# Patient Record
Sex: FEMALE | Race: WHITE | NOT HISPANIC OR LATINO | URBAN - METROPOLITAN AREA
[De-identification: names, ages, dates, MRNs, and addresses within clinical notes are randomized per-mention and may not be internally consistent; named-entity substitution may affect disease eponyms.]

---

## 2019-04-07 ENCOUNTER — INPATIENT (INPATIENT)
Facility: HOSPITAL | Age: 74
LOS: 2 days | Discharge: HOME | DRG: 149 | End: 2019-04-10
Attending: PSYCHIATRY & NEUROLOGY | Admitting: PSYCHIATRY & NEUROLOGY
Payer: MEDICARE

## 2019-04-07 VITALS
HEIGHT: 63 IN | RESPIRATION RATE: 18 BRPM | HEART RATE: 83 BPM | WEIGHT: 149.91 LBS | OXYGEN SATURATION: 96 % | DIASTOLIC BLOOD PRESSURE: 84 MMHG | SYSTOLIC BLOOD PRESSURE: 151 MMHG | TEMPERATURE: 98 F

## 2019-04-07 DIAGNOSIS — R42 DIZZINESS AND GIDDINESS: ICD-10-CM

## 2019-04-07 LAB
ALBUMIN SERPL ELPH-MCNC: 4.1 G/DL — SIGNIFICANT CHANGE UP (ref 3.3–5)
ALP SERPL-CCNC: 77 U/L — SIGNIFICANT CHANGE UP (ref 40–120)
ALT FLD-CCNC: 8 U/L — LOW (ref 10–45)
ANION GAP SERPL CALC-SCNC: 11 MMOL/L — SIGNIFICANT CHANGE UP (ref 5–17)
AST SERPL-CCNC: 12 U/L — SIGNIFICANT CHANGE UP (ref 10–40)
BASOPHILS # BLD AUTO: 0 K/UL — SIGNIFICANT CHANGE UP (ref 0–0.2)
BASOPHILS NFR BLD AUTO: 0.6 % — SIGNIFICANT CHANGE UP (ref 0–2)
BILIRUB SERPL-MCNC: 0.3 MG/DL — SIGNIFICANT CHANGE UP (ref 0.2–1.2)
BUN SERPL-MCNC: 18 MG/DL — SIGNIFICANT CHANGE UP (ref 7–23)
CALCIUM SERPL-MCNC: 9.5 MG/DL — SIGNIFICANT CHANGE UP (ref 8.4–10.5)
CHLORIDE SERPL-SCNC: 101 MMOL/L — SIGNIFICANT CHANGE UP (ref 96–108)
CO2 SERPL-SCNC: 24 MMOL/L — SIGNIFICANT CHANGE UP (ref 22–31)
CREAT SERPL-MCNC: 0.69 MG/DL — SIGNIFICANT CHANGE UP (ref 0.5–1.3)
EOSINOPHIL # BLD AUTO: 0.1 K/UL — SIGNIFICANT CHANGE UP (ref 0–0.5)
EOSINOPHIL NFR BLD AUTO: 1.2 % — SIGNIFICANT CHANGE UP (ref 0–6)
GLUCOSE SERPL-MCNC: 129 MG/DL — HIGH (ref 70–99)
HCT VFR BLD CALC: 42.1 % — SIGNIFICANT CHANGE UP (ref 34.5–45)
HGB BLD-MCNC: 13.9 G/DL — SIGNIFICANT CHANGE UP (ref 11.5–15.5)
LYMPHOCYTES # BLD AUTO: 2.3 K/UL — SIGNIFICANT CHANGE UP (ref 1–3.3)
LYMPHOCYTES # BLD AUTO: 30.6 % — SIGNIFICANT CHANGE UP (ref 13–44)
MCHC RBC-ENTMCNC: 29.5 PG — SIGNIFICANT CHANGE UP (ref 27–34)
MCHC RBC-ENTMCNC: 33.1 GM/DL — SIGNIFICANT CHANGE UP (ref 32–36)
MCV RBC AUTO: 89.1 FL — SIGNIFICANT CHANGE UP (ref 80–100)
MONOCYTES # BLD AUTO: 0.4 K/UL — SIGNIFICANT CHANGE UP (ref 0–0.9)
MONOCYTES NFR BLD AUTO: 5.7 % — SIGNIFICANT CHANGE UP (ref 2–14)
NEUTROPHILS # BLD AUTO: 4.6 K/UL — SIGNIFICANT CHANGE UP (ref 1.8–7.4)
NEUTROPHILS NFR BLD AUTO: 62 % — SIGNIFICANT CHANGE UP (ref 43–77)
PLATELET # BLD AUTO: 259 K/UL — SIGNIFICANT CHANGE UP (ref 150–400)
POTASSIUM SERPL-MCNC: 4.3 MMOL/L — SIGNIFICANT CHANGE UP (ref 3.5–5.3)
POTASSIUM SERPL-SCNC: 4.3 MMOL/L — SIGNIFICANT CHANGE UP (ref 3.5–5.3)
PROT SERPL-MCNC: 6.9 G/DL — SIGNIFICANT CHANGE UP (ref 6–8.3)
RBC # BLD: 4.72 M/UL — SIGNIFICANT CHANGE UP (ref 3.8–5.2)
RBC # FLD: 12.8 % — SIGNIFICANT CHANGE UP (ref 10.3–14.5)
SODIUM SERPL-SCNC: 136 MMOL/L — SIGNIFICANT CHANGE UP (ref 135–145)
WBC # BLD: 7.4 K/UL — SIGNIFICANT CHANGE UP (ref 3.8–10.5)
WBC # FLD AUTO: 7.4 K/UL — SIGNIFICANT CHANGE UP (ref 3.8–10.5)

## 2019-04-07 PROCEDURE — 70450 CT HEAD/BRAIN W/O DYE: CPT | Mod: 26

## 2019-04-07 PROCEDURE — 99285 EMERGENCY DEPT VISIT HI MDM: CPT | Mod: 25

## 2019-04-07 PROCEDURE — 93010 ELECTROCARDIOGRAM REPORT: CPT | Mod: 59

## 2019-04-07 RX ORDER — DIAZEPAM 5 MG
5 TABLET ORAL ONCE
Qty: 0 | Refills: 0 | Status: DISCONTINUED | OUTPATIENT
Start: 2019-04-07 | End: 2019-04-07

## 2019-04-07 RX ORDER — MECLIZINE HCL 12.5 MG
25 TABLET ORAL ONCE
Qty: 0 | Refills: 0 | Status: COMPLETED | OUTPATIENT
Start: 2019-04-07 | End: 2019-04-07

## 2019-04-07 RX ORDER — ENOXAPARIN SODIUM 100 MG/ML
40 INJECTION SUBCUTANEOUS EVERY 24 HOURS
Qty: 0 | Refills: 0 | Status: DISCONTINUED | OUTPATIENT
Start: 2019-04-07 | End: 2019-04-10

## 2019-04-07 RX ORDER — SODIUM CHLORIDE 9 MG/ML
1000 INJECTION INTRAMUSCULAR; INTRAVENOUS; SUBCUTANEOUS ONCE
Qty: 0 | Refills: 0 | Status: COMPLETED | OUTPATIENT
Start: 2019-04-07 | End: 2019-04-07

## 2019-04-07 RX ORDER — METOCLOPRAMIDE HCL 10 MG
10 TABLET ORAL ONCE
Qty: 0 | Refills: 0 | Status: COMPLETED | OUTPATIENT
Start: 2019-04-07 | End: 2019-04-07

## 2019-04-07 RX ADMIN — Medication 5 MILLIGRAM(S): at 15:09

## 2019-04-07 RX ADMIN — Medication 25 MILLIGRAM(S): at 10:49

## 2019-04-07 RX ADMIN — Medication 10 MILLIGRAM(S): at 10:49

## 2019-04-07 RX ADMIN — SODIUM CHLORIDE 2000 MILLILITER(S): 9 INJECTION INTRAMUSCULAR; INTRAVENOUS; SUBCUTANEOUS at 10:49

## 2019-04-07 NOTE — ED ADULT NURSE REASSESSMENT NOTE - NS ED NURSE REASSESS COMMENT FT1
Assisted pt in ambulating to restroom. Reports improvement in dizziness. Waittng for bed upstairs. Neuro MD asked for pt's home medications - she reports taking OTC allegra daily.

## 2019-04-07 NOTE — ED PROVIDER NOTE - OBJECTIVE STATEMENT
74 yoF, otherwise healthy, presents to ED with sudden onset of vertigo while standing this am around 04:00. Woke up to use bathroom. Felt room spinning sensation and nausea, worse with head movement. Vomited x2 en route with EMS, NBNB. No fall or head trauma. Felt well last night. No abdominal pain. Has had vertigo but years ago. No speech or memory changes. No focal motor weakness.

## 2019-04-07 NOTE — ED PROVIDER NOTE - PHYSICAL EXAMINATION
PHYSICAL EXAM:  GENERAL: mild distress, well-groomed, well-developed  HEAD:  Atraumatic, Normocephalic  EYES: EOMI, PERRLA, conjunctiva and sclera clear  ENMT: No tonsillar erythema, exudates, or enlargement; Moist mucous membranes  NECK: Supple, No JVD  HEART: Regular rate and rhythm; No murmurs, rubs, or gallops  RESPIRATORY: CTA B/L, No W/R/R  ABDOMEN: Soft, Nontender, Nondistended; Bowel sounds present  NEURO: A&Ox3, nonfocal, moving all extremities, 5/5 stregth, PERRL, cranial nerves 2-12 intact, mild R horizontal nystagmus with R gaze when supine, normal speech/memory, no facial droop  EXTREMITIES:  2+ Peripheral Pulses, No clubbing, cyanosis, or edema  SKIN: warm, dry, normal color

## 2019-04-07 NOTE — ED PROVIDER NOTE - PROGRESS NOTE DETAILS
Endorsed to Dr Kiran Whyte MD, Facep Haverty PGY1- sx better after vailum, even with movement of head, but unable to ambulate without feeling vertiginous, neuro paged, they will see, consider admission if unsafe to go home Ruddy Resident MD: Pt signed out to me. Neuro accepting patient for admission under Dr. Randle, MR head/neck ordered.

## 2019-04-07 NOTE — ED ADULT NURSE REASSESSMENT NOTE - NS ED NURSE REASSESS COMMENT FT1
Pt denies pain. Pt states she feels good just laying down. Pt given food tray per MD order. Will continue to monitor.

## 2019-04-07 NOTE — ED ADULT NURSE NOTE - NSIMPLEMENTINTERV_GEN_ALL_ED
Implemented All Universal Safety Interventions:  [place] to call system. Call bell, personal items and telephone within reach. Instruct patient to call for assistance. Room bathroom lighting operational. Non-slip footwear when patient is off stretcher. Physically safe environment: no spills, clutter or unnecessary equipment. Stretcher in lowest position, wheels locked, appropriate side rails in place.

## 2019-04-07 NOTE — ED ADULT NURSE NOTE - OBJECTIVE STATEMENT
74YOF denies pmh presents to ED via EMS c/o dizziness, nausea, vomiting. Pt states she went to the bathroom at 4am and started feeling dizzy "room spinning," nausea, and vomited x1. Denies blood in vomit. Pt states she feels like "room is spinning." Denies CP, SOB, fever, chills, HA, back pain, burning upon urination.  Safety and comfort measures provided. Will continue to monitor.

## 2019-04-07 NOTE — ED ADULT NURSE REASSESSMENT NOTE - NS ED NURSE REASSESS COMMENT FT1
Pt states she is only dizzy when moving around. Pt to be evaluated by neuro and to be admitted. Pt aware of the plan. Will continue to monitor.

## 2019-04-07 NOTE — ED PROVIDER NOTE - ATTENDING CONTRIBUTION TO CARE
Private Physician None  74y female pmh Tobacco 1ppd, No dm,htn.hld,cancer,cva,mi/cad.travel. Pt comes to ed complains of dizziness. Onset 4am while in bed. Symptoms feel like I'm falling into a hole. Worse with turning head. Associated with nausea and vomiting, Nbnb stomach contents. No ha,earache no recent viral infections. PE WDWN female awake alert normocephalic atraumatic neck supple chest clear anterior & posterior abd soft +bs no mass guarding cv no rubs, gallops or murmurs, neuro gcs15 speech fleunt power 5/5 all extr pain light touch intact, cn2-12 intact except slight rt horizontal nystagmus.   Jose Whyte MD, Facep

## 2019-04-07 NOTE — H&P ADULT - ASSESSMENT
73yo C woman with no significant PMHx presents to the ED with complaints of vertigo.    Impression:  Central vs. Peripheral Vertigo    Plan:  - MRI Brain  - MRA H w/o, N w/.  - Symptomatic relief w/ Meclizine, Reglan, and/or Diazepam.   - IVF

## 2019-04-07 NOTE — ED PROVIDER NOTE - CLINICAL SUMMARY MEDICAL DECISION MAKING FREE TEXT BOX
Eldelry female pw complains of vertigo check ct ro central cause. treat symptomatically and reassess. If not improved consult neuro  Jose Whyte MD, Facep

## 2019-04-07 NOTE — ED ADULT NURSE REASSESSMENT NOTE - NS ED NURSE REASSESS COMMENT FT1
Report received from Gracia EDGE . Pt AAOx4, NAD, resp nonlabored, skin warm/dry, resting comfortably in bed. Neuro resident at bedside evaluating patient. Pt c/o dizziness on standing . Pt denies headache, chest pain, palpitations, SOB, abd pain, n/v/d, urinary symptoms, fevers, chills, weakness at this time. Currently admitted awaiting inpatient bed placement. Patient aware. Safety and comfort maintained.

## 2019-04-07 NOTE — H&P ADULT - NSHPPHYSICALEXAM_GEN_ALL_CORE
PHYSICAL EXAMINATION:  General: Well-developed, well nourished, in no acute distress.  Eyes: Conjunctiva and sclera clear.  Neurologic:  - Mental Status:  Alert, awake, oriented to person, place, and time; Speech is fluent with intact naming, repetition, and comprehension; Good overall fund of knowledge  - Cranial Nerves II-XII:    II:  Visual fields are full to confrontation; Pupils are equal, round, and reactive to light;    + III, IV, VI: ?Mild Right MIGUELITO? Diplopia on extreme left lateral gaze     V:  Facial sensation is intact in the V1-V3 distribution bilaterally.  VII:  Face is symmetric with normal eye closure and smile  VIII:  Hearing is intact to finger rub.  IX, X:  Uvula is midline and soft palate rises symmetrically  XI:  Head turning and shoulder shrug are intact.  XII:  Tongue protudes in the midline.  - Motor:  Strength is 5/5 throughout.  There is no pronator drift.  Normal muscle bulk and tone throughout.  - Reflexes:  2+ and symmetric at the biceps, triceps, brachioradialis, knees, and ankles.  Plantar responses flexor.  - Sensory:  Intact throughout to vibration, and joint-position, light touch, pin prick.    + Coordination: Mild bilateral dysmetria on FNF, L>R.    - Gait:  deferred

## 2019-04-07 NOTE — H&P ADULT - HISTORY OF PRESENT ILLNESS
75yo C woman with no significant PMHx presents to the ED with complaints of vertigo. Patient reports that she woke at 4am this morning when she noticed acute room spinning sensations. She states that it became acutely worse when she stood to go to the bathroom. Patient was nauseous. Vertigo persisted through the day. EMS was activated. Patient vomited twice while enroute to ED. Patient currently denies headache, blurry vision, diplopia, cp, sob, abd pain, d/c, weakness, or changes to weight or senses.   In the ED, patient received Diazepam, Reglan, Meclizine, with mild improvement of symptoms. VSS, CTH negative, labs grossly WNL.

## 2019-04-07 NOTE — H&P ADULT - NSHPLABSRESULTS_GEN_ALL_CORE
Vitals:  T(C): 36.7 (04-07-19 @ 19:43), Max: 36.9 (04-07-19 @ 18:55)  HR: 79 (04-07-19 @ 19:43) (74 - 86)  BP: 98/55 (04-07-19 @ 19:43) (98/55 - 151/84)  RR: 17 (04-07-19 @ 19:43) (17 - 18)  SpO2: 96% (04-07-19 @ 19:43) (96% - 100%)    Labs:                        13.9   7.4   )-----------( 259      ( 07 Apr 2019 10:45 )             42.1     04-07    136  |  101  |  18  ----------------------------<  129<H>  4.3   |  24  |  0.69    Ca    9.5      07 Apr 2019 10:45    TPro  6.9  /  Alb  4.1  /  TBili  0.3  /  DBili  x   /  AST  12  /  ALT  8<L>  /  AlkPhos  77  04-07    CAPILLARY BLOOD GLUCOSE      LIVER FUNCTIONS - ( 07 Apr 2019 10:45 )  Alb: 4.1 g/dL / Pro: 6.9 g/dL / ALK PHOS: 77 U/L / ALT: 8 U/L / AST: 12 U/L / GGT: x            Radiology:  < from: CT Head No Cont (04.07.19 @ 12:15) >    IMPRESSION:     Mild volume loss with microvascular disease, there is no acute hemorrhage   or midline shift. Poorly pneumatized and opacified mastoid tips. If   symptoms persist consider follow-up up head CT or MR if no   contraindications.    NATHANIEL SAVAGE M.D., RADIOLOGY RESIDENT  This document has been electronically signed.  IQRA HALLMAN M.D., ATTENDING RADIOLOGIST  This document has been electronically signed. Apr 7 2019 12:50PM    < end of copied text >

## 2019-04-08 LAB
ALBUMIN SERPL ELPH-MCNC: 3.7 G/DL — SIGNIFICANT CHANGE UP (ref 3.3–5)
ALP SERPL-CCNC: 71 U/L — SIGNIFICANT CHANGE UP (ref 40–120)
ALT FLD-CCNC: 10 U/L — SIGNIFICANT CHANGE UP (ref 10–45)
ANION GAP SERPL CALC-SCNC: 13 MMOL/L — SIGNIFICANT CHANGE UP (ref 5–17)
APPEARANCE UR: CLEAR — SIGNIFICANT CHANGE UP
AST SERPL-CCNC: 11 U/L — SIGNIFICANT CHANGE UP (ref 10–40)
BASOPHILS # BLD AUTO: 0.02 K/UL — SIGNIFICANT CHANGE UP (ref 0–0.2)
BASOPHILS NFR BLD AUTO: 0.3 % — SIGNIFICANT CHANGE UP (ref 0–2)
BILIRUB SERPL-MCNC: 0.4 MG/DL — SIGNIFICANT CHANGE UP (ref 0.2–1.2)
BILIRUB UR-MCNC: NEGATIVE — SIGNIFICANT CHANGE UP
BUN SERPL-MCNC: 16 MG/DL — SIGNIFICANT CHANGE UP (ref 7–23)
CALCIUM SERPL-MCNC: 9.1 MG/DL — SIGNIFICANT CHANGE UP (ref 8.4–10.5)
CHLORIDE SERPL-SCNC: 103 MMOL/L — SIGNIFICANT CHANGE UP (ref 96–108)
CHOLEST SERPL-MCNC: 173 MG/DL — SIGNIFICANT CHANGE UP (ref 10–199)
CO2 SERPL-SCNC: 23 MMOL/L — SIGNIFICANT CHANGE UP (ref 22–31)
COLOR SPEC: SIGNIFICANT CHANGE UP
CREAT SERPL-MCNC: 0.66 MG/DL — SIGNIFICANT CHANGE UP (ref 0.5–1.3)
DIFF PNL FLD: NEGATIVE — SIGNIFICANT CHANGE UP
EOSINOPHIL # BLD AUTO: 0.06 K/UL — SIGNIFICANT CHANGE UP (ref 0–0.5)
EOSINOPHIL NFR BLD AUTO: 0.8 % — SIGNIFICANT CHANGE UP (ref 0–6)
ESTIMATED AVERAGE GLUCOSE: 126 MG/DL — HIGH (ref 68–114)
FOLATE SERPL-MCNC: 10.9 NG/ML — SIGNIFICANT CHANGE UP
GLUCOSE SERPL-MCNC: 94 MG/DL — SIGNIFICANT CHANGE UP (ref 70–99)
GLUCOSE UR QL: NEGATIVE — SIGNIFICANT CHANGE UP
HBA1C BLD-MCNC: 6 % — HIGH (ref 4–5.6)
HBA1C BLD-MCNC: 6 % — HIGH (ref 4–5.6)
HCT VFR BLD CALC: 39.9 % — SIGNIFICANT CHANGE UP (ref 34.5–45)
HCV AB S/CO SERPL IA: 0.17 S/CO — SIGNIFICANT CHANGE UP (ref 0–0.99)
HCV AB SERPL-IMP: SIGNIFICANT CHANGE UP
HDLC SERPL-MCNC: 41 MG/DL — LOW
HGB BLD-MCNC: 12.8 G/DL — SIGNIFICANT CHANGE UP (ref 11.5–15.5)
IMM GRANULOCYTES NFR BLD AUTO: 0.3 % — SIGNIFICANT CHANGE UP (ref 0–1.5)
KETONES UR-MCNC: NEGATIVE — SIGNIFICANT CHANGE UP
LEUKOCYTE ESTERASE UR-ACNC: NEGATIVE — SIGNIFICANT CHANGE UP
LIPID PNL WITH DIRECT LDL SERPL: 101 MG/DL — HIGH
LYMPHOCYTES # BLD AUTO: 2.52 K/UL — SIGNIFICANT CHANGE UP (ref 1–3.3)
LYMPHOCYTES # BLD AUTO: 32.6 % — SIGNIFICANT CHANGE UP (ref 13–44)
MCHC RBC-ENTMCNC: 28.4 PG — SIGNIFICANT CHANGE UP (ref 27–34)
MCHC RBC-ENTMCNC: 32.1 GM/DL — SIGNIFICANT CHANGE UP (ref 32–36)
MCV RBC AUTO: 88.5 FL — SIGNIFICANT CHANGE UP (ref 80–100)
MONOCYTES # BLD AUTO: 0.72 K/UL — SIGNIFICANT CHANGE UP (ref 0–0.9)
MONOCYTES NFR BLD AUTO: 9.3 % — SIGNIFICANT CHANGE UP (ref 2–14)
NEUTROPHILS # BLD AUTO: 4.39 K/UL — SIGNIFICANT CHANGE UP (ref 1.8–7.4)
NEUTROPHILS NFR BLD AUTO: 56.7 % — SIGNIFICANT CHANGE UP (ref 43–77)
NITRITE UR-MCNC: NEGATIVE — SIGNIFICANT CHANGE UP
PH UR: 6.5 — SIGNIFICANT CHANGE UP (ref 5–8)
PLATELET # BLD AUTO: 253 K/UL — SIGNIFICANT CHANGE UP (ref 150–400)
POTASSIUM SERPL-MCNC: 4.1 MMOL/L — SIGNIFICANT CHANGE UP (ref 3.5–5.3)
POTASSIUM SERPL-SCNC: 4.1 MMOL/L — SIGNIFICANT CHANGE UP (ref 3.5–5.3)
PROT SERPL-MCNC: 6.5 G/DL — SIGNIFICANT CHANGE UP (ref 6–8.3)
PROT UR-MCNC: NEGATIVE — SIGNIFICANT CHANGE UP
RBC # BLD: 4.51 M/UL — SIGNIFICANT CHANGE UP (ref 3.8–5.2)
RBC # FLD: 13.5 % — SIGNIFICANT CHANGE UP (ref 10.3–14.5)
SODIUM SERPL-SCNC: 139 MMOL/L — SIGNIFICANT CHANGE UP (ref 135–145)
SP GR SPEC: 1.02 — SIGNIFICANT CHANGE UP (ref 1.01–1.02)
T3 SERPL-MCNC: 93 NG/DL — SIGNIFICANT CHANGE UP (ref 80–200)
T4 AB SER-ACNC: 5.7 UG/DL — SIGNIFICANT CHANGE UP (ref 4.6–12)
TOTAL CHOLESTEROL/HDL RATIO MEASUREMENT: 4.2 RATIO — SIGNIFICANT CHANGE UP (ref 3.3–7.1)
TRIGL SERPL-MCNC: 156 MG/DL — HIGH (ref 10–149)
TSH SERPL-MCNC: 2.45 UIU/ML — SIGNIFICANT CHANGE UP (ref 0.27–4.2)
UROBILINOGEN FLD QL: SIGNIFICANT CHANGE UP
VIT B12 SERPL-MCNC: 372 PG/ML — SIGNIFICANT CHANGE UP (ref 232–1245)
WBC # BLD: 7.73 K/UL — SIGNIFICANT CHANGE UP (ref 3.8–10.5)
WBC # FLD AUTO: 7.73 K/UL — SIGNIFICANT CHANGE UP (ref 3.8–10.5)

## 2019-04-08 PROCEDURE — 70544 MR ANGIOGRAPHY HEAD W/O DYE: CPT | Mod: 26,59

## 2019-04-08 PROCEDURE — 70548 MR ANGIOGRAPHY NECK W/DYE: CPT | Mod: 26

## 2019-04-08 PROCEDURE — 70553 MRI BRAIN STEM W/O & W/DYE: CPT | Mod: 26

## 2019-04-08 RX ORDER — LORATADINE 10 MG/1
10 TABLET ORAL DAILY
Qty: 0 | Refills: 0 | Status: DISCONTINUED | OUTPATIENT
Start: 2019-04-08 | End: 2019-04-10

## 2019-04-08 NOTE — ED ADULT NURSE REASSESSMENT NOTE - NS ED NURSE REASSESS COMMENT FT1
pt resting comfortably in stretcher, no complaints of pain or discomfort upon assessing pt, , awaiting breakfast meal tray. upon assisting pt to the restroom pt still c/o feeling very dizzy and like the room is spinning upon standing and with ambulation, pts gait unsteady and requiring assistance with ambulation, wheelchair now being used for bringing pt to the bathroom and then assisting pt into the bathroom with one assist. call bell remains at the bedside and within reach and pt informed on call bell use for assistance when needing to use the bathroom, understanding verbalized, red socks on pt. VS stable. neuro intact with no deficits. safety and fall precautions maintained. pending medicine neuro bed assignment.

## 2019-04-09 RX ORDER — LORATADINE 10 MG/1
10 TABLET ORAL ONCE
Qty: 0 | Refills: 0 | Status: COMPLETED | OUTPATIENT
Start: 2019-04-09 | End: 2019-04-09

## 2019-04-09 RX ORDER — ATORVASTATIN CALCIUM 80 MG/1
10 TABLET, FILM COATED ORAL AT BEDTIME
Qty: 0 | Refills: 0 | Status: DISCONTINUED | OUTPATIENT
Start: 2019-04-09 | End: 2019-04-09

## 2019-04-09 RX ADMIN — LORATADINE 10 MILLIGRAM(S): 10 TABLET ORAL at 11:37

## 2019-04-09 RX ADMIN — LORATADINE 10 MILLIGRAM(S): 10 TABLET ORAL at 02:05

## 2019-04-09 NOTE — PROGRESS NOTE ADULT - SUBJECTIVE AND OBJECTIVE BOX
Neurology Follow up note    Patient is a 74y old  Female who presents with a chief complaint of Vertigo (07 Apr 2019 21:39)      Subjective: Interval History - feels much better. still with room spinning. walking with walker, reports at baseline she walks w/o walker.    Objective:   Vital Signs Last 24 Hrs  T(C): 36.8 (09 Apr 2019 08:44), Max: 36.9 (08 Apr 2019 21:16)  T(F): 98.2 (09 Apr 2019 08:44), Max: 98.4 (08 Apr 2019 21:16)  HR: 70 (09 Apr 2019 08:44) (69 - 79)  BP: 131/75 (09 Apr 2019 08:44) (108/67 - 132/75)  BP(mean): --  RR: 18 (09 Apr 2019 08:44) (16 - 18)  SpO2: 97% (09 Apr 2019 08:44) (95% - 99%)    Neurological Exam:  Mental Status: Attention intact.  No dysarthria, aphasia or neglect.       Cranial Nerves: EOMI, VFF, no nystagmus. reports dizziness is worst on right lateral gaze. CN V1-V3 intact to light touch.  No facial asymmetry. decreased hearing on the right. Tongue midline.      Strength: 5/5 biceps, triceps b/l. 5/5 KF, KE b/l.    Gait: walking with walker without any instability    Other:    04-08    139  |  103  |  16  ----------------------------<  94  4.1   |  23  |  0.66    Ca    9.1      08 Apr 2019 05:34    TPro  6.5  /  Alb  3.7  /  TBili  0.4  /  DBili  x   /  AST  11  /  ALT  10  /  AlkPhos  71  04-08 04-08    139  |  103  |  16  ----------------------------<  94  4.1   |  23  |  0.66    Ca    9.1      08 Apr 2019 05:34    TPro  6.5  /  Alb  3.7  /  TBili  0.4  /  DBili  x   /  AST  11  /  ALT  10  /  AlkPhos  71  04-08    LIVER FUNCTIONS - ( 08 Apr 2019 05:34 )  Alb: 3.7 g/dL / Pro: 6.5 g/dL / ALK PHOS: 71 U/L / ALT: 10 U/L / AST: 11 U/L / GGT: x                                 12.8   7.73  )-----------( 253      ( 08 Apr 2019 08:56 )             39.9     Radiology    EKG:  tele:  TTE:  EEG:      MEDICATIONS  (STANDING):  enoxaparin Injectable 40 milliGRAM(s) SubCutaneous every 24 hours  loratadine 10 milliGRAM(s) Oral daily    MEDICATIONS  (PRN):

## 2019-04-09 NOTE — PROGRESS NOTE ADULT - ASSESSMENT
73yo C woman with no significant PMHx presents to the ED with complaints of vertigo.    Impression:  Central vs. Peripheral Vertigo    Plan:  - MRI Brain  - MRA H w/o, N w/.  - Symptomatic relief w/ Meclizine, Reglan, and/or Diazepam.   - IVF 73yo C woman with no significant PMHx presents to the ED with complaints of vertigo.    Impression: MRI brain negative for acute ischemia. MRA had some multifocal stenoses, but no major large vessel atherosclerosis. Etiology of symptoms is likely peripheral.    Plan:  - Symptomatic relief w/ Meclizine, Reglan, and/or Diazepam.   - PT/OT eval

## 2019-04-10 ENCOUNTER — TRANSCRIPTION ENCOUNTER (OUTPATIENT)
Age: 74
End: 2019-04-10

## 2019-04-10 VITALS — SYSTOLIC BLOOD PRESSURE: 111 MMHG | DIASTOLIC BLOOD PRESSURE: 72 MMHG | OXYGEN SATURATION: 97 % | HEART RATE: 77 BPM

## 2019-04-10 PROCEDURE — 96374 THER/PROPH/DIAG INJ IV PUSH: CPT | Mod: XU

## 2019-04-10 PROCEDURE — 84436 ASSAY OF TOTAL THYROXINE: CPT

## 2019-04-10 PROCEDURE — 82746 ASSAY OF FOLIC ACID SERUM: CPT

## 2019-04-10 PROCEDURE — 70553 MRI BRAIN STEM W/O & W/DYE: CPT

## 2019-04-10 PROCEDURE — 84480 ASSAY TRIIODOTHYRONINE (T3): CPT

## 2019-04-10 PROCEDURE — 99285 EMERGENCY DEPT VISIT HI MDM: CPT | Mod: 25

## 2019-04-10 PROCEDURE — 80061 LIPID PANEL: CPT

## 2019-04-10 PROCEDURE — 70450 CT HEAD/BRAIN W/O DYE: CPT

## 2019-04-10 PROCEDURE — 84443 ASSAY THYROID STIM HORMONE: CPT

## 2019-04-10 PROCEDURE — 70548 MR ANGIOGRAPHY NECK W/DYE: CPT

## 2019-04-10 PROCEDURE — 82607 VITAMIN B-12: CPT

## 2019-04-10 PROCEDURE — 70544 MR ANGIOGRAPHY HEAD W/O DYE: CPT

## 2019-04-10 PROCEDURE — 86803 HEPATITIS C AB TEST: CPT

## 2019-04-10 PROCEDURE — 81003 URINALYSIS AUTO W/O SCOPE: CPT

## 2019-04-10 PROCEDURE — 97165 OT EVAL LOW COMPLEX 30 MIN: CPT

## 2019-04-10 PROCEDURE — A9585: CPT

## 2019-04-10 PROCEDURE — 83036 HEMOGLOBIN GLYCOSYLATED A1C: CPT

## 2019-04-10 PROCEDURE — 85027 COMPLETE CBC AUTOMATED: CPT

## 2019-04-10 PROCEDURE — 80053 COMPREHEN METABOLIC PANEL: CPT

## 2019-04-10 PROCEDURE — 93005 ELECTROCARDIOGRAM TRACING: CPT

## 2019-04-10 RX ORDER — LORATADINE 10 MG/1
1 TABLET ORAL
Qty: 0 | Refills: 0 | DISCHARGE
Start: 2019-04-10

## 2019-04-10 NOTE — DISCHARGE NOTE PROVIDER - NSFOLLOWUPCLINICS_GEN_ALL_ED_FT
[facility] - ENT  Otolaryngology (ENT)  [address] [place] [address]  Phone: [phone]  Fax:   Follow Up Time:

## 2019-04-10 NOTE — OCCUPATIONAL THERAPY INITIAL EVALUATION ADULT - PERTINENT HX OF CURRENT PROBLEM, REHAB EVAL
75yo F presents to the ED with complaints of vertigo. Patient reports that she woke at 4am this morning when she noticed acute room spinning sensations. She states that it became acutely worse when she stood to go to the bathroom. Patient was nauseous. Vertigo persisted through the day. EMS was activated. Patient vomited twice while enroute to ED. In the ED, patient received Diazepam, Reglan, Meclizine, with mild improvement of symptoms. VSS, CTH negative, labs grossly WNL.

## 2019-04-10 NOTE — PHYSICAL THERAPY INITIAL EVALUATION ADULT - PERTINENT HX OF CURRENT PROBLEM, REHAB EVAL
73yo F presents to the ED with c/o vertigo. Pt reports that she woke at 4am this morning when she noticed acute room spinning sensations. She states that it became acutely worse when she stood to go to the bathroom. Pt was nauseous. Vertigo persisted through the day. EMS was activated. Pt vomited twice while enroute to ED.

## 2019-04-10 NOTE — DISCHARGE NOTE PROVIDER - NSDCCPCAREPLAN_GEN_ALL_CORE_FT
PRINCIPAL DISCHARGE DIAGNOSIS  Diagnosis: Vertigo  Assessment and Plan of Treatment: Please attend outpatient physical therapy and vestibular rehab. You can follow up in the neurology or ENT office after discharge from the hospital.

## 2019-04-10 NOTE — DISCHARGE NOTE NURSING/CASE MANAGEMENT/SOCIAL WORK - NSDCPEEMAIL_GEN_ALL_CORE
[facility] for Tobacco Control email [email or web address]

## 2019-04-10 NOTE — PROGRESS NOTE ADULT - ASSESSMENT
73yo C woman with no significant PMHx presents to the ED with complaints of vertigo.    Impression: MRI brain negative for acute ischemia. MRA had some multifocal stenoses, but no major large vessel atherosclerosis. Etiology of symptoms is likely peripheral.    Plan:  - Symptomatic relief w/ Meclizine, Reglan, and/or Diazepam.   - PT/OT eval  -d/c planning

## 2019-04-10 NOTE — DISCHARGE NOTE NURSING/CASE MANAGEMENT/SOCIAL WORK - NSDCDPATPORTLINK_GEN_ALL_CORE
You can access the [facility] Portal, offered by [facility], by registering with the following website: [email or web address]

## 2019-04-10 NOTE — OCCUPATIONAL THERAPY INITIAL EVALUATION ADULT - ADDITIONAL COMMENTS
MR head 4/8- Redemonstration of mild to moderate volume loss with nonspecific white matter T2 hyperintensities likely microvascular disease without restricted diffusion, hemorrhage or midline shift. Atherosclerotic vascular calcification with multifocal stenosis and irregularity of the carotid siphons with patent proximal vessels, stenosis of the distal anterior middle and posterior cerebral arteries. Slightly tortuous extracranial vessels likely reflecting hypertension, there is no hemodynamically significant stenosis at the ICA origins by NASCET criteria.

## 2019-04-10 NOTE — PHYSICAL THERAPY INITIAL EVALUATION ADULT - ADDITIONAL COMMENTS
Prior to admission pt lived alone in apartment with elevator access and was independent with all functional mobility.

## 2019-04-10 NOTE — PROGRESS NOTE ADULT - SUBJECTIVE AND OBJECTIVE BOX
Neurology Follow up note    Patient is a 74y old  Female who presents with a chief complaint of Vertigo (09 Apr 2019 11:13)      Subjective: Interval History - No events overnight    Objective:   Vital Signs Last 24 Hrs  T(C): 37.1 (10 Apr 2019 08:20), Max: 37.2 (09 Apr 2019 15:20)  T(F): 98.8 (10 Apr 2019 08:20), Max: 98.9 (09 Apr 2019 15:20)  HR: 66 (10 Apr 2019 08:20) (66 - 93)  BP: 135/78 (10 Apr 2019 08:20) (106/73 - 135/78)  BP(mean): --  RR: 18 (10 Apr 2019 08:20) (18 - 18)  SpO2: 97% (10 Apr 2019 08:20) (95% - 98%)      Neurological Exam:  Mental Status: Attention intact.  No dysarthria, aphasia or neglect.       Cranial Nerves: EOMI, VFF, no nystagmus. reports dizziness is worst on right lateral gaze. CN V1-V3 intact to light touch.  No facial asymmetry. decreased hearing on the right. Tongue midline.      Strength: 5/5 biceps, triceps b/l. 5/5 KF, KE b/l.    Gait: walking with walker without any instability    Other:                    Radiology    EKG:  tele:  TTE:  EEG:      MEDICATIONS  (STANDING):  enoxaparin Injectable 40 milliGRAM(s) SubCutaneous every 24 hours  loratadine 10 milliGRAM(s) Oral daily    MEDICATIONS  (PRN): Neurology Follow up note    Patient is a 74y old  Female who presents with a chief complaint of Vertigo (09 Apr 2019 11:13)      Subjective: Interval History - No events overnight    Objective:   Vital Signs Last 24 Hrs  T(C): 37.1 (10 Apr 2019 08:20), Max: 37.2 (09 Apr 2019 15:20)  T(F): 98.8 (10 Apr 2019 08:20), Max: 98.9 (09 Apr 2019 15:20)  HR: 66 (10 Apr 2019 08:20) (66 - 93)  BP: 135/78 (10 Apr 2019 08:20) (106/73 - 135/78)  BP(mean): --  RR: 18 (10 Apr 2019 08:20) (18 - 18)  SpO2: 97% (10 Apr 2019 08:20) (95% - 98%)      Neurological Exam:  Mental Status: Attention intact.  No dysarthria, aphasia or neglect.       Cranial Nerves: EOMI, no nystagmus.  No facial asymmetry.  Tongue midline.      Strength: 5/5 biceps, triceps b/l. 5/5 KF, KE b/l.    Gait: walking without walker without any instability    Other:                    Radiology    EKG:  tele:  TTE:  EEG:      MEDICATIONS  (STANDING):  enoxaparin Injectable 40 milliGRAM(s) SubCutaneous every 24 hours  loratadine 10 milliGRAM(s) Oral daily    MEDICATIONS  (PRN):

## 2019-04-10 NOTE — DISCHARGE NOTE NURSING/CASE MANAGEMENT/SOCIAL WORK - NSDCPEWEB_GEN_ALL_CORE
[facility] for Tobacco Control website --- [email or web address] --- [email or web address]

## 2019-04-10 NOTE — PHYSICAL THERAPY INITIAL EVALUATION ADULT - PRECAUTIONS/LIMITATIONS, REHAB EVAL
CTHead: Mild volume loss with microvascular disease, there is no acute hemorrhage or midline shift. Poorly pneumatized and opacified mastoid tips. If symptoms persist consider follow-up up head CT or MR if no contraindications.MRIHead/Neck: Redemonstration of mild to moderate volume loss with nonspecific white matter T2 hyperintensities likely microvascular disease without restricted diffusion, hemorrhage or midline shift.Atherosclerotic vascular calcification with multifocal stenosis and irregularity of the carotid siphons with patent proximal vessels,stenosis of the distal anterior middle and posterior cerebral arteries.Slightly tortuous extracranial vessels likely reflecting hypertension, there is no hemodynamically significant stenosis at the ICA origins by NASCET criteria. CTHead: Mild volume loss with microvascular disease, there is no acute hemorrhage or midline shift. Poorly pneumatized and opacified mastoid tips. If symptoms persist consider follow-up up head CT or MR if no contraindications.MRIHead/Neck: Redemonstration of mild to moderate volume loss with nonspecific white matter T2 hyperintensities likely microvascular disease without restricted diffusion, hemorrhage or midline shift.Atherosclerotic vascular calcification with multifocal stenosis and irregularity of the carotid siphons with patent proximal vessels,stenosis of the distal anterior middle and posterior cerebral arteries.Slightly tortuous extracranial vessels likely reflecting hypertension, there is no hemodynamically significant stenosis at the ICA origins by NASCET criteria./no known precautions/limitations

## 2019-04-10 NOTE — DISCHARGE NOTE PROVIDER - CARE PROVIDER_API CALL
Jordon Paz)  Otolaryngology  [address] [place] [address]  Phone: [phone]  Fax: [phone]  Follow Up Time:

## 2019-04-10 NOTE — DISCHARGE NOTE PROVIDER - HOSPITAL COURSE
75yo C woman with no significant PMHx presents to the ED with complaints of vertigo. Patient vomited twice while en route to ED. In the hospital, she was using a walker to ambulate, typically ambulates without any device assistance. MRI head was done which was negative for acute infarct. MRA head and neck showed some atherosclerosis of carotid siphons and stenoses of distal ERINN and PCA. She was evaluted by PT and recommened outpatient PT and vestibular rehab. Patinet was stabilized and discharge home. 73yo  woman with no significant PMHx presents to the ED with complaints of vertigo. Patient vomited twice while en route to ED. In the hospital, she was using a walker to ambulate, typically ambulates without any device assistance. MRI head was done which was negative for acute infarct. MRA head and neck showed some atherosclerosis of carotid siphons and stenoses of distal ERINN and PCA. She was evaluted by PT and recommened outpatient PT and vestibular rehab. Patient was stabilized and discharged home with outpatient PT and vestibular rehab.

## 2019-05-07 ENCOUNTER — APPOINTMENT (OUTPATIENT)
Dept: OTOLARYNGOLOGY | Facility: CLINIC | Age: 74
End: 2019-05-07
Payer: COMMERCIAL

## 2019-05-07 VITALS
DIASTOLIC BLOOD PRESSURE: 73 MMHG | HEART RATE: 83 BPM | WEIGHT: 150 LBS | HEIGHT: 63 IN | BODY MASS INDEX: 26.58 KG/M2 | SYSTOLIC BLOOD PRESSURE: 118 MMHG

## 2019-05-07 DIAGNOSIS — R42 DIZZINESS AND GIDDINESS: ICD-10-CM

## 2019-05-07 PROCEDURE — 99204 OFFICE O/P NEW MOD 45 MIN: CPT | Mod: 25

## 2019-05-07 PROCEDURE — 92567 TYMPANOMETRY: CPT

## 2019-05-07 PROCEDURE — 92557 COMPREHENSIVE HEARING TEST: CPT

## 2019-05-09 PROBLEM — R42 VERTIGO: Status: ACTIVE | Noted: 2019-05-09

## 2019-05-09 NOTE — REVIEW OF SYSTEMS
[Hearing Loss] : hearing loss [Dizziness] : dizziness [Vertigo] : vertigo [Itching] : itching [Negative] : Heme/Lymph [de-identified] : hives, rash

## 2019-05-09 NOTE — HISTORY OF PRESENT ILLNESS
[de-identified] : 73yo woman with episodic vertigo\par vertigo in the middle of the night\par has had it before\par once in several months\par called ambulance, juwan to hospital\par everything was spinning for hours, throwing up\par gave meds in ER; couple hrs\par had an MRI and CT and echo and all was normal\par has hearing loss as baseline, both ears\par no h/o migraines\par can get some motion sickness\par this event was a couple weeks ago\par since then no other episodes\par some balance issues\par she is getting therapy\par \par

## 2019-06-24 ENCOUNTER — APPOINTMENT (OUTPATIENT)
Dept: PHARMACY | Facility: CLINIC | Age: 74
End: 2019-06-24
Payer: SELF-PAY

## 2019-06-24 PROCEDURE — V5010 ASSESSMENT FOR HEARING AID: CPT | Mod: NC

## 2019-09-11 ENCOUNTER — APPOINTMENT (OUTPATIENT)
Dept: PHARMACY | Facility: CLINIC | Age: 74
End: 2019-09-11

## 2019-10-03 ENCOUNTER — APPOINTMENT (OUTPATIENT)
Dept: PHARMACY | Facility: CLINIC | Age: 74
End: 2019-10-03

## 2019-10-22 ENCOUNTER — APPOINTMENT (OUTPATIENT)
Dept: PHARMACY | Facility: CLINIC | Age: 74
End: 2019-10-22
Payer: SELF-PAY

## 2019-10-22 PROCEDURE — V5299A: CUSTOM | Mod: NC

## 2019-11-13 ENCOUNTER — APPOINTMENT (OUTPATIENT)
Dept: PHARMACY | Facility: CLINIC | Age: 74
End: 2019-11-13

## 2019-11-20 ENCOUNTER — APPOINTMENT (OUTPATIENT)
Dept: PHARMACY | Facility: CLINIC | Age: 74
End: 2019-11-20

## 2019-11-21 ENCOUNTER — APPOINTMENT (OUTPATIENT)
Dept: PHARMACY | Facility: CLINIC | Age: 74
End: 2019-11-21
Payer: SELF-PAY

## 2019-11-21 PROCEDURE — V5299A: CUSTOM | Mod: NC

## 2020-02-13 NOTE — DISCHARGE NOTE NURSING/CASE MANAGEMENT/SOCIAL WORK - NSDCPEPTSTRK_GEN_ALL_CORE
Stroke support groups for patients, families, and friends/Stroke warning signs and symptoms/Signs and symptoms of stroke/Prescribed medications/Need for follow up after discharge/Stroke education booklet/Risk factors for stroke/Call 911 for stroke Anemia

## 2021-03-09 ENCOUNTER — EMERGENCY (EMERGENCY)
Facility: HOSPITAL | Age: 76
LOS: 1 days | Discharge: LEFT BEFORE DISCHARGE | End: 2021-03-09
Attending: EMERGENCY MEDICINE
Payer: MEDICARE

## 2021-03-09 VITALS
OXYGEN SATURATION: 100 % | HEART RATE: 74 BPM | RESPIRATION RATE: 15 BRPM | SYSTOLIC BLOOD PRESSURE: 123 MMHG | DIASTOLIC BLOOD PRESSURE: 72 MMHG

## 2021-03-09 VITALS
HEART RATE: 93 BPM | SYSTOLIC BLOOD PRESSURE: 110 MMHG | OXYGEN SATURATION: 98 % | RESPIRATION RATE: 18 BRPM | WEIGHT: 149.91 LBS | HEIGHT: 63 IN | DIASTOLIC BLOOD PRESSURE: 67 MMHG | TEMPERATURE: 99 F

## 2021-03-09 LAB
ALBUMIN SERPL ELPH-MCNC: 4.1 G/DL — SIGNIFICANT CHANGE UP (ref 3.3–5)
ALP SERPL-CCNC: 73 U/L — SIGNIFICANT CHANGE UP (ref 40–120)
ALT FLD-CCNC: 16 U/L — SIGNIFICANT CHANGE UP (ref 10–45)
ANION GAP SERPL CALC-SCNC: 13 MMOL/L — SIGNIFICANT CHANGE UP (ref 5–17)
APTT BLD: 28.8 SEC — SIGNIFICANT CHANGE UP (ref 27.5–35.5)
AST SERPL-CCNC: 21 U/L — SIGNIFICANT CHANGE UP (ref 10–40)
BASOPHILS # BLD AUTO: 0.01 K/UL — SIGNIFICANT CHANGE UP (ref 0–0.2)
BASOPHILS NFR BLD AUTO: 0.2 % — SIGNIFICANT CHANGE UP (ref 0–2)
BILIRUB SERPL-MCNC: 0.4 MG/DL — SIGNIFICANT CHANGE UP (ref 0.2–1.2)
BUN SERPL-MCNC: 14 MG/DL — SIGNIFICANT CHANGE UP (ref 7–23)
CALCIUM SERPL-MCNC: 9.6 MG/DL — SIGNIFICANT CHANGE UP (ref 8.4–10.5)
CHLORIDE SERPL-SCNC: 97 MMOL/L — SIGNIFICANT CHANGE UP (ref 96–108)
CO2 SERPL-SCNC: 24 MMOL/L — SIGNIFICANT CHANGE UP (ref 22–31)
CREAT SERPL-MCNC: 0.73 MG/DL — SIGNIFICANT CHANGE UP (ref 0.5–1.3)
EOSINOPHIL # BLD AUTO: 0.01 K/UL — SIGNIFICANT CHANGE UP (ref 0–0.5)
EOSINOPHIL NFR BLD AUTO: 0.2 % — SIGNIFICANT CHANGE UP (ref 0–6)
GLUCOSE SERPL-MCNC: 92 MG/DL — SIGNIFICANT CHANGE UP (ref 70–99)
HCT VFR BLD CALC: 39.2 % — SIGNIFICANT CHANGE UP (ref 34.5–45)
HGB BLD-MCNC: 13.2 G/DL — SIGNIFICANT CHANGE UP (ref 11.5–15.5)
IMM GRANULOCYTES NFR BLD AUTO: 0.4 % — SIGNIFICANT CHANGE UP (ref 0–1.5)
INR BLD: 1.05 RATIO — SIGNIFICANT CHANGE UP (ref 0.88–1.16)
LYMPHOCYTES # BLD AUTO: 1.23 K/UL — SIGNIFICANT CHANGE UP (ref 1–3.3)
LYMPHOCYTES # BLD AUTO: 25.9 % — SIGNIFICANT CHANGE UP (ref 13–44)
MCHC RBC-ENTMCNC: 29.4 PG — SIGNIFICANT CHANGE UP (ref 27–34)
MCHC RBC-ENTMCNC: 33.7 GM/DL — SIGNIFICANT CHANGE UP (ref 32–36)
MCV RBC AUTO: 87.3 FL — SIGNIFICANT CHANGE UP (ref 80–100)
MONOCYTES # BLD AUTO: 0.47 K/UL — SIGNIFICANT CHANGE UP (ref 0–0.9)
MONOCYTES NFR BLD AUTO: 9.9 % — SIGNIFICANT CHANGE UP (ref 2–14)
NEUTROPHILS # BLD AUTO: 3.01 K/UL — SIGNIFICANT CHANGE UP (ref 1.8–7.4)
NEUTROPHILS NFR BLD AUTO: 63.4 % — SIGNIFICANT CHANGE UP (ref 43–77)
NRBC # BLD: 0 /100 WBCS — SIGNIFICANT CHANGE UP (ref 0–0)
PLATELET # BLD AUTO: 172 K/UL — SIGNIFICANT CHANGE UP (ref 150–400)
POTASSIUM SERPL-MCNC: 4.1 MMOL/L — SIGNIFICANT CHANGE UP (ref 3.5–5.3)
POTASSIUM SERPL-SCNC: 4.1 MMOL/L — SIGNIFICANT CHANGE UP (ref 3.5–5.3)
PROT SERPL-MCNC: 7 G/DL — SIGNIFICANT CHANGE UP (ref 6–8.3)
PROTHROM AB SERPL-ACNC: 12.6 SEC — SIGNIFICANT CHANGE UP (ref 10.6–13.6)
RBC # BLD: 4.49 M/UL — SIGNIFICANT CHANGE UP (ref 3.8–5.2)
RBC # FLD: 14 % — SIGNIFICANT CHANGE UP (ref 10.3–14.5)
SARS-COV-2 RNA SPEC QL NAA+PROBE: DETECTED
SODIUM SERPL-SCNC: 134 MMOL/L — LOW (ref 135–145)
WBC # BLD: 4.75 K/UL — SIGNIFICANT CHANGE UP (ref 3.8–10.5)
WBC # FLD AUTO: 4.75 K/UL — SIGNIFICANT CHANGE UP (ref 3.8–10.5)

## 2021-03-09 PROCEDURE — 93005 ELECTROCARDIOGRAM TRACING: CPT

## 2021-03-09 PROCEDURE — U0005: CPT

## 2021-03-09 PROCEDURE — U0003: CPT

## 2021-03-09 PROCEDURE — G1004: CPT

## 2021-03-09 PROCEDURE — 85730 THROMBOPLASTIN TIME PARTIAL: CPT

## 2021-03-09 PROCEDURE — 71250 CT THORAX DX C-: CPT

## 2021-03-09 PROCEDURE — 99285 EMERGENCY DEPT VISIT HI MDM: CPT | Mod: 25

## 2021-03-09 PROCEDURE — 85025 COMPLETE CBC W/AUTO DIFF WBC: CPT

## 2021-03-09 PROCEDURE — 99285 EMERGENCY DEPT VISIT HI MDM: CPT | Mod: CS

## 2021-03-09 PROCEDURE — 71250 CT THORAX DX C-: CPT | Mod: 26,MF

## 2021-03-09 PROCEDURE — 99283 EMERGENCY DEPT VISIT LOW MDM: CPT | Mod: CS

## 2021-03-09 PROCEDURE — 36415 COLL VENOUS BLD VENIPUNCTURE: CPT

## 2021-03-09 PROCEDURE — 80053 COMPREHEN METABOLIC PANEL: CPT

## 2021-03-09 PROCEDURE — 85610 PROTHROMBIN TIME: CPT

## 2021-03-09 RX ORDER — ACETAMINOPHEN 500 MG
975 TABLET ORAL ONCE
Refills: 0 | Status: COMPLETED | OUTPATIENT
Start: 2021-03-09 | End: 2021-03-09

## 2021-03-09 RX ADMIN — Medication 975 MILLIGRAM(S): at 13:43

## 2021-03-09 NOTE — ED PROVIDER NOTE - NSFOLLOWUPINSTRUCTIONS_ED_ALL_ED_FT
Rib Fracture    A rib fracture is a break or crack in one of the bones of the ribs. The ribs are a group of long, curved bones that wrap around your chest and attach to your spine. They protect your lungs and other organs in the chest cavity. A broken or cracked rib is often painful, but most do not cause other problems. Most rib fractures heal on their own over time. However, rib fractures can be more serious if multiple ribs are broken or if broken ribs move out of place and push against other structures.     CAUSES  A direct blow to the chest. For example, this could happen during contact sports, a car accident, or a fall against a hard object.   Repetitive movements with high force, such as pitching a baseball or having severe coughing spells.     SYMPTOMS  Pain when you breathe in or cough.   Pain when someone presses on the injured area.     DIAGNOSIS  Your caregiver will perform a physical exam. Various imaging tests may be ordered to confirm the diagnosis and to look for related injuries. These tests may include a chest X-ray, computed tomography (CT), magnetic resonance imaging (MRI), or a bone scan.    TREATMENT  Rib fractures usually heal on their own in 1–3 months. The longer healing period is often associated with a continued cough or other aggravating activities. During the healing period, pain control is very important. Medication is usually given to control pain. Hospitalization or surgery may be needed for more severe injuries, such as those in which multiple ribs are broken or the ribs have moved out of place.     HOME CARE INSTRUCTIONS  Avoid strenuous activity and any activities or movements that cause pain. Be careful during activities and avoid bumping the injured rib.   Gradually increase activity as directed by your caregiver.  Only take over-the-counter or prescription medications as directed by your caregiver. Do not take other medications without asking your caregiver first.   Apply ice to the injured area for the first 1–2 days after you have been treated or as directed by your caregiver. Applying ice helps to reduce inflammation and pain.   Put ice in a plastic bag.  Place a towel between your skin and the bag.    Leave the ice on for 15–20 minutes at a time, every 2 hours while you are awake.  Perform deep breathing as directed by your caregiver. This will help prevent pneumonia, which is a common complication of a broken rib. Your caregiver may instruct you to:  Take deep breaths several times a day.   Try to cough several times a day, holding a pillow against the injured area.   Use a device called an incentive spirometer to practice deep breathing several times a day.  Drink enough fluids to keep your urine clear or pale yellow. This will help you avoid constipation.    Do not wear a rib belt or binder. These restrict breathing, which can lead to pneumonia.      SEEK IMMEDIATE MEDICAL CARE IF:  You have a fever.    You have difficulty breathing or shortness of breath.    You develop a continual cough, or you cough up thick or bloody sputum.  You feel sick to your stomach (nausea), throw up (vomit), or have abdominal pain.    You have worsening pain not controlled with medications.      MAKE SURE YOU:  Understand these instructions.   Will watch your condition.   Will get help right away if you are not doing well or get worse.     ADDITIONAL NOTES AND INSTRUCTIONS    Please follow up with your Primary MD in 24-48 hr.  Seek immediate medical care for any new/worsening signs or symptoms. You were seen today for a rib fracture of the right 5th and 6th ribs- you were advised for admission as you have some mucous in some of the airway and this may be due to covid19 or aspiration. You should follow up closely with your primary care doctor in 1-3 days. Use the incentive spirometer as we discussed. For pain control, take tylenol/motrin according to  label instructions. You can use over the counter 4% lidoderm patchs at the area of rib pain for supportive care. We offered to prescribe you stronger medications such as tramadol but you declined. Please speak to your doctor or call back if you find you are at increased pain.     RETURN FOR ANY SHORTNESS OF BREATH, FEVERS, OR CHEST PAIN.     Rib Fracture    A rib fracture is a break or crack in one of the bones of the ribs. The ribs are a group of long, curved bones that wrap around your chest and attach to your spine. They protect your lungs and other organs in the chest cavity. A broken or cracked rib is often painful, but most do not cause other problems. Most rib fractures heal on their own over time. However, rib fractures can be more serious if multiple ribs are broken or if broken ribs move out of place and push against other structures.     CAUSES  A direct blow to the chest. For example, this could happen during contact sports, a car accident, or a fall against a hard object.   Repetitive movements with high force, such as pitching a baseball or having severe coughing spells.     SYMPTOMS  Pain when you breathe in or cough.   Pain when someone presses on the injured area.     DIAGNOSIS  Your caregiver will perform a physical exam. Various imaging tests may be ordered to confirm the diagnosis and to look for related injuries. These tests may include a chest X-ray, computed tomography (CT), magnetic resonance imaging (MRI), or a bone scan.    TREATMENT  Rib fractures usually heal on their own in 1–3 months. The longer healing period is often associated with a continued cough or other aggravating activities. During the healing period, pain control is very important. Medication is usually given to control pain. Hospitalization or surgery may be needed for more severe injuries, such as those in which multiple ribs are broken or the ribs have moved out of place.     HOME CARE INSTRUCTIONS  Avoid strenuous activity and any activities or movements that cause pain. Be careful during activities and avoid bumping the injured rib.   Gradually increase activity as directed by your caregiver.  Only take over-the-counter or prescription medications as directed by your caregiver. Do not take other medications without asking your caregiver first.   Apply ice to the injured area for the first 1–2 days after you have been treated or as directed by your caregiver. Applying ice helps to reduce inflammation and pain.   Put ice in a plastic bag.  Place a towel between your skin and the bag.    Leave the ice on for 15–20 minutes at a time, every 2 hours while you are awake.  Perform deep breathing as directed by your caregiver. This will help prevent pneumonia, which is a common complication of a broken rib. Your caregiver may instruct you to:  Take deep breaths several times a day.   Try to cough several times a day, holding a pillow against the injured area.   Use a device called an incentive spirometer to practice deep breathing several times a day.  Drink enough fluids to keep your urine clear or pale yellow. This will help you avoid constipation.    Do not wear a rib belt or binder. These restrict breathing, which can lead to pneumonia.      SEEK IMMEDIATE MEDICAL CARE IF:  You have a fever.    You have difficulty breathing or shortness of breath.    You develop a continual cough, or you cough up thick or bloody sputum.  You feel sick to your stomach (nausea), throw up (vomit), or have abdominal pain.    You have worsening pain not controlled with medications.      MAKE SURE YOU:  Understand these instructions.   Will watch your condition.   Will get help right away if you are not doing well or get worse.     ADDITIONAL NOTES AND INSTRUCTIONS    Please follow up with your Primary MD in 24-48 hr.  Seek immediate medical care for any new/worsening signs or symptoms.

## 2021-03-09 NOTE — ED ADULT NURSE REASSESSMENT NOTE - NS ED NURSE REASSESS COMMENT FT1
Pt given incentive spiromoter and educated on proper use. Pt demonstrated proper use and verbalized understanding.

## 2021-03-09 NOTE — ED ADULT NURSE REASSESSMENT NOTE - NS ED NURSE REASSESS COMMENT FT1
pt is awake and alert, resting comfortably in stretcher, speaking in full coherent sentences. Pt endorses no pain or discomfort at this time. Pt ambulated independently with steady gait. Will continue to monitor.

## 2021-03-09 NOTE — ED ADULT NURSE REASSESSMENT NOTE - NS ED NURSE REASSESS COMMENT FT1
pt is awake and alert, resting comfortably in stretcher, speaking in full coherent sentences. Pt endorses no pain or discomfort at this time. Pt awaiting Trauma consult. Will continue to monitor.

## 2021-03-09 NOTE — ED PROVIDER NOTE - PROGRESS NOTE DETAILS
Burke, PGY1: Nondisplaced right lateral fifth and sixth rib fracture; surgery consulted given new hospital protocol. Patient able to blow 1000+ on IS Busch DO: patient evaluated by trauma team- recommends admission given increased risk of deterioration in the setting of COVID19. Discussed w/ pt, pt adamant about going home- does not wish to stay for medicine consult. ISS is > 1000. Pt understands she is increased risk of pneumonia, deterioration of respiratory status without admission, and death if untreated and deterioration more rapid that anticipated. The patient has decided to leave against medical advice.  The patient is AAOx3, not intoxicated, and displays normal decision making ability. We discussed all risks, benefits, and alternatives to the progression of treatment and the potential dangers of leaving including but not limited to permanent disability, injury, and death.  The patient was instructed that they are welcome to change their decision to leave against medical advice and return to the emergency department at any time and for any reason in order to allow us to render care. Pt will be referred for [facility] follow up - also offered pt pain control as recommended by trauma- pt adamant about not taking more meds than tylenol- did not want to provide pharmacy for medication rx- pt amenable to call back and follow up with pmd if she needs medications. Pt stable for dc - on room air. Busch DO: patient evaluated by trauma team- recommends admission given increased risk of deterioration in the setting of COVID19. Discussed w/ pt, pt adamant about going home- does not wish to stay for medicine consult. ISS is > 1000. Pt understands she is increased risk of pneumonia, deterioration of respiratory status without admission, and death if untreated and deterioration more rapid than anticipated. The patient has decided to leave against medical advice.  The patient is AAOx3, not intoxicated, and displays normal decision making ability. We discussed all risks, benefits, and alternatives to the progression of treatment and the potential dangers of leaving including but not limited to permanent disability, injury, and death.  The patient was instructed that they are welcome to change their decision to leave against medical advice and return to the emergency department at any time and for any reason in order to allow us to render care. Pt will be referred for [facility] follow up - also offered pt pain control as recommended by trauma- pt adamant about not taking more meds than tylenol- did not want to provide pharmacy for medication rx- pt amenable to call back and follow up with pmd if she needs medications. Pt stable for dc - on room air.

## 2021-03-09 NOTE — ED PROVIDER NOTE - CLINICAL SUMMARY MEDICAL DECISION MAKING FREE TEXT BOX
75yo F w/ no significant pmh coming in w/ generalized body aches since Saturday. Will evaluate for rib fractures  Plan: labs + CT hest + pain control and reassess

## 2021-03-09 NOTE — ED PROVIDER NOTE - ATTENDING CONTRIBUTION TO CARE
Patient presenting complaining of L sided chest wall pains after fall out of bed, also found to be incidentally COVID positive at urgent care.  Denying shortness of breath.  On exam patient with point tenderness of L chest wall in midaxillary line in mid/lower ribs, no bruising/crepitus.  Plan for labs, CT chest to evaluate for rib fractures, pain control, re-evaluate.

## 2021-03-09 NOTE — ED PROVIDER NOTE - OBJECTIVE STATEMENT
77yo F w/ no significant pmh coming in w/ generalized body aches since Saturday. Patient initially had a negative rapid PCR on Friday and re-tested positive on Tuesday. Patient also notes falling out of bed while sleeping and hurt her left side. Patient has otherwise been in her normal state of health; denies any fever/chills, SOB, abdominal pain or diarrhea.

## 2021-03-09 NOTE — ED ADULT NURSE NOTE - OBJECTIVE STATEMENT
pt 75 yo female allie Noel MD pt hx covid positive today after feeling body aches x few days and had negative test 4 dys ago pt comfortable is a smoker 1ppd pt states fall oob yesterday and has left side chato pain which increases with inspiration skin warm dry placed on isolation precautions 77 y/o F A&Ox3 denies PMH/ PSH presents to the ED from urgent care c/o rib pain. Pt reports falling out of bed last night and landing on her left side. Pt denies hitting her head of LOC. Pt endorses generalized weakness and aches since Saturday. Pt reports going to  this morning for L rib pain. Pt tested positive for Covid @ ,  advised pt to be seen in the ED. Upon arrival to ED pt is well appearing. Breathing is even and unlabored, satting 100% RA. Skin is warm, dry & in tact. Pt denies CP, SOB, fevers, chills, HA, N/V/D. IV access obtained. Call bell wthin reach, comfort & safety provided.

## 2021-03-09 NOTE — CONSULT NOTE ADULT - ATTENDING COMMENTS
Pt seen and examined.  Chart reviewed.  Resident note confirmed.  Pt is a 76 year old female with no sigificant medical history who presents to [facility] s/p fall from bed. Of note, she tested Covid-19 positive earlier that day. Work up revealed two right rib fx. Pt is able to pull 1L on ISP. Her pain is well controlled.    PMH/PSH/MEDS/ALL/SH/FH/ROS: Unchanged from H&P  Vitals/PE/Labs/Radiographs: Reviewed    A/p  Fall with rib fx  Covid-19 positive  Start multimodal pain control  ISP to prevent pneumonia  Medicine service consultation  Pt refusing admission  Risks/benefits d/w patient  Will follow with you.

## 2021-03-09 NOTE — ED PROVIDER NOTE - NSPTACCESSSVCSAPPTDETAILS_ED_ALL_ED_FT
URGENT- please refer to [facility] program for covid19 patients- pt has 2 rib fractures and reported covdi19

## 2021-03-09 NOTE — CONSULT NOTE ADULT - ASSESSMENT
ASSESSMENT: Patient is a 76F found to have rib fx x 2 after falling out of bed    PLAN:    - Had extensive discussion w/ the pt stating that we would like to admit her for further pain control and Medicine evaluation in setting of active COVID infection and rib fracture  - Despite explanation of risks of leaving the hospital against medical advice, pt states she needs to leave the hospital and cannot stay overnight. Trauma surgery team recommended pt be seen by Medicine team for COVID management, and pt was amenable to it.   - If pt is to leave the hospital, recommend discharging the pt w/ prescriptions for Tramadol and lidocaine patch, and encourage incentive spirometer use 10 times/hr at home  - Examined and discussed w/ Trauma surgery attending on call, Dr. Angy Pelayo PGY-3  ACS team

## 2021-03-09 NOTE — ED PROVIDER NOTE - NS ED ROS FT
General: denies fever, chills  HENT: denies nasal congestion, rhinorrhea  Eyes: denies visual changes, blurred vision  CV: denies chest pain, palpitations  Resp: denies difficulty breathing, cough  Abdominal: denies nausea, vomiting, diarrhea, abdominal pain  : denies urinary pain or discharge  MSK: left sided rib pain  Neuro: denies headaches, numbness, tingling  Skin: denies rashes, bruises

## 2021-03-09 NOTE — ED PROVIDER NOTE - PATIENT PORTAL LINK FT
You can access the FollowMyHealth Patient Portal offered by [facility] by registering at the following website: [email or web address]. By joining [facility]’s FollowMyHealth portal, you will also be able to view your health information using other applications (apps) compatible with our system.

## 2021-03-09 NOTE — CONSULT NOTE ADULT - SUBJECTIVE AND OBJECTIVE BOX
TRAUMA SERVICE (Acute Care Surgery / ACS - #[id number]) - CONSULT NOTE  --------------------------------------------------------------------------------------------    TRAUMA ACTIVATION LEVEL: NONE    MECHANISM OF INJURY:      [] Blunt  	[] MVC	[x] Fall	[] Pedestrian Struck	[] Motorcycle accident      [] Penetrating  	[] Gun Shot Wound 		[] Stab Wound    GCS: 15 	E: 4	V: 5	M: 6    Patient is a 76y old  Female who presents with a chief complaint of     HPI: 76F w/ no significant PMH/PSH other than COVID+ test result from today from urgent care, presents after falling out of bed while asleep dreaming. Pt denies LOC, trauma to the head, or any other symptoms other than pain on the chest where she fell on. Pt went to urgent care due to pain, tested + for COVID, and came to ED.     In ED, vitals stable wnl, exam w/ minimal tenderness to R chest, labs unremarkable, and CT chest showing rib fx on 5th and 6th ribs on the Right. COVID PCR positive. Pt was able to pull 1000cc on incentive spirometer, and states pain is very well controlled without any pain meds. In addition, pt had chills for two days over the weekend, possibly associated w/ her COVID infection.     Primary Survey:    A - airway intact  B - bilateral breath sounds and good chest rise  C - initial BP  BP: 123/72 (03-09-21 @ 17:20) *** , HR HR: 74 (03-09-21 @ 17:20) *** , palpable pulses in all extremities  D - GCS 15 on arrival  Exposure obtained      Secondary Survey:   General: NAD  HEENT: Normocephalic, atraumatic, EOMI, PEERLA.  Neck: Soft, midline trachea.  Chest: No chest wall tenderness.   Cardiac: S1, S2, RRR  Respiratory: Bilateral breath sounds, clear and equal bilaterally  Abdomen: Soft, non-distended, non-tender, no rebound, no guarding, no masses palpated  Groin: Normal appearing  Back: no TTP, no palpable runoff/stepoff/deformity  Rectal: No amandeep blood, JESSICA with good tone    Patient denies fevers/chills, denies lightheadedness/dizziness, denies SOB/chest pain, denies nausea/vomiting, denies constipation/diarrhea.      ROS: 10-system review is otherwise negative except HPI above.      PAST MEDICAL & SURGICAL HISTORY:  No pertinent past medical history    No significant past surgical history      FAMILY HISTORY:    [] Family history not pertinent as reviewed with the patient and family    SOCIAL HISTORY:      ALLERGIES: No Known Allergies      HOME MEDICATIONS:     CURRENT MEDICATIONS  MEDICATIONS (STANDING):   MEDICATIONS (PRN):  --------------------------------------------------------------------------------------------    Vitals:   T(C): 37.2 (03-09-21 @ 13:06), Max: 37.3 (03-09-21 @ 12:19)  HR: 74 (03-09-21 @ 17:20) (74 - 93)  BP: 123/72 (03-09-21 @ 17:20) (110/67 - 123/72)  RR: 15 (03-09-21 @ 17:20) (15 - 18)  SpO2: 100% (03-09-21 @ 17:20) (98% - 100%)  CAPILLARY BLOOD GLUCOSE        CAPILLARY BLOOD GLUCOSE          Height (cm): 160 (03-09 @ 12:19)  Weight (kg): 68 (03-09 @ 12:19)  BMI (kg/m2): 26.6 (03-09 @ 12:19)  BSA (m2): 1.71 (03-09 @ 12:19)    --------------------------------------------------------------------------------------------    LABS  CBC (03-09 @ 14:20)                              13.2                           4.75    )----------------(  172        63.4  % Neutrophils, 25.9  % Lymphocytes, ANC: 3.01                                39.2      BMP (03-09 @ 14:20)             134<L>  |  97      |  14    		Ca++ --      Ca 9.6                ---------------------------------( 92    		Mg --                 4.1     |  24      |  0.73  			Ph --        LFTs (03-09 @ 14:20)      TPro 7.0 / Alb 4.1 / TBili 0.4 / DBili -- / AST 21 / ALT 16 / AlkPhos 73    Coags (03-09 @ 14:20)  aPTT 28.8 / INR 1.05 / PT 12.6          --------------------------------------------------------------------------------------------    MICROBIOLOGY      --------------------------------------------------------------------------------------------    IMAGING      -------------------------------------------------------------------------------------------  < from: CT Chest No Cont (03.09.21 @ 16:51) >    EXAM:  CT CHEST                            PROCEDURE DATE:  03/09/2021            INTERPRETATION:  CLINICAL INFORMATION: Rib fracture suspected, trauma, Covid positive.    COMPARISON: None.    CONTRAST/COMPLICATIONS:  IV Contrast: None  Oral Contrast: None  Complications: None reported at time of study completion    PROCEDURE:  CT of the Chest was performed.  Sagittal and coronal reformats were performed.    FINDINGS:    LUNGS AND AIRWAYS: Mucus/secretions are noted within several of the bronchi in the right lower lobe.  5 mm right lower lobe nodule (2:71), 3 mm left lower lobe nodule (2:85). Small cystic focus in the right lower lobe (2:61).  PLEURA: No pleural effusion. No pneumothorax is noted.  MEDIASTINUM AND VISHAL: No lymphadenopathy.  VESSELS: Aortic and coronary calcifications.  HEART: Heart size is normal. No pericardial effusion.  CHEST WALL AND LOWER NECK: Circumferential thickening of the esophagus is noted.  VISUALIZED UPPER ABDOMEN: Within normal limits.  BONES: Nondisplaced right lateral fifth and sixth rib fractures. Generative changes of the spine.    IMPRESSION:  Nondisplaced right lateral fifth and sixth rib fractures. No pneumothorax is noted.                HALI COOMBS MD; Resident Radiology  This document hasbeen electronically signed.  SARA COSTA MD; Attending Radiologist  This document has been electronically signed. Mar  9 2021  5:08PM    < end of copied text >

## 2021-03-09 NOTE — ED PROVIDER NOTE - PHYSICAL EXAMINATION
GENERAL: well appearing in no acute distress, non-toxic appearing  HEAD: normocephalic, atraumatic  HENT: airway intact, neck supple  EYES: normal conjunctiva  CARDIAC: regular rate and rhythm, normal S1S2, no appreciable murmurs, 2+ pulses in UE/LE b/l  PULM: normal breath sounds, clear to ascultation bilaterally, no rales, rhonchi, wheezing  GI: abdomen nondistended, soft, nontender, no guarding, rebound tenderness  : no CVA tenderness b/l, no suprapubic tenderness  NEURO: no focal motor or sensory deficits,  MSK: point tenderness of left chest wall in midaxillary line  SKIN: well-perfused, extremities warm, no visible rashes  PSYCH: appropriate mood and affect GENERAL: well appearing in no acute distress, non-toxic appearing  HEAD: normocephalic, atraumatic  HENT: airway intact, neck supple  EYES: normal conjunctiva  CARDIAC: regular rate and rhythm, normal S1S2, no appreciable murmurs, 2+ pulses in UE/LE b/l  PULM: normal breath sounds, clear to ascultation bilaterally, no rales, rhonchi, wheezing  GI: abdomen nondistended, soft, nontender, no guarding, rebound tenderness  NEURO: no focal motor or sensory deficits,  MSK: point tenderness of left chest wall in midaxillary line  SKIN: well-perfused, extremities warm, no visible rashes  PSYCH: appropriate mood and affect

## 2021-04-08 ENCOUNTER — APPOINTMENT (OUTPATIENT)
Dept: PHARMACY | Facility: CLINIC | Age: 76
End: 2021-04-08
Payer: SELF-PAY

## 2021-04-08 PROCEDURE — V5299A: CUSTOM | Mod: NC

## 2021-04-22 ENCOUNTER — APPOINTMENT (OUTPATIENT)
Dept: SPEECH THERAPY | Facility: CLINIC | Age: 76
End: 2021-04-22

## 2021-04-29 ENCOUNTER — APPOINTMENT (OUTPATIENT)
Dept: PHARMACY | Facility: CLINIC | Age: 76
End: 2021-04-29
Payer: SELF-PAY

## 2021-04-29 PROCEDURE — V5299A: CUSTOM | Mod: NC,RT

## 2021-05-13 ENCOUNTER — OUTPATIENT (OUTPATIENT)
Dept: OUTPATIENT SERVICES | Facility: HOSPITAL | Age: 76
LOS: 1 days | Discharge: HOME | End: 2021-05-13

## 2021-05-13 ENCOUNTER — APPOINTMENT (OUTPATIENT)
Dept: SPEECH THERAPY | Facility: CLINIC | Age: 76
End: 2021-05-13

## 2021-05-13 ENCOUNTER — APPOINTMENT (OUTPATIENT)
Dept: PHARMACY | Facility: CLINIC | Age: 76
End: 2021-05-13
Payer: SELF-PAY

## 2021-05-13 PROCEDURE — V5299A: CUSTOM | Mod: NC

## 2021-05-17 DIAGNOSIS — H90.A32 MIXED CONDUCTIVE AND SENSORINEURAL HEARING LOSS, UNILATERAL, LEFT EAR WITH RESTRICTED HEARING ON THE CONTRALATERAL SIDE: ICD-10-CM

## 2021-05-17 DIAGNOSIS — H90.A21 SENSORINEURAL HEARING LOSS, UNILATERAL, RIGHT EAR, WITH RESTRICTED HEARING ON THE CONTRALATERAL SIDE: ICD-10-CM

## 2022-07-05 ENCOUNTER — APPOINTMENT (OUTPATIENT)
Dept: OTOLARYNGOLOGY | Facility: CLINIC | Age: 77
End: 2022-07-05

## 2022-07-05 ENCOUNTER — NON-APPOINTMENT (OUTPATIENT)
Age: 77
End: 2022-07-05

## 2022-07-05 ENCOUNTER — APPOINTMENT (OUTPATIENT)
Dept: PHARMACY | Facility: CLINIC | Age: 77
End: 2022-07-05

## 2022-07-05 VITALS
WEIGHT: 143 LBS | BODY MASS INDEX: 25.34 KG/M2 | DIASTOLIC BLOOD PRESSURE: 84 MMHG | HEIGHT: 63 IN | HEART RATE: 68 BPM | SYSTOLIC BLOOD PRESSURE: 155 MMHG

## 2022-07-05 PROCEDURE — 99213 OFFICE O/P EST LOW 20 MIN: CPT

## 2022-07-05 PROCEDURE — V5266A: CUSTOM

## 2022-07-05 PROCEDURE — V5299A: CUSTOM

## 2022-07-05 PROCEDURE — 92557 COMPREHENSIVE HEARING TEST: CPT

## 2022-07-05 PROCEDURE — 92504 EAR MICROSCOPY EXAMINATION: CPT

## 2022-07-05 PROCEDURE — 92567 TYMPANOMETRY: CPT

## 2022-07-05 RX ORDER — FEXOFENADINE HYDROCHLORIDE 60 MG/1
TABLET, FILM COATED ORAL
Refills: 0 | Status: ACTIVE | COMMUNITY

## 2022-07-05 RX ORDER — AMOXICILLIN 500 MG/1
500 CAPSULE ORAL
Qty: 21 | Refills: 0 | Status: COMPLETED | COMMUNITY
Start: 2022-01-12

## 2022-07-05 RX ORDER — CHLORHEXIDINE GLUCONATE, 0.12% ORAL RINSE 1.2 MG/ML
0.12 SOLUTION DENTAL
Qty: 473 | Refills: 0 | Status: COMPLETED | COMMUNITY
Start: 2022-01-12

## 2022-07-05 RX ORDER — IBUPROFEN 600 MG/1
600 TABLET, FILM COATED ORAL
Qty: 12 | Refills: 0 | Status: COMPLETED | COMMUNITY
Start: 2022-01-12

## 2022-07-05 NOTE — HISTORY OF PRESENT ILLNESS
[de-identified] : 77 year old female here for hearing loss and tinnitus \par History of moderate to moderately severe sensorineural hearing loss bilaterally. (left worse than right)\par Reports hearing has gotten worse-Currently using bilateral hearing aid with improvement \par Reports intermittent left ear tinnitus starting about 3 months ago \par Describes tinnitus as a "train running"\par Reports intermittent dizziness-occurs with changes in position.\par Reports daily episodes of vertigo but states the episodes are brief. \par Reports recent vertigo episodes about 2 week ago- lasting about 20-30 seconds-resolved on its own\par Patient denies otalgia, otorrhea, ear infections, headaches related to hearing.

## 2022-07-05 NOTE — PHYSICAL EXAM
[Hearing Loss Right Only] : diminished [Hearing Loss Left Only] : diminished [Rinne Test Air Conduction Persists > Bone Conduction Right] : air conduction greater than bone conduction on the right [Rinne Test Air Conduction Persists > Bone Conduction Left] : air conduction greater than bone conduction on the left [Hearing Frazier Test (Tuning Fork On Forehead)] : no lateralization of tone [Midline] : trachea located in midline position [Binocular Microscopic Exam] : Binocular microscopic exam was performed [Normal] : the left mastoid was normal [Nystagmus] : ~T no ~M nystagmus was seen [Fukuda Step Test] : Fukuda Step Test was Negative [Romberg's Sign] : Romberg's sign was absent [Fistula Sign] : Fistula Sign: Negative [Past-Pointing] : Past-Pointing: Negative [Eduard-Hallmanuelke] : Drummond-Hallpike: Negative [de-identified] : retracted [de-identified] : retracted

## 2022-07-05 NOTE — DATA REVIEWED
[de-identified] : An audiogram was ordered and performed including tympanometry, pure tones and speech, for patient's complaint of hearing loss\par I have independently reviewed the patient's audiogram from today and my findings include laurie SNHL with B and C tymps

## 2022-07-28 ENCOUNTER — APPOINTMENT (OUTPATIENT)
Dept: PHARMACY | Facility: CLINIC | Age: 77
End: 2022-07-28

## 2022-07-28 PROCEDURE — V5299A: CUSTOM | Mod: NC

## 2022-08-04 ENCOUNTER — APPOINTMENT (OUTPATIENT)
Dept: PHARMACY | Facility: CLINIC | Age: 77
End: 2022-08-04

## 2022-08-04 PROCEDURE — V5299A: CUSTOM | Mod: NC

## 2022-08-30 ENCOUNTER — NON-APPOINTMENT (OUTPATIENT)
Age: 77
End: 2022-08-30

## 2022-08-30 ENCOUNTER — APPOINTMENT (OUTPATIENT)
Dept: PHARMACY | Facility: CLINIC | Age: 77
End: 2022-08-30

## 2022-08-30 PROCEDURE — V5014D: CUSTOM | Mod: RT

## 2022-08-30 PROCEDURE — V5299I: CUSTOM | Mod: RT

## 2022-09-15 ENCOUNTER — APPOINTMENT (OUTPATIENT)
Dept: PHARMACY | Facility: CLINIC | Age: 77
End: 2022-09-15

## 2022-09-15 PROCEDURE — V5299A: CUSTOM | Mod: NC

## 2022-09-29 ENCOUNTER — APPOINTMENT (OUTPATIENT)
Dept: PHARMACY | Facility: CLINIC | Age: 77
End: 2022-09-29

## 2022-09-29 PROCEDURE — V5299A: CUSTOM | Mod: NC

## 2023-01-28 ENCOUNTER — INPATIENT (INPATIENT)
Facility: HOSPITAL | Age: 78
LOS: 1 days | Discharge: HOME | DRG: 156 | End: 2023-01-30
Attending: STUDENT IN AN ORGANIZED HEALTH CARE EDUCATION/TRAINING PROGRAM | Admitting: HOSPITALIST
Payer: MEDICARE

## 2023-01-28 VITALS
SYSTOLIC BLOOD PRESSURE: 135 MMHG | HEART RATE: 96 BPM | WEIGHT: 149.91 LBS | HEIGHT: 64 IN | OXYGEN SATURATION: 98 % | RESPIRATION RATE: 20 BRPM | TEMPERATURE: 98 F | DIASTOLIC BLOOD PRESSURE: 78 MMHG

## 2023-01-28 PROCEDURE — 99053 MED SERV 10PM-8AM 24 HR FAC: CPT

## 2023-01-28 PROCEDURE — 99285 EMERGENCY DEPT VISIT HI MDM: CPT

## 2023-01-29 DIAGNOSIS — J04.30 SUPRAGLOTTITIS, UNSPECIFIED, WITHOUT OBSTRUCTION: ICD-10-CM

## 2023-01-29 DIAGNOSIS — Z71.6 TOBACCO ABUSE COUNSELING: ICD-10-CM

## 2023-01-29 DIAGNOSIS — J38.4 EDEMA OF LARYNX: ICD-10-CM

## 2023-01-29 LAB
ALBUMIN SERPL ELPH-MCNC: 4.5 G/DL — SIGNIFICANT CHANGE UP (ref 3.3–5)
ALP SERPL-CCNC: 66 U/L — SIGNIFICANT CHANGE UP (ref 40–120)
ALT FLD-CCNC: 8 U/L — LOW (ref 10–45)
ANION GAP SERPL CALC-SCNC: 12 MMOL/L — SIGNIFICANT CHANGE UP (ref 5–17)
APTT BLD: 28.7 SEC — SIGNIFICANT CHANGE UP (ref 27.5–35.5)
AST SERPL-CCNC: 13 U/L — SIGNIFICANT CHANGE UP (ref 10–40)
BASOPHILS # BLD AUTO: 0.04 K/UL — SIGNIFICANT CHANGE UP (ref 0–0.2)
BASOPHILS NFR BLD AUTO: 0.3 % — SIGNIFICANT CHANGE UP (ref 0–2)
BILIRUB SERPL-MCNC: 0.4 MG/DL — SIGNIFICANT CHANGE UP (ref 0.2–1.2)
BLD GP AB SCN SERPL QL: NEGATIVE — SIGNIFICANT CHANGE UP
BUN SERPL-MCNC: 26 MG/DL — HIGH (ref 7–23)
CALCIUM SERPL-MCNC: 10 MG/DL — SIGNIFICANT CHANGE UP (ref 8.4–10.5)
CHLORIDE SERPL-SCNC: 100 MMOL/L — SIGNIFICANT CHANGE UP (ref 96–108)
CO2 SERPL-SCNC: 24 MMOL/L — SIGNIFICANT CHANGE UP (ref 22–31)
CREAT SERPL-MCNC: 0.85 MG/DL — SIGNIFICANT CHANGE UP (ref 0.5–1.3)
EGFR: 71 ML/MIN/1.73M2 — SIGNIFICANT CHANGE UP
EOSINOPHIL # BLD AUTO: 0.09 K/UL — SIGNIFICANT CHANGE UP (ref 0–0.5)
EOSINOPHIL NFR BLD AUTO: 0.8 % — SIGNIFICANT CHANGE UP (ref 0–6)
GLUCOSE SERPL-MCNC: 109 MG/DL — HIGH (ref 70–99)
HCT VFR BLD CALC: 42.1 % — SIGNIFICANT CHANGE UP (ref 34.5–45)
HGB BLD-MCNC: 13.7 G/DL — SIGNIFICANT CHANGE UP (ref 11.5–15.5)
IMM GRANULOCYTES NFR BLD AUTO: 0.4 % — SIGNIFICANT CHANGE UP (ref 0–0.9)
INR BLD: 1 RATIO — SIGNIFICANT CHANGE UP (ref 0.88–1.16)
LYMPHOCYTES # BLD AUTO: 18.3 % — SIGNIFICANT CHANGE UP (ref 13–44)
LYMPHOCYTES # BLD AUTO: 2.18 K/UL — SIGNIFICANT CHANGE UP (ref 1–3.3)
MCHC RBC-ENTMCNC: 29.1 PG — SIGNIFICANT CHANGE UP (ref 27–34)
MCHC RBC-ENTMCNC: 32.5 GM/DL — SIGNIFICANT CHANGE UP (ref 32–36)
MCV RBC AUTO: 89.4 FL — SIGNIFICANT CHANGE UP (ref 80–100)
MONOCYTES # BLD AUTO: 1.1 K/UL — HIGH (ref 0–0.9)
MONOCYTES NFR BLD AUTO: 9.2 % — SIGNIFICANT CHANGE UP (ref 2–14)
NEUTROPHILS # BLD AUTO: 8.47 K/UL — HIGH (ref 1.8–7.4)
NEUTROPHILS NFR BLD AUTO: 71 % — SIGNIFICANT CHANGE UP (ref 43–77)
NRBC # BLD: 0 /100 WBCS — SIGNIFICANT CHANGE UP (ref 0–0)
PLATELET # BLD AUTO: 321 K/UL — SIGNIFICANT CHANGE UP (ref 150–400)
POTASSIUM SERPL-MCNC: 4.2 MMOL/L — SIGNIFICANT CHANGE UP (ref 3.5–5.3)
POTASSIUM SERPL-SCNC: 4.2 MMOL/L — SIGNIFICANT CHANGE UP (ref 3.5–5.3)
PROT SERPL-MCNC: 7.6 G/DL — SIGNIFICANT CHANGE UP (ref 6–8.3)
PROTHROM AB SERPL-ACNC: 11.5 SEC — SIGNIFICANT CHANGE UP (ref 10.5–13.4)
RAPID RVP RESULT: SIGNIFICANT CHANGE UP
RBC # BLD: 4.71 M/UL — SIGNIFICANT CHANGE UP (ref 3.8–5.2)
RBC # FLD: 13.9 % — SIGNIFICANT CHANGE UP (ref 10.3–14.5)
RH IG SCN BLD-IMP: POSITIVE — SIGNIFICANT CHANGE UP
RH IG SCN BLD-IMP: POSITIVE — SIGNIFICANT CHANGE UP
SARS-COV-2 RNA SPEC QL NAA+PROBE: SIGNIFICANT CHANGE UP
SODIUM SERPL-SCNC: 136 MMOL/L — SIGNIFICANT CHANGE UP (ref 135–145)
WBC # BLD: 11.93 K/UL — HIGH (ref 3.8–10.5)
WBC # FLD AUTO: 11.93 K/UL — HIGH (ref 3.8–10.5)

## 2023-01-29 PROCEDURE — 71045 X-RAY EXAM CHEST 1 VIEW: CPT | Mod: 26

## 2023-01-29 PROCEDURE — 70491 CT SOFT TISSUE NECK W/DYE: CPT | Mod: 26,MA

## 2023-01-29 PROCEDURE — 99223 1ST HOSP IP/OBS HIGH 75: CPT

## 2023-01-29 RX ORDER — ONDANSETRON 8 MG/1
4 TABLET, FILM COATED ORAL EVERY 8 HOURS
Refills: 0 | Status: DISCONTINUED | OUTPATIENT
Start: 2023-01-29 | End: 2023-01-30

## 2023-01-29 RX ORDER — CEFTRIAXONE 500 MG/1
1 INJECTION, POWDER, FOR SOLUTION INTRAMUSCULAR; INTRAVENOUS EVERY 24 HOURS
Refills: 0 | Status: DISCONTINUED | OUTPATIENT
Start: 2023-01-30 | End: 2023-01-30

## 2023-01-29 RX ORDER — FAMOTIDINE 10 MG/ML
20 INJECTION INTRAVENOUS ONCE
Refills: 0 | Status: COMPLETED | OUTPATIENT
Start: 2023-01-29 | End: 2023-01-29

## 2023-01-29 RX ORDER — DEXAMETHASONE 0.5 MG/5ML
6 ELIXIR ORAL ONCE
Refills: 0 | Status: COMPLETED | OUTPATIENT
Start: 2023-01-29 | End: 2023-01-29

## 2023-01-29 RX ORDER — CEFTRIAXONE 500 MG/1
1000 INJECTION, POWDER, FOR SOLUTION INTRAMUSCULAR; INTRAVENOUS ONCE
Refills: 0 | Status: COMPLETED | OUTPATIENT
Start: 2023-01-29 | End: 2023-01-29

## 2023-01-29 RX ORDER — LANOLIN ALCOHOL/MO/W.PET/CERES
3 CREAM (GRAM) TOPICAL AT BEDTIME
Refills: 0 | Status: DISCONTINUED | OUTPATIENT
Start: 2023-01-29 | End: 2023-01-30

## 2023-01-29 RX ORDER — DEXAMETHASONE 0.5 MG/5ML
8 ELIXIR ORAL EVERY 8 HOURS
Refills: 0 | Status: DISCONTINUED | OUTPATIENT
Start: 2023-01-29 | End: 2023-01-29

## 2023-01-29 RX ORDER — ACETAMINOPHEN 500 MG
650 TABLET ORAL EVERY 6 HOURS
Refills: 0 | Status: DISCONTINUED | OUTPATIENT
Start: 2023-01-29 | End: 2023-01-30

## 2023-01-29 RX ORDER — DEXAMETHASONE 0.5 MG/5ML
8 ELIXIR ORAL EVERY 8 HOURS
Refills: 0 | Status: DISCONTINUED | OUTPATIENT
Start: 2023-01-29 | End: 2023-01-30

## 2023-01-29 RX ORDER — DEXAMETHASONE 0.5 MG/5ML
2 ELIXIR ORAL ONCE
Refills: 0 | Status: COMPLETED | OUTPATIENT
Start: 2023-01-29 | End: 2023-01-29

## 2023-01-29 RX ORDER — NICOTINE POLACRILEX 2 MG
1 GUM BUCCAL DAILY
Refills: 0 | Status: DISCONTINUED | OUTPATIENT
Start: 2023-01-29 | End: 2023-01-30

## 2023-01-29 RX ORDER — VANCOMYCIN HCL 1 G
1000 VIAL (EA) INTRAVENOUS ONCE
Refills: 0 | Status: COMPLETED | OUTPATIENT
Start: 2023-01-29 | End: 2023-01-29

## 2023-01-29 RX ORDER — DIPHENHYDRAMINE HCL 50 MG
25 CAPSULE ORAL ONCE
Refills: 0 | Status: COMPLETED | OUTPATIENT
Start: 2023-01-29 | End: 2023-01-29

## 2023-01-29 RX ADMIN — Medication 2 MILLIGRAM(S): at 04:09

## 2023-01-29 RX ADMIN — Medication 25 MILLIGRAM(S): at 00:51

## 2023-01-29 RX ADMIN — Medication 6 MILLIGRAM(S): at 00:49

## 2023-01-29 RX ADMIN — Medication 101.6 MILLIGRAM(S): at 23:34

## 2023-01-29 RX ADMIN — CEFTRIAXONE 100 MILLIGRAM(S): 500 INJECTION, POWDER, FOR SOLUTION INTRAMUSCULAR; INTRAVENOUS at 06:46

## 2023-01-29 RX ADMIN — Medication 101.6 MILLIGRAM(S): at 13:28

## 2023-01-29 RX ADMIN — Medication 250 MILLIGRAM(S): at 07:31

## 2023-01-29 RX ADMIN — Medication 1 PATCH: at 13:28

## 2023-01-29 RX ADMIN — FAMOTIDINE 20 MILLIGRAM(S): 10 INJECTION INTRAVENOUS at 00:46

## 2023-01-29 RX ADMIN — Medication 1 PATCH: at 19:00

## 2023-01-29 NOTE — H&P ADULT - ASSESSMENT
77F with no PMHx presents to the ED admitted for supraglottitis, with no airway compromise. Reports voice change.

## 2023-01-29 NOTE — PATIENT PROFILE ADULT - FALL HARM RISK - HARM RISK INTERVENTIONS

## 2023-01-29 NOTE — ED PROVIDER NOTE - ATTENDING CONTRIBUTION TO CARE
Attending Statement (DAJA Harmon MD):    HPI: 78y/o F with no reported medical comorbidities c/o sore throat, pain with swallowing and stating she needs to spit up phlegm frequently over past 1 day. No fever. +cough. No SOB. No chest pain. Denies trauma to face/neck. Patient also reports voice feels different. +smoker (1PPD). denies h/o throat surgery or cancer.    Review of Systems:  -General: no fever   -ENT: see hpi  -Pulmonary: +cough, no shortness of breath  -Cardiac: no chest pain  -Gastrointestinal: no abdominal pain, no nausea, no vomiting, and no diarrhea.  -Skin: no rashes  -Endocrine: No h/o diabetes    On Physical Exam:  General: awake/alert, speaking clearly in full sentences but with slightly muffled voice; cooperative with exam  HEENT: PERRL, MMM; posterior pharynx clear, uvula midline; tonsils midline w/o deviation; tongue not elevated, no swelling/edema to floor of mouth  Neck: no neck tenderness, no nuchal rigidity  Cardiac: normal s1, s2; RRR; no MGR  Lungs: CTABL  Abdomen: soft nontender/nondistended  : no bladder tenderness or distension  Skin: intact, no rash  Extremities: no peripheral edema, no gross deformities  Neuro: no gross neurologic deficits    MDM: 78y/o F with no reported medical comorbidities c/o sore throat, pain with swallowing and stating she needs to spit up phlegm frequently over past 1 day. Slightly muffled voice but no gross stridor and no drooling; seems to be protecting airway, but concern for possible RPA, epiglottitis or mass; will obtain screening labs: cbc (to evaluate for leukocytosis or anemia), CMP (to evaluate for electrolyte abnormalities or renal/liver dysfunction) and then CT soft tissue neck w/ iv contrast to further evaluate. Disposition pending review of labs/imaging. trial decadron 6mg ivp.    ED Course: Patient had some improvement after decadron; patient resting comfortably, speaking clearly, no drooling, still slightly muffled voice but NAD; ct results discussed with patient, ENT consulted and to see patient shortly.

## 2023-01-29 NOTE — H&P ADULT - NSHPLABSRESULTS_GEN_ALL_CORE
I have personally reviewed the imaging: CXR with no consolidation or pleural effusion   I have personally reviewed the ECG: NSR with no acute ST-T changes, Qtc 445

## 2023-01-29 NOTE — ED PROVIDER NOTE - OBJECTIVE STATEMENT
77-year-old female no significant past medical history presenting to the emergency department with sore throat sensation with difficulty swallowing secretions and voice change that began today around 2 PM and has been worsening.  Patient has no known allergies to medications or food.  Patient was not eating any food when symptoms began.  No new medications.  Has not had similar symptoms in the past.  Patient is a daily smoker.  Also endorsing cough x1 week.  Denies fever chest pain, shortness of breath, abdominal pain, nausea, vomiting, dysuria. Sx worse when lying flat.

## 2023-01-29 NOTE — ED ADULT NURSE NOTE - OBJECTIVE STATEMENT
78yo F denies pmh presents to ED ambulatory from home with neighbor present at bedside with c/o "scratchy throat" and cough x1 day. pt reports symptoms began with slight scratchy throat at approx 2pm yesterday then continued to worsen and began having associated prod. cough around 5pm, now experiencing some SOB as well. pt also endorses difficulty swallowing and states she is "choking on saliva". pt denies any known allergies to food or medications, reports she has a history of seeing an allergist d/t occasional breakouts of itchiness and hives but was never found to have allergies. pt also reports she is a daily cigarette smoker, 1 ppd. pt denies any associated chest pain, abd pain, fevers, n/v/d. on exam in ED pt is a&ox4, awake and alert, breathing spontaneous and even with some inspiratory stridor, airway patent with mild throat swelling, speech is clear and coherent but muffled, abd soft nt nd, ambulatory independently with no difficulty. Vital signs stable, sating 98% on RA, 17 breaths per minute. pt seen and eval by ED MD. pt sat upright in bed, placed on CCM and . IV placed to LAC 20g, labs drawn and sent. pending CT scan.

## 2023-01-29 NOTE — H&P ADULT - PROBLEM SELECTOR PLAN 1
Elderly female in NAD, non-septic appearing. Afebrile. Satting in RA, able to speak in full sentences with voice changes. Mild leukocytosis. CT neck soft tissue: supraglottitis w/o airway obstruction, no discrete drainable area.     -S/p Decadron 8mg in the ED   -s/p Ceftriaxone and Vancomycin in ED   -Will continue Decadron 8mg Q8 as per ENT, can send patient on oral steroids if clinically improving   -C/w Ceftriaxone 1g QD, can de-escalate to Augmentin on discharge if BClx are negative   -Will hold off on Vancomycin, f/u with MRSA swab. Patient non-septic appearing with no airway compromise.   -Monitor for respiratory distress, drooling, tongue/face swelling   -F/u with BClx, fever curve  -Clear liquid diet   -F/u with S/S to determine if patient can tolerate PO food, however patient was seen eating a panini in the ED without supervision   -ENT following, f/u outpatient in 1-2 weeks with Dr. Sigala

## 2023-01-29 NOTE — ED PROVIDER NOTE - NS ED ROS FT
Gen: Denies fevers  CV: Denies chest pain  HEENT: +difficulty swallowing  Skin: denies color changes  Resp: +cough  Endo: Denies increased urination  GI: Denies nausea, vomiting  Msk: Denies extremity pain  : Denies dysuria  Neuro: Denies LOC  Psych: Denies mood changes  all other ROS negative unless indicated in HPI

## 2023-01-29 NOTE — H&P ADULT - HISTORY OF PRESENT ILLNESS
77F with no PMHx presents to the ED with sore throat sensation with difficulty swallowing secretions and voice change that began yesterday around 2 PM and has been worsening. Pt also admits to cough attacks and inability to tolerate po.  Patient has no known allergies to medications or food.  Patient was not eating any food when symptoms began.  No new medications.  Has not had similar symptoms in the past. Pt denies dyspnea, chest pain fevers, chills, drooling, swelling of tongue/face, HA, dizziness, syncope, chest palpitations, abd pain, n/v/d, urinary or bowel changes, active sites of bleeding or any other symptoms at this time. States she currently smokes 3 cigarettes/day and has been smoking for 50+ years. 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 Lives alone, can carry out ADLS independently. No recent hospitalization.

## 2023-01-29 NOTE — ED PROVIDER NOTE - PROGRESS NOTE DETAILS
supraglotitis seen on CT scan. ENT notified, they will see patient. Requesting an additional 2mg of decadron. Attending note (Faustino): patient reassessed, some improvement after decadron; patient resting comfortably, speaking clearly, no drooling, still slightly muffled voice but NAD; ct results discussed with patient, ENT consulted and to see patient shortly. Farhan Zepeda MD, PGY1  ENT scoped patient. Recommending CDU v admission for continued steroids and observation of airway. No obvious source of supraglottitis. Will give IV ceftriaxone and vancomycin. Discussed with hospitalist who accepted patient. Discussed with patient who is in agreement with plan. Answered all questions.

## 2023-01-29 NOTE — CONSULT NOTE ADULT - SUBJECTIVE AND OBJECTIVE BOX
CC: noisy breathy, dysphagia     HPI:   77-year-old female no significant past medical history presenting to the emergency department with sore throat sensation with difficulty swallowing secretions and voice change that began today around 2 PM and has been worsening. Pt also admits to cough attacks and inability to tolerate po.  Patient has no known allergies to medications or food.  Patient was not eating any food when symptoms began.  No new medications.  Has not had similar symptoms in the past.  Patient is a daily smoker.  Also endorsing cough x1 week.  Denies fever chest pain, shortness of breath, abdominal pain, nausea, vomiting, dysuria. Sx worse when lying flat.    PAST MEDICAL & SURGICAL HISTORY:  No pertinent past medical history      No significant past surgical history        Allergies    No Known Allergies    Intolerances      MEDICATIONS  (STANDING):    MEDICATIONS  (PRN):      Social History: +tobacco use     Family history: Pt denies any sign FHx    ROS:   ENT: all negative except as noted in HPI   CV: denies palpitations  Pulm: denies SOB, cough, hemoptysis  GI: denies change in apetite, indigestion, n/v  : denies pertinent urinary symptoms, urgency  Neuro: denies numbness/tingling, loss of sensation  Psych: denies anxiety  MS: denies muscle weakness, instability  Heme: denies easy bruising or bleeding  Endo: denies heat/cold intolerance, excessive sweating  Vascular: denies LE edema    Vital Signs Last 24 Hrs  T(C): 36.9 (29 Jan 2023 04:12), Max: 36.9 (29 Jan 2023 04:12)  T(F): 98.4 (29 Jan 2023 04:12), Max: 98.4 (29 Jan 2023 04:12)  HR: 87 (29 Jan 2023 04:12) (87 - 96)  BP: 138/93 (29 Jan 2023 04:12) (135/78 - 143/82)  BP(mean): 105 (29 Jan 2023 04:12) (105 - 105)  RR: 18 (29 Jan 2023 04:12) (17 - 20)  SpO2: 100% (29 Jan 2023 04:12) (98% - 100%)    Parameters below as of 29 Jan 2023 04:12  Patient On (Oxygen Delivery Method): room air                              13.7   11.93 )-----------( 321      ( 29 Jan 2023 01:00 )             42.1    01-29    136  |  100  |  26<H>  ----------------------------<  109<H>  4.2   |  24  |  0.85    Ca    10.0      29 Jan 2023 01:00    TPro  7.6  /  Alb  4.5  /  TBili  0.4  /  DBili  x   /  AST  13  /  ALT  8<L>  /  AlkPhos  66  01-29   PT/INR - ( 29 Jan 2023 01:00 )   PT: 11.5 sec;   INR: 1.00 ratio         PTT - ( 29 Jan 2023 01:00 )  PTT:28.7 sec    PHYSICAL EXAM:  Gen: NAD  Skin: No rashes, bruises, or lesions  Head: Normocephalic, Atraumatic  Face: no edema, erythema, or fluctuance. Parotid glands soft without mass  Eyes: no scleral injection  Nose: Nares bilaterally patent, no discharge  Mouth: No Stridor / Drooling / Trismus.  Mucosa moist, tongue/uvula midline, oropharynx clear  Neck: Flat, supple, no lymphadenopathy, trachea midline, no masses  Lymphatic: No lymphadenopathy  Resp: breathing easily, no stridor  CV: no peripheral edema/cyanosis  GI: nondistended   Peripheral vascular: no JVD or edema  Neuro: facial nerve intact, no facial droop        Fiberoptic Indirect laryngoscopy:  (Scope #2 used)Reason for Laryngoscopy:    Patient was unable to cooperate with mirror.  Nasopharynx, oropharynx, and hypopharynx clear, no bleeding. Tongue base, posterior pharyngeal wall, vallecula, epiglottis, and subglottis appear normal. No erythema, masses or lesions. Airway patent, no foreign body visualized. No glottic edema, however minimal posterior arytenoid and AE fold edema noted with minimal pooling of secretions. True vocal cords, vestibular folds, ventricles, pyriform sinuses appear normal bilaterally. Vocal cords mobile with good contact b/l, however minimal abduction noted.               IMAGING/ADDITIONAL STUDIES: < from: CT Neck Soft Tissue w/ IV Cont (01.29.23 @ 01:51) >    IMPRESSION:    Supraglottitis. No upper airway obstruction. No discrete focal drainable   collection.  Prominent lingual tonsils.  Nonspecific thickening of the proximal esophagus is partially visualized.    Findings and recommendations were discussed with Dr. Harmon of the ED at   2:35 AM on 1/29/2023    --- End of Report ---    < end of copied text >

## 2023-01-29 NOTE — PATIENT PROFILE ADULT - SAFE PLACE TO LIVE
[facility] ambulatory encounter  ORTHOPEDIC OFFICE VISIT    CHIEF COMPLAINT:  Shoulder (R shoulder injury)      SUBJECTIVE:  Calvin Kim is a 46 year old male who presented requesting evaluation for a right shoulder injury.  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  He was diagnosed with AC separation. Pain over collarbone. Denies pain elsewhere. He is in a shoulder immobilizer. Pain with activity relived with rest.     PROBLEM LIST:  Patient Active Problem List   Diagnosis   • Lateral epicondylitis of right elbow   • Carpal tunnel syndrome of right wrist       HISTORIES:  I have reviewed the past medical history, family history, social history, medications and allergies listed in the medical record as obtained by my nursing staff and support staff and agree with their documentation.    Review of systems:  12 point obtained    OBJECTIVE:  Physical ExamINATION:  Vitals:   Visit Vitals  Pulse 68   Ht 6' 3\" (1.905 m)   Wt 90.6 kg   BMI 24.97 kg/m²     Constitutional:  Well-developed, well-nourished male in no acute distress.  Skin: Warm, dry, intact without rash or lesion.  Neurologic:  Alert and oriented x3.  Musculoskeletal:  Deformity to R AC joint. Exam deferred due to pain. R hand is NVI.    LAB RESULTS:  No laboratory results for this encounter were reviewed.    IMAGING STUDIES:  Images taken in the clinic today of the right shoulder were reviewed. These demonstrate the right collarbone is elevated.  This is a type 5 AC separation.    ASSESSMENT:  1. Injury of right shoulder, initial encounter        PLAN:  Surgical options discussed with the patient.    Return in 2 weeks for a follow up visit to see if he is feeling better. If not, will consider surgery.   Physical Therapy ordered.      Orders Placed This Encounter   • XR Shoulder 2 View Bilateral   • XR Shoulder 2 View Right       No follow-ups on file.    Instructions provided as documented in the after visit  summary.    The patient indicated understanding of the diagnosis and agreed with the plan of care.    IJoellen, documented this patient chart on 08/28/19  as the medical scribe on behalf of Dr. Neri.    Agree with above     no

## 2023-01-29 NOTE — ED PROVIDER NOTE - CLINICAL SUMMARY MEDICAL DECISION MAKING FREE TEXT BOX
77-year-old female no significant past medical history presenting to the emergency department with sore throat sensation with difficulty swallowing secretions and voice change that began today around 2 PM and has been worsening.   No known allergies.  vital signs stable, saturating 100%. Has audible inspiratory stridor with muffled voice. Lungs clear. oropharynx patent. No uvular/tongue swelling. No rash. No GI pain. Not concerning for anaphylaxis at this time.   Concern for foreign body v mass given chronic daily smoking history v pharyngitis v PTA/epiglotitis.   Plan: labs, EKG, CT neck, xray chest, will give dexamethasone, benadryl, famotidine.  Will reassess.

## 2023-01-29 NOTE — CONSULT NOTE ADULT - ASSESSMENT
77y w inspiratory stridor found to have supraglottic edema on CT. indirect laryngoscopy reveals minimal posterior arytenoid and AE fold edema as well as minimal VC abduction. Pt stable breathing and satting well. Pt states she is able to tolerate small amounts of po liquids

## 2023-01-29 NOTE — ED PROVIDER NOTE - PHYSICAL EXAMINATION
Gen: Muffled voice, sitting in upright position, audible inspiratory stridor.   HEENT: EOMI, no nasal discharge, mucous membranes moist. oropharynx patent. No tongue or uvular swelling. Mild pharyngeal erythema.   CV: RRR, +S1/S2, no M/R/G, 2+ radial pulses b/l  Resp: CTAB, no W/R/R  GI: Abdomen soft non-distended, NTTP  MSK: No open wounds, no bruising, no LE edema  Skin: no rash  Neuro: A&Ox4, following commands, moving all four extremities spontaneously  Psych: appropriate mood

## 2023-01-29 NOTE — H&P ADULT - NSHPPHYSICALEXAM_GEN_ALL_CORE
Vital Signs Last 24 Hrs  T(C): 36.8 (29 Jan 2023 10:00), Max: 36.9 (29 Jan 2023 04:12)  T(F): 98.2 (29 Jan 2023 10:00), Max: 98.5 (29 Jan 2023 07:34)  HR: 105 (29 Jan 2023 10:00) (87 - 105)  BP: 114/73 (29 Jan 2023 10:00) (114/73 - 143/82)  BP(mean): 105 (29 Jan 2023 04:12) (105 - 105)  RR: 20 (29 Jan 2023 10:00) (17 - 20)  SpO2: 97% (29 Jan 2023 10:00) (97% - 100%)    Parameters below as of 29 Jan 2023 07:34  Patient On (Oxygen Delivery Method): room air        CONSTITUTIONAL: Well-groomed, in no apparent distress  EYES: No conjunctival or scleral injection, non-icteric; PERRLA and symmetric  ENMT: oral mucosa with moist membranes  NECK: Trachea midline without palpable neck mass  RESPIRATORY: Breathing comfortably; no dullness to percussion; lungs CTA without wheeze/rhonchi/rales  CARDIOVASCULAR: +S1S2, RRR, no M/G/R; no carotid bruits; pedal pulses full and symmetric; no lower extremity edema  GASTROINTESTINAL: No palpable masses or tenderness, +BS throughout, no rebound/guarding; no hepatosplenomegaly; no hernia palpated  MUSCULOSKELETAL: Normal gait and station;  normal strength and tone of extremities  SKIN: No rashes or ulcers noted; no subcutaneous nodules or induration palpable  NEUROLOGIC: CN II-XII intact; normal reflexes in upper and lower extremities; sensation intact in LEs b/l to light touch  PSYCHIATRIC: A+O x 3; mood and affect appropriate; appropriate insight and judgment

## 2023-01-29 NOTE — CONSULT NOTE ADULT - PROBLEM SELECTOR RECOMMENDATION 9
Pt discussed in detail with Dr. Paul, plan to   - continue decadron 8mg q8hrs   - continue to monitor airway   - lynette CDU for obs   - pt should be d/c home on medrol dose pack   Pt is to follow up at [facility] ENT clinic in 2 weeks. Call [phone] to make appointment. Pt discussed in detail with Dr. Paul, plan to   - continue decadron 8mg q8hrs   - continue to monitor airway   - lynette CDU for obs   Pt is to follow up at [facility] ENT clinic in 2 weeks. Call [phone] to make appointment.

## 2023-01-29 NOTE — ED ADULT NURSE NOTE - HAVE YOU RECEIVED AT LEAST TWO PFIZER AND/OR MODERNA VACCINATIONS (IN ANY COMBINATION) AND/OR ONE JOHNSON & JOHNSON VACCINATION?
Continued Stay Note  [facility]     Patient Name: Mary Chavez  MRN: [id number]  Today's Date: 4/13/2021    Admit Date: 4/11/2021    Discharge Plan     Row Name 04/13/21 1453       Plan    Plan  [facility] Skilled rehab    Plan Comments  See Ch with APS here to do assessment/investigation. Informed her of DC plan to go to [facility] for rehab. Recommendation is for spouse not to transport patient at time of DC. Stated she will continue to follow. Requested to be notified at time of DC. Her work # is [phone].    Row Name 04/13/21 1247       Plan    Plan  [facility] Skilled rehab    Plan Comments  Per Kathy with [facility], [facility] has accepted. Spoke with patient who is in agreement to DC plan. Stated spouse will provide transportation at [place]. Requested MD to speak with spouse, Marc Chavez at [phone]). Informed RN who stated she was aware.        Discharge Codes    No documentation.             Areli Montes De Oca, RN     Yes

## 2023-01-29 NOTE — H&P ADULT - NSHPREVIEWOFSYSTEMS_GEN_ALL_CORE
REVIEW OF SYSTEMS:  CONSTITUTIONAL: No weakness, fevers or chills  EYES/ENT:+ throat pain +voice change, no drooling, no tongue swelling, no visual changes   NECK: No pain or stiffness  RESPIRATORY: +cough, no wheezing, no hemoptysis; No shortness of breath  CARDIOVASCULAR: No chest pain or palpitations  GASTROINTESTINAL: No abdominal or epigastric pain. No nausea, vomiting, or hematemesis; No diarrhea or constipation.   GENITOURINARY: No dysuria, frequency or hematuria  NEUROLOGICAL: No numbness or weakness  SKIN: No itching, rashes

## 2023-01-30 ENCOUNTER — TRANSCRIPTION ENCOUNTER (OUTPATIENT)
Age: 78
End: 2023-01-30

## 2023-01-30 VITALS
DIASTOLIC BLOOD PRESSURE: 71 MMHG | OXYGEN SATURATION: 97 % | RESPIRATION RATE: 18 BRPM | HEART RATE: 79 BPM | SYSTOLIC BLOOD PRESSURE: 120 MMHG | TEMPERATURE: 98 F

## 2023-01-30 LAB
A1C WITH ESTIMATED AVERAGE GLUCOSE RESULT: 5.8 % — HIGH (ref 4–5.6)
ANION GAP SERPL CALC-SCNC: 11 MMOL/L — SIGNIFICANT CHANGE UP (ref 5–17)
BUN SERPL-MCNC: 20 MG/DL — SIGNIFICANT CHANGE UP (ref 7–23)
CALCIUM SERPL-MCNC: 9.8 MG/DL — SIGNIFICANT CHANGE UP (ref 8.4–10.5)
CHLORIDE SERPL-SCNC: 101 MMOL/L — SIGNIFICANT CHANGE UP (ref 96–108)
CHOLEST SERPL-MCNC: 202 MG/DL — HIGH
CO2 SERPL-SCNC: 24 MMOL/L — SIGNIFICANT CHANGE UP (ref 22–31)
CREAT SERPL-MCNC: 0.69 MG/DL — SIGNIFICANT CHANGE UP (ref 0.5–1.3)
EGFR: 90 ML/MIN/1.73M2 — SIGNIFICANT CHANGE UP
ESTIMATED AVERAGE GLUCOSE: 120 MG/DL — HIGH (ref 68–114)
GLUCOSE SERPL-MCNC: 124 MG/DL — HIGH (ref 70–99)
HCT VFR BLD CALC: 36.7 % — SIGNIFICANT CHANGE UP (ref 34.5–45)
HDLC SERPL-MCNC: 54 MG/DL — SIGNIFICANT CHANGE UP
HGB BLD-MCNC: 12.1 G/DL — SIGNIFICANT CHANGE UP (ref 11.5–15.5)
LIPID PNL WITH DIRECT LDL SERPL: 129 MG/DL — HIGH
MCHC RBC-ENTMCNC: 29.2 PG — SIGNIFICANT CHANGE UP (ref 27–34)
MCHC RBC-ENTMCNC: 33 GM/DL — SIGNIFICANT CHANGE UP (ref 32–36)
MCV RBC AUTO: 88.6 FL — SIGNIFICANT CHANGE UP (ref 80–100)
MRSA PCR RESULT.: SIGNIFICANT CHANGE UP
MRSA PCR RESULT.: SIGNIFICANT CHANGE UP
NON HDL CHOLESTEROL: 148 MG/DL — HIGH
NRBC # BLD: 0 /100 WBCS — SIGNIFICANT CHANGE UP (ref 0–0)
PLATELET # BLD AUTO: 279 K/UL — SIGNIFICANT CHANGE UP (ref 150–400)
POTASSIUM SERPL-MCNC: 4.2 MMOL/L — SIGNIFICANT CHANGE UP (ref 3.5–5.3)
POTASSIUM SERPL-SCNC: 4.2 MMOL/L — SIGNIFICANT CHANGE UP (ref 3.5–5.3)
RBC # BLD: 4.14 M/UL — SIGNIFICANT CHANGE UP (ref 3.8–5.2)
RBC # FLD: 13.9 % — SIGNIFICANT CHANGE UP (ref 10.3–14.5)
S AUREUS DNA NOSE QL NAA+PROBE: SIGNIFICANT CHANGE UP
S AUREUS DNA NOSE QL NAA+PROBE: SIGNIFICANT CHANGE UP
SODIUM SERPL-SCNC: 136 MMOL/L — SIGNIFICANT CHANGE UP (ref 135–145)
TRIGL SERPL-MCNC: 93 MG/DL — SIGNIFICANT CHANGE UP
WBC # BLD: 5.71 K/UL — SIGNIFICANT CHANGE UP (ref 3.8–10.5)
WBC # FLD AUTO: 5.71 K/UL — SIGNIFICANT CHANGE UP (ref 3.8–10.5)

## 2023-01-30 PROCEDURE — 85027 COMPLETE CBC AUTOMATED: CPT

## 2023-01-30 PROCEDURE — 70491 CT SOFT TISSUE NECK W/DYE: CPT | Mod: MA

## 2023-01-30 PROCEDURE — 96374 THER/PROPH/DIAG INJ IV PUSH: CPT

## 2023-01-30 PROCEDURE — 92612 ENDOSCOPY SWALLOW (FEES) VID: CPT

## 2023-01-30 PROCEDURE — 86901 BLOOD TYPING SEROLOGIC RH(D): CPT

## 2023-01-30 PROCEDURE — 87640 STAPH A DNA AMP PROBE: CPT

## 2023-01-30 PROCEDURE — 71045 X-RAY EXAM CHEST 1 VIEW: CPT

## 2023-01-30 PROCEDURE — 80048 BASIC METABOLIC PNL TOTAL CA: CPT

## 2023-01-30 PROCEDURE — 85730 THROMBOPLASTIN TIME PARTIAL: CPT

## 2023-01-30 PROCEDURE — 0225U NFCT DS DNA&RNA 21 SARSCOV2: CPT

## 2023-01-30 PROCEDURE — 99239 HOSP IP/OBS DSCHRG MGMT >30: CPT

## 2023-01-30 PROCEDURE — 80053 COMPREHEN METABOLIC PANEL: CPT

## 2023-01-30 PROCEDURE — 85025 COMPLETE CBC W/AUTO DIFF WBC: CPT

## 2023-01-30 PROCEDURE — 83036 HEMOGLOBIN GLYCOSYLATED A1C: CPT

## 2023-01-30 PROCEDURE — 87040 BLOOD CULTURE FOR BACTERIA: CPT

## 2023-01-30 PROCEDURE — 36415 COLL VENOUS BLD VENIPUNCTURE: CPT

## 2023-01-30 PROCEDURE — 85610 PROTHROMBIN TIME: CPT

## 2023-01-30 PROCEDURE — 99285 EMERGENCY DEPT VISIT HI MDM: CPT | Mod: 25

## 2023-01-30 PROCEDURE — 86900 BLOOD TYPING SEROLOGIC ABO: CPT

## 2023-01-30 PROCEDURE — 96376 TX/PRO/DX INJ SAME DRUG ADON: CPT

## 2023-01-30 PROCEDURE — 80061 LIPID PANEL: CPT

## 2023-01-30 PROCEDURE — 86850 RBC ANTIBODY SCREEN: CPT

## 2023-01-30 PROCEDURE — 92610 EVALUATE SWALLOWING FUNCTION: CPT

## 2023-01-30 PROCEDURE — 87641 MR-STAPH DNA AMP PROBE: CPT

## 2023-01-30 PROCEDURE — 96375 TX/PRO/DX INJ NEW DRUG ADDON: CPT

## 2023-01-30 RX ORDER — CEFTRIAXONE 500 MG/1
1000 INJECTION, POWDER, FOR SOLUTION INTRAMUSCULAR; INTRAVENOUS EVERY 24 HOURS
Refills: 0 | Status: DISCONTINUED | OUTPATIENT
Start: 2023-01-30 | End: 2023-01-30

## 2023-01-30 RX ORDER — CHLORHEXIDINE GLUCONATE 213 G/1000ML
1 SOLUTION TOPICAL
Refills: 0 | Status: DISCONTINUED | OUTPATIENT
Start: 2023-01-30 | End: 2023-01-30

## 2023-01-30 RX ORDER — NICOTINE POLACRILEX 2 MG
1 GUM BUCCAL
Qty: 1 | Refills: 0
Start: 2023-01-30

## 2023-01-30 RX ADMIN — Medication 1 PATCH: at 07:09

## 2023-01-30 RX ADMIN — CEFTRIAXONE 100 MILLIGRAM(S): 500 INJECTION, POWDER, FOR SOLUTION INTRAMUSCULAR; INTRAVENOUS at 09:12

## 2023-01-30 RX ADMIN — Medication 1 PATCH: at 11:40

## 2023-01-30 RX ADMIN — Medication 101.6 MILLIGRAM(S): at 05:40

## 2023-01-30 RX ADMIN — Medication 101.6 MILLIGRAM(S): at 13:43

## 2023-01-30 RX ADMIN — Medication 1 PATCH: at 11:48

## 2023-01-30 NOTE — SWALLOW BEDSIDE ASSESSMENT ADULT - ADDITIONAL RECOMMENDATIONS
Maintain good oral hygiene    LTG: Pt will tolerate the least restrictive diet without overt signs or symptoms of penetration or aspiration

## 2023-01-30 NOTE — DISCHARGE NOTE PROVIDER - NSDCCPCAREPLAN_GEN_ALL_CORE_FT
PRINCIPAL DISCHARGE DIAGNOSIS  Diagnosis: Supraglottitis  Assessment and Plan of Treatment: You presented with sore throat, difficulty swallowing secretions, voice change, and cough.   Found to have supraglottic edema on CT.   ENT consulted - Indirect laryngoscopy reveals minimal posterior arytenoid and AE fold edema as well as minimal VC abduction.   You were stable breathing and oxygen level normal.   Placed on decadron - steroids with improvement.  Seen by Speech and Swallow, You had FEEST study - rec Soft and bite sized solids with Thin liquids. Tolerated diet  ENT cleared patient to d/c home with medrol dose pack   You were given IV antibiotics Ceftriaxone in the hospital - transitioned to PO Augementin for 5 more days  Follow up at [facility] ENT clinic in 2 weeks. Call [phone] to make appointment.  Return to ER or call [phone] for any worseing of symptoms, difficulty breathing/choking feelin       PRINCIPAL DISCHARGE DIAGNOSIS  Diagnosis: Supraglottitis  Assessment and Plan of Treatment: You presented with sore throat, difficulty swallowing secretions, voice change, and cough.   Found to have supraglottic edema on CT.   ENT consulted - Indirect laryngoscopy reveals minimal posterior arytenoid and AE fold edema as well as minimal VC abduction.   You were stable breathing and oxygen level normal.   Placed on decadron - steroids with improvement.  Seen by Speech and Swallow, You had FEEST study - rec Soft and bite sized solids with Thin liquids. Tolerated diet  ENT cleared patient to d/c home with medrol dose pack   You were given IV antibiotics Ceftriaxone in the hospital - transitioned to PO Augementin for 5 more days  Follow up at [facility] ENT clinic in 2 weeks. Call [phone] to make appointment.  Return to ER or call [phone] for any worseing of symptoms, difficulty breathing/choking feelin      SECONDARY DISCHARGE DIAGNOSES  Diagnosis: Current every day smoker  Assessment and Plan of Treatment: Quit Smoking  Take Nicotine patch as directed  - 21mg/24hrs  patch x 6 weeks THEN 14mg/24Hrs for 2 weeks then 7mg/24hrs x 2 weeks  STOP CIGARETTE USE at treatment onset

## 2023-01-30 NOTE — SWALLOW FEES ASSESSMENT ADULT - COMMENTS
1/29: CXR - clear lungs  1/29: CT Neck - The mucosal surfaces of the upper aerodigestive tract demonstrate modest circumferential thickening throughout the upper esophagus of unclear etiology.  The larynx is intact. The epiglottis is not thickened. There is thickening of the bilateral aryepiglottic fold compatible with supraglottitis. Laryngeal cartilages remain intact. The nasopharynx is symmetric.  No lateral retropharyngeal mass is found.    The underlying central skull base is intact.  The petrous temporal bones and mastoids remain clear.  The visualized base of brain appears   unremarkable. The parotid and submandibular glands are intact.  The thyroid gland is intact. The lingular tonsils are prominent.    1/30: ENT - 77y w inspiratory stridor found to have supraglottic edema on CT improving with steroids.   Problem: Supraglottic edema.  Plan: Pt discussed in detail with Dr. Paul, plan to trial regular diet with pt d/c home with medrol dose pack   Pt is to follow up at [facility] ENT clinic in 2 weeks.   Per H&P: F/u with S/S to determine if patient can tolerate PO food, however patient was seen eating a panini in the ED without supervision

## 2023-01-30 NOTE — DISCHARGE NOTE PROVIDER - PROVIDER TOKENS
FREE:[LAST:[ENT Clinic],PHONE:[(   )    -],FAX:[(   )    -],ADDRESS:[Follow up at [facility] ENT clinic in 2 weeks.   Call [phone] to make appointment.],FOLLOWUP:[2 weeks]]

## 2023-01-30 NOTE — DISCHARGE NOTE PROVIDER - NSDCFUADDAPPT_GEN_ALL_CORE_FT
APPTS ARE READY TO BE MADE: [ x] YES    Best Family or Patient Contact (if needed):    Additional Information about above appointments (if needed):    1: Follow up at [facility] ENT clinic in 2 weeks. Call [phone] to make appointment.  2:   3:     Other comments or requests:    APPTS ARE READY TO BE MADE: [ x] YES    Best Family or Patient Contact (if needed):    Additional Information about above appointments (if needed):    1: Follow up at [facility] ENT clinic in 2 weeks. Call [phone] to make appointment.  2:   3:     Other comments or requests:   Patient was provided with follow up request details for the [facility] ENT clinic and was advised to call to schedule follow up within specified time frame.

## 2023-01-30 NOTE — DISCHARGE NOTE PROVIDER - NSDCADMDATE_GEN_ALL_CORE_FT
Chief Concern/Diagnosis: Follow up 17 L carpal tunnel release    Prior Surgeries:    1. 17 L carpal tunnel release  2. 17 R carpal tunnel release    Subjective:  Doing very well at this point. Minimal pain. Sensation to his fingers has improved since surgery. Very happy with his outcome. No concerns at present. Reports full wrist range of motion. Not using any splinting.    Physical Examination:  QUICKDASH: -2.27    Gen:  Alert, no acute distress, well nourished, appears stated age    Psych:  Normal affect, nonpressured speech    Musculoskeletal:    LUE       Incision well healed, c/d/i      SILT R/U/M       Motor:  Fires EPL, FPL, abductors       Perfusion:  Warm and well perfused in distal digits, palpable radial pulse    Wrist range of motion: Flexion 90, extension 80, full pronosupination    Assessment:   60 year old male with the followin. 6 weeks status post left carpal tunnel release. Doing very well at this point. Recovering as expected.    Recommendations:  1. Follow-up p.r.n.     Seen with Dr. Lowe, documentation by:    Rambo Live MD  Orthopaedic Surgery PGY-4   I have independently seen and evaluated the patient and agree with the findings and plan of care as documented by the resident and edited by me.     Answers for HPI/ROS submitted by the patient on 2018   General Symptoms: No  Skin Symptoms: No  HENT Symptoms: No  EYE SYMPTOMS: No  HEART SYMPTOMS: No  LUNG SYMPTOMS: No  INTESTINAL SYMPTOMS: No  URINARY SYMPTOMS: Yes  REPRODUCTIVE SYMPTOMS: No  SKELETAL SYMPTOMS: No  BLOOD SYMPTOMS: No  NERVOUS SYSTEM SYMPTOMS: No  MENTAL HEALTH SYMPTOMS: No  Trouble holding urine or incontinence: No  Pain or burning: No  Trouble starting or stopping: No  Increased frequency of urination: No  Blood in urine: No  Decreased frequency of urination: No  Frequent nighttime urination: No  Flank pain: No  Difficulty emptying bladder: Yes     29-Jan-2023 06:44

## 2023-01-30 NOTE — SWALLOW BEDSIDE ASSESSMENT ADULT - SWALLOW EVAL: DIAGNOSIS
77-year-old female no significant past medical history found to have supraglottic edema. Patient has no known allergies to medications or food.  Patient was not eating any food when symptoms began.  No new medications.  Has not had similar symptoms in the past.  Patient is a daily smoker.  Also endorsing cough x1 week.  Denies fever chest pain, shortness of breath, abdominal pain, nausea, vomiting, dysuria. PT states feeling much improved. wants to trial po regular diet 77-year-old female no significant past medical history found to have supraglottic edema. Patient has no known allergies to medications or food.  Patient was not eating any food when symptoms began.  No new medications.  Has not had similar symptoms in the past.  Patient is a daily smoker.  Also endorsing cough x1 week.  Denies fever chest pain, shortness of breath, abdominal pain, nausea, vomiting, dysuria. PT states feeling much improved. wants to trial po regular diet. Pt presents with a concern for a pharyngeal phase dysphagia. Swallow sequence is marked by multiple swallows (2-3) with purees, followed by a delayed throat clear which may be suggestive of delayed pharyngeal clearance. No overt signs of symptoms of penetration or aspiration on cups sips of thin liquids.

## 2023-01-30 NOTE — SWALLOW FEES ASSESSMENT ADULT - PHARYNGEAL PHASE COMMENTS
+trace-mild residue upon rim of epiglottis +mild residue upon arytenoids and interarytenoid space mild residue in the lateral channels

## 2023-01-30 NOTE — SWALLOW BEDSIDE ASSESSMENT ADULT - COMMENTS
1/29: CXR - clear lungs  1/29: CT Neck - The mucosal surfaces of the upper aerodigestive tract demonstrate modest circumferential thickening throughout the upper esophagus of unclear etiology.  The larynx is intact. The epiglottis is not thickened. There is thickening of the bilateral aryepiglottic fold compatible with supraglottitis. Laryngeal cartilages remain intact. The nasopharynx is symmetric.  No lateral retropharyngeal mass is found.    The underlying central skull base is intact.  The petrous temporal bones and mastoids remain clear.  The visualized base of brain appears   unremarkable. The parotid and submandibular glands are intact.  The thyroid gland is intact. The lingular tonsils are prominent.    1/30: ENT - 77y w inspiratory stridor found to have supraglottic edema on CT improving with steroids.   ·  Problem: Supraglottic edema.  Plan: Pt discussed in detail with Dr. Paul, plan to trial regular diet with pt d/c home with medrol dose pack   Pt is to follow up at [facility] ENT clinic in 2 weeks. Call [phone] to make appointment. 1/29: CXR - clear lungs  1/29: CT Neck - The mucosal surfaces of the upper aerodigestive tract demonstrate modest circumferential thickening throughout the upper esophagus of unclear etiology.  The larynx is intact. The epiglottis is not thickened. There is thickening of the bilateral aryepiglottic fold compatible with supraglottitis. Laryngeal cartilages remain intact. The nasopharynx is symmetric.  No lateral retropharyngeal mass is found.    The underlying central skull base is intact.  The petrous temporal bones and mastoids remain clear.  The visualized base of brain appears   unremarkable. The parotid and submandibular glands are intact.  The thyroid gland is intact. The lingular tonsils are prominent.    1/30: ENT - 77y w inspiratory stridor found to have supraglottic edema on CT improving with steroids.   ·  Problem: Supraglottic edema.  Plan: Pt discussed in detail with Dr. Paul, plan to trial regular diet with pt d/c home with medrol dose pack   Pt is to follow up at [facility] ENT clinic in 2 weeks.   Per H&P: F/u with S/S to determine if patient can tolerate PO food, however patient was seen eating a panini in the ED without supervision

## 2023-01-30 NOTE — DISCHARGE NOTE NURSING/CASE MANAGEMENT/SOCIAL WORK - PATIENT PORTAL LINK FT
You can access the FollowMyHealth Patient Portal offered by [facility] by registering at the following website: [email or web address]. By joining [facility] [employer]’[facility] FollowMyHealth portal, you will also be able to view your health information using other applications (apps) compatible with our system.

## 2023-01-30 NOTE — SWALLOW BEDSIDE ASSESSMENT ADULT - SLP GENERAL OBSERVATIONS
Pt encountered bedside, AA&Ox4, pleasant and cooperative. +Mildly hoarse vocal quality, pt reports this is improving from admission. Pt follow simple directives and answers yes/no questions. Pt states that a few days ago she was having trouble swallowing her own saliva which prompted this hospitalization. She is unclear if this is an allergic reaction to a stimulant (i.e. food). She reports that she is now able to swallow without difficulty and denies odynophagia or globus sensation.

## 2023-01-30 NOTE — PROGRESS NOTE ADULT - ASSESSMENT
77y w inspiratory stridor found to have supraglottic edema on CT improving with steroids    
77F with no PMHx presents to the ED admitted for supraglottitis, with no airway compromise. Reports voice change.

## 2023-01-30 NOTE — DISCHARGE NOTE PROVIDER - HOSPITAL COURSE
77F current daily smoker with no significant PMHx present to ED with inspiratory stridor c/o sore throat, difficulty swallowing secretions, voice change, and cough.   Found to have supraglottic edema on CT.   Patient has no known allergies to medications or food. No new medications. Patient is a daily smoker.  Also endorsing cough x1 week.   ENT consulted - Indirect laryngoscopy reveals minimal posterior arytenoid and AE fold edema as well as minimal VC abduction. Pt stable breathing and satting well. Pt states she is able to tolerate small amounts of po liquids  indirect laryngoscopy reveals minimal posterior arytenoid and AE fold edema as well as minimal VC abduction. Pt stable breathing and satting well. Pt states she is able to tolerate small amounts of po liquids  Placed on decadron 8mg q8hrs with improvement.  Seen by Speech and Swallow, s/p FEEST study - rec Soft and bite sized solids with Thin liquids. Tolerated diet  ENT cleared patient to d/c home with medrol dose pack   Pt is to follow up at [facility] ENT clinic in 2 weeks. Call [phone] to make appointment.    Medically cleared by Dr. Vela to discharge pt home today on medrol dose pack and Augementin to complete 7 day course

## 2023-01-30 NOTE — DISCHARGE NOTE PROVIDER - CARE PROVIDER_API CALL
ENT Clinic,   Follow up at [facility] ENT clinic in 2 weeks.   Call [phone] to make appointment.  Phone: (   )    -  Fax: (   )    -  Follow Up Time: 2 weeks

## 2023-01-30 NOTE — DISCHARGE NOTE NURSING/CASE MANAGEMENT/SOCIAL WORK - NSDCPEFALRISK_GEN_ALL_CORE
For information on Fall & Injury Prevention, visit: [email or web address] OR  [email or web address] OR  https://www.cdc.gov/steadi/patient.html

## 2023-01-30 NOTE — PROGRESS NOTE ADULT - SUBJECTIVE AND OBJECTIVE BOX
CC: noisy breathy, dysphagia     HPI:   77-year-old female no significant past medical history found to have supraglottic edema. Patient has no known allergies to medications or food.  Patient was not eating any food when symptoms began.  No new medications.  Has not had similar symptoms in the past.  Patient is a daily smoker.  Also endorsing cough x1 week.  Denies fever chest pain, shortness of breath, abdominal pain, nausea, vomiting, dysuria. PT states feeling much improved. wants to trial po regular diet     PAST MEDICAL & SURGICAL HISTORY:  No pertinent past medical history      No significant past surgical history        Allergies    No Known Allergies    Intolerances      MEDICATIONS  (STANDING):  cefTRIAXone   IVPB 1 milliGRAM(s) IV Intermittent every 24 hours  dexAMETHasone  IVPB 8 milliGRAM(s) IV Intermittent every 8 hours  nicotine -   7 mG/24Hr(s) Patch 1 Patch Transdermal daily    MEDICATIONS  (PRN):  acetaminophen     Tablet .. 650 milliGRAM(s) Oral every 6 hours PRN Temp greater or equal to 38C (100.4F), Mild Pain (1 - 3)  aluminum hydroxide/magnesium hydroxide/simethicone Suspension 30 milliLiter(s) Oral every 4 hours PRN Dyspepsia  melatonin 3 milliGRAM(s) Oral at bedtime PRN Insomnia  ondansetron Injectable 4 milliGRAM(s) IV Push every 8 hours PRN Nausea and/or Vomiting      social history: see consult     family history: see consult     ROS:   ENT: all negative except as noted in HPI   Pulm: denies SOB, cough, hemoptysis  Neuro: denies numbness/tingling, loss of sensation  Endo: denies heat/cold intolerance, excessive sweating      Vital Signs Last 24 Hrs  T(C): 36.8 (30 Jan 2023 04:20), Max: 36.8 (29 Jan 2023 10:00)  T(F): 98.3 (30 Jan 2023 04:20), Max: 98.3 (30 Jan 2023 04:20)  HR: 75 (30 Jan 2023 04:20) (74 - 105)  BP: 129/71 (30 Jan 2023 04:20) (111/68 - 129/71)  BP(mean): --  RR: 18 (30 Jan 2023 04:20) (16 - 20)  SpO2: 96% (30 Jan 2023 04:20) (96% - 98%)    Parameters below as of 30 Jan 2023 04:20  Patient On (Oxygen Delivery Method): room air                              12.1   5.71  )-----------( 279      ( 30 Jan 2023 06:33 )             36.7    01-30    136  |  101  |  20  ----------------------------<  124<H>  4.2   |  24  |  0.69    Ca    9.8      30 Jan 2023 06:33    TPro  7.6  /  Alb  4.5  /  TBili  0.4  /  DBili  x   /  AST  13  /  ALT  8<L>  /  AlkPhos  66  01-29   PT/INR - ( 29 Jan 2023 01:00 )   PT: 11.5 sec;   INR: 1.00 ratio         PTT - ( 29 Jan 2023 01:00 )  PTT:28.7 sec    PHYSICAL EXAM:  Gen: NAD  Skin: No rashes, bruises, or lesions  Head: Normocephalic, Atraumatic  Face: no edema, erythema, or fluctuance. Parotid glands soft without mass  Eyes: no scleral injection  Nose: Nares bilaterally patent, no discharge  Mouth: No Stridor / Drooling / Trismus.  Mucosa moist, tongue/uvula midline, oropharynx clear  Neck: Flat, supple, no lymphadenopathy, trachea midline, no masses  Lymphatic: No lymphadenopathy  Resp: breathing easily, no stridor  Neuro: facial nerve intact, no facial droop        
[facility] Division of Hospital Medicine  James Vela MD  Available via MS Teams    SUBJECTIVE / OVERNIGHT EVENTS:  no acute events overnight  hoarseness is much improved, pain on swallowing also improved, although has not tried regular diet yet  no n/v, no drooling, no SOB, no CP    ADDITIONAL REVIEW OF SYSTEMS:  14 point ROS negative except as noted above     MEDICATIONS  (STANDING):  cefTRIAXone   IVPB 1000 milliGRAM(s) IV Intermittent every 24 hours  chlorhexidine 2% Cloths 1 Application(s) Topical <User Schedule>  dexAMETHasone  IVPB 8 milliGRAM(s) IV Intermittent every 8 hours  nicotine -   7 mG/24Hr(s) Patch 1 Patch Transdermal daily    MEDICATIONS  (PRN):  acetaminophen     Tablet .. 650 milliGRAM(s) Oral every 6 hours PRN Temp greater or equal to 38C (100.4F), Mild Pain (1 - 3)  aluminum hydroxide/magnesium hydroxide/simethicone Suspension 30 milliLiter(s) Oral every 4 hours PRN Dyspepsia  melatonin 3 milliGRAM(s) Oral at bedtime PRN Insomnia  ondansetron Injectable 4 milliGRAM(s) IV Push every 8 hours PRN Nausea and/or Vomiting      I&O's Summary    30 Jan 2023 07:01  -  30 Jan 2023 13:09  --------------------------------------------------------  IN: 480 mL / OUT: 0 mL / NET: 480 mL        PHYSICAL EXAM:  Vital Signs Last 24 Hrs  T(C): 36.6 (30 Jan 2023 11:26), Max: 36.8 (29 Jan 2023 20:30)  T(F): 97.8 (30 Jan 2023 11:26), Max: 98.3 (30 Jan 2023 04:20)  HR: 79 (30 Jan 2023 11:26) (74 - 79)  BP: 120/71 (30 Jan 2023 11:26) (111/68 - 129/71)  BP(mean): --  RR: 18 (30 Jan 2023 11:26) (16 - 18)  SpO2: 97% (30 Jan 2023 11:26) (96% - 98%)    Parameters below as of 30 Jan 2023 11:26  Patient On (Oxygen Delivery Method): room air      PHYSICAL EXAM:  GENERAL: NAD, well-developed  HEAD:  Atraumatic, normocephalic  EYES: EOMI, conjunctiva and sclera clear  NECK: Supple, no JVD  CHEST/LUNG: Clear to auscultation bilaterally; no wheezing or rales  HEART: Regular rate and rhythm; no murmurs, no LE edema   ABDOMEN: Soft, nontender, nondistended; bowel sounds present  EXTREMITIES:  2+ Peripheral Pulses, no clubbing, cyanosis  PSYCH: AAOx3, calm affect, not anxious  SKIN: No rashes or lesions      LABS:                        12.1   5.71  )-----------( 279      ( 30 Jan 2023 06:33 )             36.7     01-30    136  |  101  |  20  ----------------------------<  124<H>  4.2   |  24  |  0.69    Ca    9.8      30 Jan 2023 06:33    TPro  7.6  /  Alb  4.5  /  TBili  0.4  /  DBili  x   /  AST  13  /  ALT  8<L>  /  AlkPhos  66  01-29    PT/INR - ( 29 Jan 2023 01:00 )   PT: 11.5 sec;   INR: 1.00 ratio         PTT - ( 29 Jan 2023 01:00 )  PTT:28.7 sec          Culture - Blood (collected 29 Jan 2023 06:55)  Source: .Blood Blood-Venous  Preliminary Report (30 Jan 2023 11:01):    No growth to date.    Culture - Blood (collected 29 Jan 2023 06:40)  Source: .Blood Blood-Venous  Preliminary Report (30 Jan 2023 11:01):    No growth to date.      SARS-CoV-2: NotDetec (29 Jan 2023 01:00)      RADIOLOGY & ADDITIONAL TESTS:  New Imaging Personally Reviewed Today:  New Electrocardiogram Personally Reviewed Today:  Other Results Reviewed Today:   Prior or Outpatient Records Reviewed Today with Summary:    COORDINATION OF CARE:  Consultant Communication and Details of Discussion (where applicable):

## 2023-01-30 NOTE — PROGRESS NOTE ADULT - PROBLEM SELECTOR PLAN 1
Pt discussed in detail with Dr. Paul, plan to   - trial regular diet with pt   - d/c home with medrol dose pack   Pt is to follow up at [facility] ENT clinic in 2 weeks. Call [phone] to make appointment.
p/w throat pain and voice change, with CT neck showing supraglottitis w/o airway obstruction, no discrete drainable area.     -Will continue Decadron 8mg Q8 as per ENT, likely d/c on medrol pack   -C/w Ceftriaxone 1g QD, can de-escalate to Augmentin on discharge; Bcx NGTD   -MRSA negative   -Monitor for respiratory distress, drooling, tongue/face swelling   -S/S following, with plan for FEES today - if passes with trial regular diet with plan to d/c after if tolerating   -ENT following - plan for d/c on medrol pack and f/u outpatient follow up at [facility] ENT clinic in 2 weeks: [phone]

## 2023-01-30 NOTE — DISCHARGE NOTE NURSING/CASE MANAGEMENT/SOCIAL WORK - NSDCFUADDAPPT_GEN_ALL_CORE_FT
APPTS ARE READY TO BE MADE: [ x] YES    Best Family or Patient Contact (if needed):    Additional Information about above appointments (if needed):    1: Follow up at [facility] ENT clinic in 2 weeks. Call [phone] to make appointment.  2:   3:     Other comments or requests:

## 2023-01-30 NOTE — DISCHARGE NOTE PROVIDER - NSDCMRMEDTOKEN_GEN_ALL_CORE_FT
amoxicillin-clavulanate 875 mg-125 mg oral tablet: 1 tab(s) orally every 12 hours for 5 more days starting 1/31/23  Medrol Dosepak 4 mg oral tablet: FOLLOW DIRECTIONS ON DOSE PACK ON DOSAGE AND FREQUENCY OF TAKING THE MEDICATION  nicotine 21 mg-14 mg-7 mg transdermal film, extended release: 1 each transdermally once a day   21mg/24hrs  patch x 6 weeks THEN 14mg/24Hrs for 2 weeks then 7mg/24hrs x 2 weeks  STOP CIGARETTE USE at treatment onset

## 2023-01-30 NOTE — SWALLOW FEES ASSESSMENT ADULT - ADDITIONAL RECOMMENDATIONS
Monitor for s/s aspiration/laryngeal penetration. If noted:  D/C p.o. intake, provide non-oral nutrition/hydration/meds, and contact this service @ [phone]  Maintain good oral hygiene  LTG: Pt will tolerate the least restrictive diet without overt signs or symptoms of penetration or aspiration

## 2023-01-30 NOTE — SWALLOW FEES ASSESSMENT ADULT - DIAGNOSTIC IMPRESSIONS
77F with no PMHx presents to the ED with sore throat sensation with difficulty swallowing secretions and voice change that began yesterday around 2 PM and has been worsening. Pt also admits to cough attacks and inability to tolerate po.  Pt is a current smoker for 50+years. Per ENT, Pt found to have supraglottic edema, now improving with steroids. Pt presents with a mild pharyngeal dysphagia, which is likely due to mild supraglottic edema. Swallow sequence is marked by adequate bolus prep and transport of soft and bite sized solids. Due to partially missing u/l dentition and ill-fitting dentures, pt reportedly tolerates "mashed food" at home. There is mild-mod pharyngeal residue across purees, minced/moist solids and soft-bite sized solids, likely due to mild supraglottic edema, which is effectively reduced with volitional secondary/tertiary swallows or a liquid wash. There is no evidence of penetration or aspiration throughout exam. 77F with no PMHx presents to the ED with sore throat sensation with difficulty swallowing secretions and voice change that began yesterday around 2 PM and has been worsening. Pt also admits to cough attacks and inability to tolerate po.  Pt is a current smoker for 50+years. Per ENT, Pt found to have supraglottic edema, now improving with steroids. Of note, due to partially missing u/l dentition and ill-fitting dentures, pt reportedly tolerates "mashed food" at home.  Pt presents with a mild pharyngeal dysphagia, which is likely due to mild supraglottic edema. Swallow sequence is marked by adequate bolus prep and transport of soft and bite sized solids. There is mild-mod pharyngeal residue across purees, minced/moist solids and soft-bite sized solids, likely due to mild supraglottic edema, which is effectively reduced with volitional secondary/tertiary swallows or a liquid wash. There is no evidence of penetration or aspiration throughout exam.

## 2023-02-03 LAB
CULTURE RESULTS: SIGNIFICANT CHANGE UP
CULTURE RESULTS: SIGNIFICANT CHANGE UP
SPECIMEN SOURCE: SIGNIFICANT CHANGE UP
SPECIMEN SOURCE: SIGNIFICANT CHANGE UP

## 2023-03-23 PROBLEM — Z78.9 OTHER SPECIFIED HEALTH STATUS: Chronic | Status: ACTIVE | Noted: 2023-01-29

## 2023-03-24 ENCOUNTER — APPOINTMENT (OUTPATIENT)
Dept: GASTROENTEROLOGY | Facility: CLINIC | Age: 78
End: 2023-03-24
Payer: MEDICARE

## 2023-03-24 VITALS
HEART RATE: 87 BPM | HEIGHT: 63 IN | DIASTOLIC BLOOD PRESSURE: 69 MMHG | OXYGEN SATURATION: 97 % | SYSTOLIC BLOOD PRESSURE: 121 MMHG | BODY MASS INDEX: 26.58 KG/M2 | TEMPERATURE: 97.7 F | WEIGHT: 150 LBS

## 2023-03-24 DIAGNOSIS — R09.89 OTHER SPECIFIED SYMPTOMS AND SIGNS INVOLVING THE CIRCULATORY AND RESPIRATORY SYSTEMS: ICD-10-CM

## 2023-03-24 DIAGNOSIS — R93.89 ABNORMAL FINDINGS ON DIAGNOSTIC IMAGING OF OTHER SPECIFIED BODY STRUCTURES: ICD-10-CM

## 2023-03-24 PROCEDURE — 99204 OFFICE O/P NEW MOD 45 MIN: CPT

## 2023-03-24 NOTE — ASSESSMENT
[FreeTextEntry1] : Patient with globus sensation about 1 month ago.  She was seen by ENT and had a direct laryngoscopy and was referred here.  CT of the soft tissue of the neck revealed possible abnormal thickening of the upper esophagus.  Patient does have some chest discomfort with swallowing cold liquids.  She is likely suffering from esophageal spasm.  She will need to have an upper endoscopy.  She also may need a barium swallow study.  She will be started on famotidine 40 mg daily.

## 2023-03-24 NOTE — REASON FOR VISIT
[Initial Evaluation] : an initial evaluation [FreeTextEntry1] : Sensation of throat closing, Spasm when ingesting cold liquids, Abnormal CT of neck

## 2023-03-24 NOTE — PHYSICAL EXAM
[Alert] : alert [Normal Voice/Communication] : normal voice/communication [Healthy Appearing] : healthy appearing [No Acute Distress] : no acute distress [Sclera] : the sclera and conjunctiva were normal [Hearing Threshold Finger Rub Not [place]] : hearing was normal [Normal Lips/Gums] : the lips and gums were normal [Oropharynx] : the oropharynx was normal [Normal Appearance] : the appearance of the neck was normal [No Neck Mass] : no neck mass was observed [No Respiratory Distress] : no respiratory distress [No Acc Muscle Use] : no accessory muscle use [Respiration, Rhythm And Depth] : normal respiratory rhythm and effort [Auscultation Breath Sounds / Voice Sounds] : lungs were clear to auscultation bilaterally [Heart Rate And Rhythm] : heart rate was normal and rhythm regular [Normal S1, S2] : normal S1 and S2 [Murmurs] : no murmurs [Bowel Sounds] : normal bowel sounds [Abdomen Tenderness] : non-tender [No Masses] : no abdominal mass palpated [Abdomen Soft] : soft [] : no hepatosplenomegaly [No CVA Tenderness] : no CVA  tenderness [No Spinal Tenderness] : no spinal tenderness [Abnormal Walk] : normal gait [Oriented To Time, Place, And Person] : oriented to person, place, and time

## 2023-03-24 NOTE — HISTORY OF PRESENT ILLNESS
[FreeTextEntry1] : Patient is a 78-year-old female referred by ENT.  She presented to the emergency room at the end of January with an episode of a sensation of throat closing and a sore throat.  She had to be sent home from work.  The symptoms lasted over a day.  She had a CAT scan of the soft tissue of the neck which revealed possible thickening of the upper esophagus.  She also had some supraglottitis.  She was treated with amoxicillin, Medrol Dosepak, and Benadryl with improvement.\par She has not had any complaints of dysphagia before but in the past has had some chest discomfort when drinking cold liquids.  She denies any heartburn or regurgitation.

## 2023-05-24 NOTE — ED ADULT TRIAGE NOTE - BP NONINVASIVE DIASTOLIC (MM HG)
Outpatient Physical Therapy  [place]           [] Phone: [phone]   Fax: [phone]  [facility]           [x] Phone: [phone]   Fax: [phone]        Physical Therapy Daily Treatment Note  Date:  2023    Patient Name:  Prosper Rivera    :  1959  MRN: [id number]  Restrictions/Precautions: No data recorded   Position Activity Restriction  Other position/activity restrictions: none  Diagnosis:   cervical ddd Diagnosis: cervical DDD  Date of Injury/Surgery:   Treatment Diagnosis:  neck pain  Insurance/Certification information: [employer] [place] [employer]OMN/ no pre cert  Referring Physician:  LEORA Neal   PCP: Rowena Yeh PA-C  Next Doctor Visit:    Plan of care signed (Y/N):    Outcome Measure: NDI   Visit# / total visits:   3/10 then PN  Pain level: 5/10   Goals:     Patient goals:  less R arm pain  Long Term Goals  Time Frame for Long Term Goals: 6 weeks plan expires 23  patient will be indepedent with HEP  patient will score 10/50 on NDI and will report a score of 1 regarding pain with daily task of driving               Summary of Evaluation:  Assessment: NDI         Subjective:   Patient reports of 5/10 pain upon arrival and reports he tried laying on his bed and slightly dropping his head back over the side of the bed. He reports he did a few but noted pain and was afraid he may cause further damage to his neck. Any changes in Ambulatory Summary Sheet?   None        Objective:    increased pain noted at end of session          Exercises: (No more than 4 columns)   Exercise/Equipment Date 5/15/23 Date 23 Date 23           WARM UP         UBE  5' BWD 5' Bwd 5' Bwd         TABLE                                       STANDING      Corner pec stretch 3 x10 ct 5x10ct 5x10 ct hold   LAE Blue TB 3 x10 Blue TB 3 x10 Blue TB 3 x10   Resisted neck EXT  With RTB  forearms on wall x10 reps With RTB  forearms on wall x15 reps With RTB  forearms on wall 78

## 2023-06-22 ENCOUNTER — APPOINTMENT (OUTPATIENT)
Dept: GASTROENTEROLOGY | Facility: AMBULATORY MEDICAL SERVICES | Age: 78
End: 2023-06-22
Payer: MEDICARE

## 2023-06-22 PROCEDURE — 43239 EGD BIOPSY SINGLE/MULTIPLE: CPT

## 2023-07-10 ENCOUNTER — APPOINTMENT (OUTPATIENT)
Dept: GASTROENTEROLOGY | Facility: CLINIC | Age: 78
End: 2023-07-10
Payer: MEDICARE

## 2023-07-10 ENCOUNTER — RX RENEWAL (OUTPATIENT)
Age: 78
End: 2023-07-10

## 2023-07-10 VITALS
BODY MASS INDEX: 26.58 KG/M2 | TEMPERATURE: 97.3 F | WEIGHT: 150 LBS | HEART RATE: 85 BPM | OXYGEN SATURATION: 99 % | HEIGHT: 63 IN | SYSTOLIC BLOOD PRESSURE: 117 MMHG | DIASTOLIC BLOOD PRESSURE: 73 MMHG

## 2023-07-10 DIAGNOSIS — K22.4 DYSKINESIA OF ESOPHAGUS: ICD-10-CM

## 2023-07-10 DIAGNOSIS — K29.70 GASTRITIS, UNSPECIFIED, W/OUT BLEEDING: ICD-10-CM

## 2023-07-10 DIAGNOSIS — K31.A11 GASTRIC INTESTINAL METAPLASIA WITHOUT DYSPLASIA, INVOLVING THE ANTRUM: ICD-10-CM

## 2023-07-10 DIAGNOSIS — B96.81 GASTRITIS, UNSPECIFIED, W/OUT BLEEDING: ICD-10-CM

## 2023-07-10 PROCEDURE — 99214 OFFICE O/P EST MOD 30 MIN: CPT

## 2023-07-10 NOTE — ASSESSMENT
[FreeTextEntry1] : Patient is status post an upper endoscopy that revealed evidence of esophageal spasm.  Esophageal biopsies were negative.\par Patient had evidence of erosive antritis.  Biopsies revealed evidence of intestinal metaplasia without dysplasia.  H. pylori was positive.\par Patient also had evidence of erosive duodenitis.  She did have some prominent soft folds in the duodenum.  Biopsies were consistent with duodenitis.\par \par Patient does not do well taking meds and she refused H. pylori retreatment.  She is currently asymptomatic.  She was given famotidine 40 mg once a day for healing of her erosive antritis and duodenitis.  I tried to reduce the amount of pills that she would need to take for treatment of H. pylori to less than 10 pills/day but she still refused treatment.

## 2023-07-10 NOTE — HISTORY OF PRESENT ILLNESS
[FreeTextEntry1] : Patient is a 78-year-old female referred by ENT.  She presented to the emergency room at the end of January with an episode of a sensation of throat closing and a sore throat.  She had to be sent home from work.  The symptoms lasted over a day.  She had a CAT scan of the soft tissue of the neck which revealed possible thickening of the upper esophagus.  She also had some supraglottitis.  She was treated with amoxicillin, Medrol Dosepak, and Benadryl with improvement.\par She has not had any complaints of dysphagia before but in the past has had some chest discomfort when drinking cold liquids.  She denies any heartburn or regurgitation.\par \par 7/10/2023-patient is status post an upper endoscopy on 6/22/2023.  She did have a small hiatus hernia the esophagus appeared normal.  There was evidence of esophageal spasm.  Biopsies of the esophagus were negative.  Patient did have evidence of erosive gastritis in the antrum and prepyloric area biopsies were positive for intestinal metaplasia without dysplasia and H. pylori was positive.  She did have evidence of erosive duodenitis and biopsies of the duodenum

## 2023-07-10 NOTE — PHYSICAL EXAM
[Alert] : alert [Normal Voice/Communication] : normal voice/communication [Healthy Appearing] : healthy appearing [No Acute Distress] : no acute distress [Sclera] : the sclera and conjunctiva were normal [Hearing Threshold Finger Rub Not Smith] : hearing was normal [Normal Lips/Gums] : the lips and gums were normal [Oropharynx] : the oropharynx was normal [Normal Appearance] : the appearance of the neck was normal [No Neck Mass] : no neck mass was observed [No Respiratory Distress] : no respiratory distress [No Acc Muscle Use] : no accessory muscle use [Respiration, Rhythm And Depth] : normal respiratory rhythm and effort [Auscultation Breath Sounds / Voice Sounds] : lungs were clear to auscultation bilaterally [Heart Rate And Rhythm] : heart rate was normal and rhythm regular [Normal S1, S2] : normal S1 and S2 [Murmurs] : no murmurs [Bowel Sounds] : normal bowel sounds [Abdomen Tenderness] : non-tender [No Masses] : no abdominal mass palpated [Abdomen Soft] : soft [] : no hepatosplenomegaly [No CVA Tenderness] : no CVA  tenderness [No Spinal Tenderness] : no spinal tenderness [Abnormal Walk] : normal gait [Oriented To Time, Place, And Person] : oriented to person, place, and time

## 2023-10-05 NOTE — ED ADULT NURSE NOTE - NS ED NURSE LEVEL OF CONSCIOUSNESS MENTAL STATUS
Awake/Alert Doxycycline Counseling:  Patient counseled regarding possible photosensitivity and increased risk for sunburn.  Patient instructed to avoid sunlight, if possible.  When exposed to sunlight, patients should wear protective clothing, sunglasses, and sunscreen.  The patient was instructed to call the office immediately if the following severe adverse effects occur:  hearing changes, easy bruising/bleeding, severe headache, or vision changes.  The patient verbalized understanding of the proper use and possible adverse effects of doxycycline.  All of the patient's questions and concerns were addressed.

## 2024-01-08 ENCOUNTER — APPOINTMENT (OUTPATIENT)
Dept: GASTROENTEROLOGY | Facility: CLINIC | Age: 79
End: 2024-01-08

## 2024-03-08 NOTE — ED CLERICAL - NS ED CLERK UNITS
[FreeTextEntry1] :   ----------------------------------------- # Health maintenance 1) HCM - flu vacc this year done . TDAP utd . Encouraged bivalent covid vacc.  SBE, SB, SD, sunblock, exercise/wt loss strategy discussed.  Mammo due - order given . No menopausal syx on screen, Ca/D encouraged.  Colon screen now utd, likely needs repeat 4-5 yrs by polyp type . PAPs utd, have not been positive, now s/p ANGELITO . Full labs for cpe ordered. 2)  HTN - nice reading today, on amlodipine 5 mg.  Maintaining.  Wt/exercise/Na content all reinforced 3) letter for work that she was here given.     APER

## 2024-03-20 ENCOUNTER — APPOINTMENT (OUTPATIENT)
Dept: PHARMACY | Facility: CLINIC | Age: 79
End: 2024-03-20

## 2024-03-21 ENCOUNTER — APPOINTMENT (OUTPATIENT)
Dept: PHARMACY | Facility: CLINIC | Age: 79
End: 2024-03-21

## 2024-04-03 ENCOUNTER — APPOINTMENT (OUTPATIENT)
Dept: PHARMACY | Facility: CLINIC | Age: 79
End: 2024-04-03
Payer: SELF-PAY

## 2024-04-03 PROCEDURE — V5299A: CUSTOM

## 2024-05-31 ENCOUNTER — NON-APPOINTMENT (OUTPATIENT)
Age: 79
End: 2024-05-31

## 2024-06-03 ENCOUNTER — APPOINTMENT (OUTPATIENT)
Dept: PHARMACY | Facility: CLINIC | Age: 79
End: 2024-06-03
Payer: SELF-PAY

## 2024-06-03 PROCEDURE — V5090: CPT

## 2024-06-11 ENCOUNTER — APPOINTMENT (OUTPATIENT)
Dept: OTOLARYNGOLOGY | Facility: CLINIC | Age: 79
End: 2024-06-11
Payer: MEDICARE

## 2024-06-11 VITALS — WEIGHT: 150 LBS | BODY MASS INDEX: 26.58 KG/M2 | HEIGHT: 63 IN

## 2024-06-11 DIAGNOSIS — H90.6 MIXED CONDUCTIVE AND SENSORINEURAL HEARING LOSS, BILATERAL: ICD-10-CM

## 2024-06-11 DIAGNOSIS — H90.3 SENSORINEURAL HEARING LOSS, BILATERAL: ICD-10-CM

## 2024-06-11 DIAGNOSIS — H65.493 OTHER CHRONIC NONSUPPURATIVE OTITIS MEDIA, BILATERAL: ICD-10-CM

## 2024-06-11 PROCEDURE — 99213 OFFICE O/P EST LOW 20 MIN: CPT

## 2024-06-11 PROCEDURE — 92557 COMPREHENSIVE HEARING TEST: CPT

## 2024-06-11 PROCEDURE — 92504 EAR MICROSCOPY EXAMINATION: CPT

## 2024-06-11 PROCEDURE — 92567 TYMPANOMETRY: CPT

## 2024-06-11 RX ORDER — FAMOTIDINE 40 MG/1
40 TABLET, FILM COATED ORAL
Qty: 90 | Refills: 0 | Status: COMPLETED | COMMUNITY
Start: 2023-03-24 | End: 2024-06-11

## 2024-06-11 NOTE — HISTORY OF PRESENT ILLNESS
[de-identified] : 79 year old female here for hearing loss and tinnitus  Last seen in July 2022  She wears b/l hearing aids, recently lost left hearing aid  History of moderate to moderately SNHL bilaterally. (left worse than right) Reports hearing has gradually worsened - using bilateral hearing aid with improvement  Reports intermittent left ear tinnitus -- stable since last seen  Reports intermittent dizziness -- occurs with changes in position -- stable since last seen  Continues daily episodes of vertigo but states the episodes are brief.  Patient denies otalgia, otorrhea, ear infections, headaches related to hearing.

## 2024-06-11 NOTE — DATA REVIEWED
[de-identified] : An audiogram was ordered and performed including tympanometry, pure tones and speech, for patient's complaint of hearing loss I have independently reviewed the patient's audiogram from today and my findings include laurie SNHL with B and C tymps

## 2024-06-11 NOTE — PHYSICAL EXAM
[Midline] : trachea located in midline position [Binocular Microscopic Exam] : Binocular microscopic exam was performed [Hearing Loss Right Only] : diminished [Hearing Loss Left Only] : diminished [Rinne Test Air Conduction Persists > Bone Conduction Right] : air conduction greater than bone conduction on the right [Rinne Test Air Conduction Persists > Bone Conduction Left] : air conduction greater than bone conduction on the left [Hearing Frazier Test (Tuning Fork On Forehead)] : no lateralization of tone [Normal] : gait was normal [Nystagmus] : ~T no ~M nystagmus was seen [Fukuda Step Test] : Fukuda Step Test was Negative [Romberg's Sign] : Romberg's sign was absent [Fistula Sign] : Fistula Sign: Negative [Past-Pointing] : Past-Pointing: Negative [Eduard-Hallmanuelke] : [place]-Hallpike: Negative [de-identified] : retracted [de-identified] : retracted

## 2024-06-26 ENCOUNTER — APPOINTMENT (OUTPATIENT)
Dept: PHARMACY | Facility: CLINIC | Age: 79
End: 2024-06-26

## 2024-09-19 ENCOUNTER — APPOINTMENT (OUTPATIENT)
Dept: UROLOGY | Facility: CLINIC | Age: 79
End: 2024-09-19
Payer: MEDICARE

## 2024-09-19 VITALS
DIASTOLIC BLOOD PRESSURE: 77 MMHG | BODY MASS INDEX: 26.58 KG/M2 | WEIGHT: 150 LBS | HEART RATE: 78 BPM | OXYGEN SATURATION: 99 % | RESPIRATION RATE: 17 BRPM | HEIGHT: 63 IN | SYSTOLIC BLOOD PRESSURE: 128 MMHG

## 2024-09-19 DIAGNOSIS — N39.41 URGE INCONTINENCE: ICD-10-CM

## 2024-09-19 DIAGNOSIS — Z63.4 DISAPPEARANCE AND DEATH OF FAMILY MEMBER: ICD-10-CM

## 2024-09-19 DIAGNOSIS — R39.15 URGENCY OF URINATION: ICD-10-CM

## 2024-09-19 DIAGNOSIS — F17.200 NICOTINE DEPENDENCE, UNSPECIFIED, UNCOMPLICATED: ICD-10-CM

## 2024-09-19 PROCEDURE — 99203 OFFICE O/P NEW LOW 30 MIN: CPT

## 2024-09-19 SDOH — SOCIAL STABILITY - SOCIAL INSECURITY: DISSAPEARANCE AND DEATH OF FAMILY MEMBER: Z63.4

## 2024-09-22 LAB
APPEARANCE: CLEAR
BILIRUBIN URINE: NEGATIVE
BLOOD URINE: NEGATIVE
COLOR: YELLOW
GLUCOSE QUALITATIVE U: NEGATIVE MG/DL
KETONES URINE: NEGATIVE MG/DL
LEUKOCYTE ESTERASE URINE: NEGATIVE
NITRITE URINE: NEGATIVE
PH URINE: 6
PROTEIN URINE: NEGATIVE MG/DL
SPECIFIC GRAVITY URINE: 1.01
URINE CYTOLOGY: NORMAL
UROBILINOGEN URINE: 0.2 MG/DL

## 2024-09-25 NOTE — ASSESSMENT
[FreeTextEntry1] : Sent urine to assess cells Urged her to stop smoking and warned her of SEs and risks. Also mentioned that smoking can cause increased awareness of urine, as well as urgency, and frequency.  Explained that OAB medications can cause blurry vision, constipation, dry mouth, dry eyes. And that if you are on it for prolonged periods of time it can increase your risk for dementia. Explained that it relaxes the bladder. Pt prefers to hold off on medication.  Discussed Kegel exercises etc. All Q's answered and pt expressed understanding.

## 2024-09-25 NOTE — HISTORY OF PRESENT ILLNESS
[FreeTextEntry1] : 2024: CORKY MCCALL is a 79 year-old F who presents with overactive bladder and c/o frequent urination and occasional dripping onset x6 months ago.  Yes smoker, a pack/day for 60 years.  Mixed incontinence, occasionally drips urine when laughing sneezing or coughing, occasionally drips urine when holding for a long time especially when driving. When driving home she barely makes it to the bathroom. She wears x1 pad just in case and reportedly never soaks. Symptoms have been worsening for the last 6 months  She tries to void prior to travelling. [place] in the city and voids "every time she passes a toilet". Reportedly drinks a lot of water  Never kidney stones Had UTI a couple of months ago but no recent episodes Reports normal voiding. Denies burning, pain, or straining while voiding. Denies struggle to start voiding.   Never GH Denies COPD, never heart attack, never stroke If walks too fast, she gets winded  No surgeries x1 vaginal birth never colonoscopy  No FMHx prostate kidney or bladder ca. No FMHx breast uterine or ovarian ca reportedly up to date  D  at 89 yo.  M dx'ed with DM  at 67 yo.  last voided 45 min ago.

## 2024-09-25 NOTE — PHYSICAL EXAM
[Chaperone Present] : A chaperone was present in the examining room during all aspects of the physical examination [FreeTextEntry2] : Soto Allred

## 2024-09-25 NOTE — HISTORY OF PRESENT ILLNESS
[FreeTextEntry1] : 2024: CORKY MCCALL is a 79 year-old F who presents with overactive bladder and c/o frequent urination and occasional dripping onset x6 months ago.  Yes smoker, a pack/day for 60 years.  Mixed incontinence, occasionally drips urine when laughing sneezing or coughing, occasionally drips urine when holding for a long time especially when driving. When driving home she barely makes it to the bathroom. She wears x1 pad just in case and reportedly never soaks. Symptoms have been worsening for the last 6 months  She tries to void prior to travelling. She works full time in the city and voids "every time she passes a toilet". Reportedly drinks a lot of water  Never kidney stones Had UTI a couple of months ago but no recent episodes Reports normal voiding. Denies burning, pain, or straining while voiding. Denies struggle to start voiding.   Never GH Denies COPD, never heart attack, never stroke If walks too fast, she gets winded  No surgeries x1 vaginal birth never colonoscopy  No FMHx prostate kidney or bladder ca. No FMHx breast uterine or ovarian ca reportedly up to date  D  at 89 yo.  M dx'ed with DM  at 67 yo.  last voided 45 min ago.

## 2024-09-25 NOTE — ADDENDUM
[FreeTextEntry1] : This note was partly authored by Soto Allred working as a scribe for MELISSA Haider. I, MELISSA Haider, have reviewed the content of this note and confirm it is true and accurate. I personally performed the history and physical examination and made all the decisions. 09/19/2024.

## 2024-12-09 NOTE — ED ADULT TRIAGE NOTE - MODE OF ARRIVAL
Pt had an ablation on 12/04 with Dr. Harrington. Pt has been experiencing neck and back pain, scheduled for an MRI on 12/11. Pt wants to know if it is ok for her to get her MRI done.    Please advise: [phone]    [employer] in [employer]

## 2025-04-10 ENCOUNTER — APPOINTMENT (OUTPATIENT)
Dept: OTOLARYNGOLOGY | Facility: CLINIC | Age: 80
End: 2025-04-10
Payer: MEDICARE

## 2025-04-10 VITALS
WEIGHT: 150 LBS | HEIGHT: 63 IN | BODY MASS INDEX: 26.58 KG/M2 | HEART RATE: 70 BPM | DIASTOLIC BLOOD PRESSURE: 70 MMHG | SYSTOLIC BLOOD PRESSURE: 125 MMHG

## 2025-04-10 DIAGNOSIS — H65.493 OTHER CHRONIC NONSUPPURATIVE OTITIS MEDIA, BILATERAL: ICD-10-CM

## 2025-04-10 DIAGNOSIS — H90.3 SENSORINEURAL HEARING LOSS, BILATERAL: ICD-10-CM

## 2025-04-10 PROCEDURE — 92567 TYMPANOMETRY: CPT

## 2025-04-10 PROCEDURE — 92557 COMPREHENSIVE HEARING TEST: CPT

## 2025-04-10 PROCEDURE — 92504 EAR MICROSCOPY EXAMINATION: CPT

## 2025-04-10 PROCEDURE — 99213 OFFICE O/P EST LOW 20 MIN: CPT

## 2025-04-10 RX ORDER — AZELASTINE HYDROCHLORIDE AND FLUTICASONE PROPIONATE 137; 50 UG/1; UG/1
137-50 SPRAY, METERED NASAL
Qty: 1 | Refills: 3 | Status: ACTIVE | COMMUNITY
Start: 2025-04-10 | End: 1900-01-01

## 2025-04-10 RX ORDER — AZELASTINE HYDROCHLORIDE 137 UG/1
0.1 SPRAY, METERED NASAL
Qty: 1 | Refills: 3 | Status: ACTIVE | COMMUNITY
Start: 2025-04-10 | End: 1900-01-01

## 2025-04-10 RX ORDER — MONTELUKAST 10 MG/1
10 TABLET, FILM COATED ORAL
Qty: 45 | Refills: 2 | Status: ACTIVE | COMMUNITY
Start: 2025-04-10 | End: 1900-01-01

## 2025-05-22 ENCOUNTER — APPOINTMENT (OUTPATIENT)
Dept: OTOLARYNGOLOGY | Facility: CLINIC | Age: 80
End: 2025-05-22
Payer: MEDICARE

## 2025-05-22 VITALS
DIASTOLIC BLOOD PRESSURE: 79 MMHG | WEIGHT: 150 LBS | HEART RATE: 86 BPM | SYSTOLIC BLOOD PRESSURE: 141 MMHG | BODY MASS INDEX: 26.58 KG/M2 | HEIGHT: 63 IN

## 2025-05-22 DIAGNOSIS — H65.493 OTHER CHRONIC NONSUPPURATIVE OTITIS MEDIA, BILATERAL: ICD-10-CM

## 2025-05-22 PROCEDURE — 92504 EAR MICROSCOPY EXAMINATION: CPT

## 2025-05-22 PROCEDURE — 99213 OFFICE O/P EST LOW 20 MIN: CPT

## 2025-05-27 ENCOUNTER — INPATIENT (INPATIENT)
Facility: HOSPITAL | Age: 80
LOS: 4 days | Discharge: HOME HEALTH | DRG: 156 | End: 2025-06-01
Attending: STUDENT IN AN ORGANIZED HEALTH CARE EDUCATION/TRAINING PROGRAM | Admitting: STUDENT IN AN ORGANIZED HEALTH CARE EDUCATION/TRAINING PROGRAM
Payer: MEDICARE

## 2025-05-27 VITALS
SYSTOLIC BLOOD PRESSURE: 147 MMHG | DIASTOLIC BLOOD PRESSURE: 81 MMHG | RESPIRATION RATE: 20 BRPM | WEIGHT: 154.98 LBS | HEIGHT: 63 IN | HEART RATE: 83 BPM | TEMPERATURE: 97 F | OXYGEN SATURATION: 95 %

## 2025-05-27 DIAGNOSIS — J30.2 OTHER SEASONAL ALLERGIC RHINITIS: ICD-10-CM

## 2025-05-27 DIAGNOSIS — R07.0 PAIN IN THROAT: ICD-10-CM

## 2025-05-27 LAB
ALBUMIN SERPL ELPH-MCNC: 4.3 G/DL — SIGNIFICANT CHANGE UP (ref 3.3–5)
ALP SERPL-CCNC: 75 U/L — SIGNIFICANT CHANGE UP (ref 40–120)
ALT FLD-CCNC: 7 U/L — LOW (ref 10–45)
ANION GAP SERPL CALC-SCNC: 12 MMOL/L — SIGNIFICANT CHANGE UP (ref 5–17)
APTT BLD: 27 SEC — SIGNIFICANT CHANGE UP (ref 26.1–36.8)
AST SERPL-CCNC: 13 U/L — SIGNIFICANT CHANGE UP (ref 10–40)
BASOPHILS # BLD AUTO: 0.02 K/UL — SIGNIFICANT CHANGE UP (ref 0–0.2)
BASOPHILS NFR BLD AUTO: 0.2 % — SIGNIFICANT CHANGE UP (ref 0–2)
BILIRUB SERPL-MCNC: 0.4 MG/DL — SIGNIFICANT CHANGE UP (ref 0.2–1.2)
BUN SERPL-MCNC: 19 MG/DL — SIGNIFICANT CHANGE UP (ref 7–23)
CALCIUM SERPL-MCNC: 9.8 MG/DL — SIGNIFICANT CHANGE UP (ref 8.4–10.5)
CHLORIDE SERPL-SCNC: 99 MMOL/L — SIGNIFICANT CHANGE UP (ref 96–108)
CO2 SERPL-SCNC: 24 MMOL/L — SIGNIFICANT CHANGE UP (ref 22–31)
CREAT SERPL-MCNC: 0.74 MG/DL — SIGNIFICANT CHANGE UP (ref 0.5–1.3)
EGFR: 83 ML/MIN/1.73M2 — SIGNIFICANT CHANGE UP
EGFR: 83 ML/MIN/1.73M2 — SIGNIFICANT CHANGE UP
EOSINOPHIL # BLD AUTO: 0.06 K/UL — SIGNIFICANT CHANGE UP (ref 0–0.5)
EOSINOPHIL NFR BLD AUTO: 0.6 % — SIGNIFICANT CHANGE UP (ref 0–6)
GLUCOSE SERPL-MCNC: 115 MG/DL — HIGH (ref 70–99)
HCT VFR BLD CALC: 37.7 % — SIGNIFICANT CHANGE UP (ref 34.5–45)
HGB BLD-MCNC: 12.7 G/DL — SIGNIFICANT CHANGE UP (ref 11.5–15.5)
IMM GRANULOCYTES NFR BLD AUTO: 0.4 % — SIGNIFICANT CHANGE UP (ref 0–0.9)
INR BLD: 0.98 RATIO — SIGNIFICANT CHANGE UP (ref 0.85–1.16)
LACTATE SERPL-SCNC: 0.8 MMOL/L — SIGNIFICANT CHANGE UP (ref 0.5–2)
LYMPHOCYTES # BLD AUTO: 1.62 K/UL — SIGNIFICANT CHANGE UP (ref 1–3.3)
LYMPHOCYTES # BLD AUTO: 15.4 % — SIGNIFICANT CHANGE UP (ref 13–44)
MCHC RBC-ENTMCNC: 29.3 PG — SIGNIFICANT CHANGE UP (ref 27–34)
MCHC RBC-ENTMCNC: 33.7 G/DL — SIGNIFICANT CHANGE UP (ref 32–36)
MCV RBC AUTO: 86.9 FL — SIGNIFICANT CHANGE UP (ref 80–100)
MONOCYTES # BLD AUTO: 0.87 K/UL — SIGNIFICANT CHANGE UP (ref 0–0.9)
MONOCYTES NFR BLD AUTO: 8.3 % — SIGNIFICANT CHANGE UP (ref 2–14)
NEUTROPHILS # BLD AUTO: 7.91 K/UL — HIGH (ref 1.8–7.4)
NEUTROPHILS NFR BLD AUTO: 75.1 % — SIGNIFICANT CHANGE UP (ref 43–77)
NRBC BLD AUTO-RTO: 0 /100 WBCS — SIGNIFICANT CHANGE UP (ref 0–0)
PLATELET # BLD AUTO: 265 K/UL — SIGNIFICANT CHANGE UP (ref 150–400)
POTASSIUM SERPL-MCNC: 4.4 MMOL/L — SIGNIFICANT CHANGE UP (ref 3.5–5.3)
POTASSIUM SERPL-SCNC: 4.4 MMOL/L — SIGNIFICANT CHANGE UP (ref 3.5–5.3)
PROT SERPL-MCNC: 6.9 G/DL — SIGNIFICANT CHANGE UP (ref 6–8.3)
PROTHROM AB SERPL-ACNC: 11.3 SEC — SIGNIFICANT CHANGE UP (ref 9.9–13.4)
RBC # BLD: 4.34 M/UL — SIGNIFICANT CHANGE UP (ref 3.8–5.2)
RBC # FLD: 14 % — SIGNIFICANT CHANGE UP (ref 10.3–14.5)
SODIUM SERPL-SCNC: 135 MMOL/L — SIGNIFICANT CHANGE UP (ref 135–145)
WBC # BLD: 10.52 K/UL — HIGH (ref 3.8–10.5)
WBC # FLD AUTO: 10.52 K/UL — HIGH (ref 3.8–10.5)

## 2025-05-27 PROCEDURE — 99285 EMERGENCY DEPT VISIT HI MDM: CPT

## 2025-05-27 PROCEDURE — 71046 X-RAY EXAM CHEST 2 VIEWS: CPT | Mod: 26

## 2025-05-27 PROCEDURE — 70491 CT SOFT TISSUE NECK W/DYE: CPT | Mod: 26

## 2025-05-27 PROCEDURE — 93010 ELECTROCARDIOGRAM REPORT: CPT

## 2025-05-27 RX ORDER — SODIUM CHLORIDE 0.65 %
1 AEROSOL, SPRAY (ML) NASAL
Refills: 0 | Status: DISCONTINUED | OUTPATIENT
Start: 2025-05-27 | End: 2025-06-01

## 2025-05-27 RX ORDER — AMPICILLIN SODIUM AND SULBACTAM SODIUM 1; .5 G/1; G/1
3 INJECTION, POWDER, FOR SOLUTION INTRAMUSCULAR; INTRAVENOUS ONCE
Refills: 0 | Status: COMPLETED | OUTPATIENT
Start: 2025-05-27 | End: 2025-05-27

## 2025-05-27 RX ORDER — AMPICILLIN SODIUM AND SULBACTAM SODIUM 1; .5 G/1; G/1
3 INJECTION, POWDER, FOR SOLUTION INTRAMUSCULAR; INTRAVENOUS ONCE
Refills: 0 | Status: DISCONTINUED | OUTPATIENT
Start: 2025-05-27 | End: 2025-05-27

## 2025-05-27 RX ORDER — DEXAMETHASONE 0.5 MG/1
10 TABLET ORAL EVERY 8 HOURS
Refills: 0 | Status: DISCONTINUED | OUTPATIENT
Start: 2025-05-27 | End: 2025-05-30

## 2025-05-27 RX ORDER — AMPICILLIN SODIUM AND SULBACTAM SODIUM 1; .5 G/1; G/1
INJECTION, POWDER, FOR SOLUTION INTRAMUSCULAR; INTRAVENOUS
Refills: 0 | Status: DISCONTINUED | OUTPATIENT
Start: 2025-05-27 | End: 2025-05-30

## 2025-05-27 RX ORDER — DEXAMETHASONE 0.5 MG/1
6 TABLET ORAL ONCE
Refills: 0 | Status: DISCONTINUED | OUTPATIENT
Start: 2025-05-27 | End: 2025-05-27

## 2025-05-27 RX ORDER — AMPICILLIN SODIUM AND SULBACTAM SODIUM 1; .5 G/1; G/1
3 INJECTION, POWDER, FOR SOLUTION INTRAMUSCULAR; INTRAVENOUS EVERY 6 HOURS
Refills: 0 | Status: DISCONTINUED | OUTPATIENT
Start: 2025-05-28 | End: 2025-05-30

## 2025-05-27 RX ORDER — FLUTICASONE PROPIONATE 50 UG/1
1 SPRAY, METERED NASAL
Refills: 0 | Status: DISCONTINUED | OUTPATIENT
Start: 2025-05-27 | End: 2025-06-01

## 2025-05-27 RX ADMIN — DEXAMETHASONE 100 MILLIGRAM(S): 0.5 TABLET ORAL at 22:08

## 2025-05-27 RX ADMIN — AMPICILLIN SODIUM AND SULBACTAM SODIUM 200 GRAM(S): 1; .5 INJECTION, POWDER, FOR SOLUTION INTRAMUSCULAR; INTRAVENOUS at 22:46

## 2025-05-27 RX ADMIN — Medication 1600 MILLILITER(S): at 21:47

## 2025-05-27 NOTE — CONSULT NOTE ADULT - PROBLEM SELECTOR RECOMMENDATION 9
- admit to medicine  - rec continuous pulse Ox monitoring   - Low threshold for MICU consult or intubation if pt develop shortness of breath or stridor   - Rec IV decadron 10mg Q8H for 24-48 hours.   - rec IV pepcide 40mg BID   - rec iv benadryl 25mg BID   - followup on CT neck   - NPO while on IV fluid for now   - ENT will rescope in AM.   - HOB elevation  - ENT is following. - admit to medicine  - rec continuous pulse Ox monitoring   - Low threshold for MICU consult or intubation if pt develop shortness of breath or stridor   - Rec IV decadron 10mg Q8H for 48 hours.   - rec IV pepcide 40mg BID   - rec iv benadryl 25mg BID   - followup on CT neck   - NPO while on IV fluid for now   - ENT will rescope in AM.   - HOB elevation  - ENT is following.

## 2025-05-27 NOTE — CONSULT NOTE ADULT - PROBLEM SELECTOR RECOMMENDATION 9
- admit to medicine  - rec continuous pulse Ox monitoring   - Low threshold for MICU consult or intubation if pt develop shortness of breath or stridor   - Rec IV decadron 10mg Q8H for 24-48 hours.   - rec IV pepcide 40mg BID   - followup on CT neck   - NPO while on IV fluid for now   - ENT will rescope in AM.   - HOB elevation  - ENT is following. - admit to medicine  - rec continuous pulse Ox monitoring   - Low threshold for MICU consult or intubation if pt develop shortness of breath or stridor   - Rec IV decadron 10mg Q8H for 24-48 hours.   - rec IV pepcide 40mg BID   - rec iv benadryl 25mg BID   - followup on CT neck   - NPO while on IV fluid for now   - ENT will rescope in AM.   - HOB elevation  - ENT is following.

## 2025-05-27 NOTE — ED PROVIDER NOTE - CLINICAL SUMMARY MEDICAL DECISION MAKING FREE TEXT BOX
81yo F with PMHx of supraglottic edema p/w cough and difficulty swallowing secretions since yesterday. Endorses a cough for two weeks. Endorses feeling some chills. Denies any sick contacts or recent travel. Recent hospitalization earlier this ear for a similar presentation and found to have supraglottic edema that required admission and steroids. No tonsillar swelling or exudates seen on my exam. Concern for possible lower airway abscess, swelling vs. more likely supraglottic edema again. Will continue to monitor on pulse ox. No stridor currently. Will obtain CT imaging and ENT evaluation.

## 2025-05-27 NOTE — ED ADULT NURSE REASSESSMENT NOTE - NS ED NURSE REASSESS COMMENT FT1
pt has oral suction set up at bed side, not tolerating oral secretions, voice is gurgling noted. ARTUR owen aware. pt speaking in complete sentences. ENT at bed side again for reval. oxygen sat WDL on room air.

## 2025-05-27 NOTE — ED PROVIDER NOTE - OBJECTIVE STATEMENT
80-year-old female with history of seasonal allergies takes only Allegra presents to the emergency department for severe throat pain.  Patient reports she is having trouble managing her secretions feels like she is drowning in them.  She reports that she has had a cough for several days but otherwise no fever.  She denies swallowing anything that could have gotten stuck.  Reports the last thing she ate was a bean salad which was yesterday.  She denies that moving her neck makes the pain worse.

## 2025-05-27 NOTE — CONSULT NOTE ADULT - PROBLEM SELECTOR RECOMMENDATION 2
- rec azelastine nasal spray 1 spray into each nostril BID x 7 days (pending order)   - rec Flonase nasal spray 1 spray into each nostril BID x 7 days (ordered)   - rec nasal saline 1 spray into each nostril TID x 14 days (ordered)   - rec outpatient evaluation by allergist.
- rec azelastine nasal spray 1 spray into each nostril BID x 7 days (pending order)   - rec Flonase nasal spray 1 spray into each nostril BID x 7 days (ordered)   - rec nasal saline 1 spray into each nostril TID x 14 days (ordered)   - rec outpatient evaluation by allergist.

## 2025-05-27 NOTE — CONSULT NOTE ADULT - ASSESSMENT
11 y/o, F, with history of seasonal allergies, presents to ED for 2 day history severe throat pain and difficulty swallowing secretion. ENT consulted for airway evaluation. Oral exam is unremarkable.  No pooling of secretion noted in mouth. No uvula edema. On Fiberoptic flexible laryngoscopy, visualized moderate to severe bilateral nasal congestion, clear nasal streaking noted in posterior nasopharynx, c/w . +pooling of oral secretion around esophageal inlet and b/l piriform sinuses. +moderate to severe bilateral arytenoid edema/swelling. no active aspiration. able to maintain airway with mobile b/l vocal cords. 9 y/o, F, with history of seasonal allergies, presents to ED for 2 day history severe throat pain and difficulty swallowing secretion. ENT consulted for airway evaluation. Oral exam is unremarkable.  No pooling of secretion noted in mouth. No uvula edema. On Fiberoptic flexible laryngoscopy, visualized moderate to severe bilateral nasal congestion, clear nasal streaking noted in posterior nasopharynx, c/w allergic rhinitis. +pooling of oral secretion around esophageal inlet and b/l piriform sinuses. +moderate to severe bilateral arytenoid edema/swelling. no active aspiration. able to maintain airway with mobile b/l vocal cords.

## 2025-05-27 NOTE — CHART NOTE - NSCHARTNOTEFT_GEN_A_CORE
Primary team called, saying pt is feeling worse.  Pt is status post 10mg of IV decadron around 10pm. Pt immediately seen at bedside. She appeared to be more uncomfortable with an audible gurgling sound in her throat.  O2 sat 95% at room air. Per pt, she feels a ball of phlegm in the throat. She is not able to swallow it down or cough it out.  Immediate fiberoptic flexible laryngoscopy showed mild improvement. Less pooling of oral secretion in larynx. the swelling of left arytenoid appears to have improved and smaller. Right arytenoid watery edema remains the same. pt is able to protect airway. Scope findings were reviewed with ICU team at bedside.     #severe throat pain 2/2 significant bilateral arytenoid edema   - c/w IV decadron 10mg Q8 for 24-48 hours   - c/w  IV pepcide 40mg BID   - c/w iv benadryl 25mg BID   - ENT will perform serial close followup scoping   - s/p CT neck, will followup   - pls c/w continuous pulse Ox monitoring   - Low threshold for MICU or intubation if pt develop shortness of breath or stridor   - NPO for now  - HOB elevation  - ENT is following.  - please call [phone] if concerns or issues.   - case discussed and scope reviewed with attending Dr Paul Primary team called, saying pt is feeling worse.  Pt is status post 10mg of IV decadron around 10pm. Pt immediately seen at bedside. She appeared to be more uncomfortable with an audible gurgling sound in her throat.  O2 sat 95% at room air. Per pt, she feels a ball of phlegm in the throat. She is not able to swallow it down or cough it out.  Immediate fiberoptic flexible laryngoscopy showed mild improvement. Less pooling of oral secretion in larynx. the swelling of left arytenoid appears to have improved and smaller. Right arytenoid watery edema remains the same. pt is able to protect airway. Scope findings were reviewed with ICU team at bedside.     #Supraglottic Edema   - c/w IV decadron 10mg Q8 for 24-48 hours   - c/w  IV pepcide 40mg BID   - c/w iv benadryl 25mg BID   - ENT will perform serial close followup scoping   - s/p CT neck, will followup   - pls c/w continuous pulse Ox monitoring   - Low threshold for MICU or intubation if pt develop shortness of breath or stridor   - NPO for now  - HOB elevation  - ENT is following.  - please call [phone] if concerns or issues.   - case discussed and scope reviewed with attending Dr Paul Primary team called, saying pt is feeling worse.  Pt is status post 10mg of IV decadron around 10pm. Pt immediately seen at bedside. She appeared to be more uncomfortable with an audible gurgling sound in her throat.  O2 sat 95% at room air. Per pt, she feels a ball of phlegm in the throat. She is not able to swallow it down or cough it out.  Immediate fiberoptic flexible laryngoscopy showed mild improvement. Less pooling of oral secretion in larynx. the swelling of left arytenoid appears to have improved and smaller. Right arytenoid watery edema remains the same. pt is able to protect airway. Scope findings were reviewed with ICU team at bedside.     #Supraglottic Edema   - c/w IV decadron 10mg Q8 for 24-48 hours   - c/w  IV pepcide 40mg BID   - c/w iv benadryl 25mg BID   - c/w IV unasyn   - ENT will perform serial close followup scoping   - s/p CT neck, will followup   - pls c/w continuous pulse Ox monitoring   - Low threshold for MICU or intubation if pt develop shortness of breath or stridor   - NPO for now  - HOB elevation  - ENT is following.  - please call [phone] if concerns or issues.   - case discussed and scope reviewed with attending Dr Paul    #IMAGING  ACC: [id number] EXAM: CT NECK SOFT TISSUE IC ORDERED BY: DEEPIKA BOTELLO  PROCEDURE DATE: 05/27/2025  INTERPRETATION: CLINICAL INFORMATION: Foreign body sensation.    TECHNIQUE: Thin section axial CT images are obtained through the soft tissues of the neck after the administration of intravenous contrast. Images are reformatted in sagittal and coronal planes. 90 cc of Omnipaque 300 are administered without event. 10 cc are discarded.    COMPARISON: CT the soft tissues of the neck from 1/29/2023.    FINDINGS:  The visualized portions of the brain are unremarkable. The intraorbital soft tissues are unremarkable. The visualized paranasal sinuses are clear apart from mild bilateral ethmoid mucosal thickening, moderate right maxillary sinus mucosal thickening, and minimal left maxillary sinus mucosal thickening. There are trace bilateral mastoid effusions in under pneumatized mastoid air cells.    Within the confines of this exam, there is no radiopaque foreign body within the visualized aerodigestive tract; presence of intravascular contrast limits evaluation for foreign bodies. There is no significant enlargement of the nasopharyngeal or oropharyngeal lymphoid tissue. There is no evidence of a peritonsillar abscess. There is no retropharyngeal edema or collection. There is supraglottic edema with moderate to severe thickening of the aryepiglottic folds with mild infiltration of the surrounding fat. The vocal cords are opposed. The subglottic airway is patent. There is mild nonspecific thickening of the proximal esophageal walls.    The parotid glands and submandibular glands are unremarkable. The thyroid gland is unremarkable in appearance.  There is no significant cervical lymphadenopathy.  There are atherosclerotic calcifications at the carotid bifurcations.  The lung apices demonstrate mild emphysema and scarring.  There are degenerative changes of the spine.    IMPRESSION:  No radiopaque foreign body within the visualized aerodigestive tract; presence of intravascular contrast limits evaluation for foreign body.  Supraglottitis. Similar findings were seen on prior exam from 1/29/2023.  Mild nonspecific thickening of the proximal esophageal walls.    --- End of Report --- Primary team called, saying pt is feeling worse.  Pt is status post 10mg of IV decadron around 10pm. Pt immediately seen at bedside. She appeared to be more uncomfortable with an audible gurgling sound in her throat.  O2 sat 95% at room air. Per pt, she feels a ball of phlegm in the throat. She is not able to swallow it down or cough it out.  Immediate fiberoptic flexible laryngoscopy showed mild improvement. Less pooling of oral secretion in larynx. the swelling of left arytenoid appears to have improved and smaller. Right arytenoid watery edema remains the same. pt is able to protect airway. Scope findings were reviewed with ICU team at bedside.     #Supraglottic Edema   - c/w IV decadron 10mg Q8 for 24-48 hours   - c/w  IV pepcide 40mg BID   - c/w iv benadryl 25mg BID   - c/w IV unasyn   - ENT will perform serial close followup scoping   - CT neck reviewed, consistent with scope findings.   - pls c/w continuous pulse Ox monitoring   - Low threshold for MICU or intubation if pt develop shortness of breath or stridor   - NPO for now  - HOB elevation  - ENT is following.  - please call [phone] if concerns or issues.   - case discussed and scope reviewed with attending Dr Paul    #IMAGING  ACC: [id number] EXAM: CT NECK SOFT TISSUE IC ORDERED BY: DEEPIKA BOTELLO  PROCEDURE DATE: 05/27/2025  INTERPRETATION: CLINICAL INFORMATION: Foreign body sensation.    TECHNIQUE: Thin section axial CT images are obtained through the soft tissues of the neck after the administration of intravenous contrast. Images are reformatted in sagittal and coronal planes. 90 cc of Omnipaque 300 are administered without event. 10 cc are discarded.    COMPARISON: CT the soft tissues of the neck from 1/29/2023.    FINDINGS:  The visualized portions of the brain are unremarkable. The intraorbital soft tissues are unremarkable. The visualized paranasal sinuses are clear apart from mild bilateral ethmoid mucosal thickening, moderate right maxillary sinus mucosal thickening, and minimal left maxillary sinus mucosal thickening. There are trace bilateral mastoid effusions in under pneumatized mastoid air cells.    Within the confines of this exam, there is no radiopaque foreign body within the visualized aerodigestive tract; presence of intravascular contrast limits evaluation for foreign bodies. There is no significant enlargement of the nasopharyngeal or oropharyngeal lymphoid tissue. There is no evidence of a peritonsillar abscess. There is no retropharyngeal edema or collection. There is supraglottic edema with moderate to severe thickening of the aryepiglottic folds with mild infiltration of the surrounding fat. The vocal cords are opposed. The subglottic airway is patent. There is mild nonspecific thickening of the proximal esophageal walls.    The parotid glands and submandibular glands are unremarkable. The thyroid gland is unremarkable in appearance.  There is no significant cervical lymphadenopathy.  There are atherosclerotic calcifications at the carotid bifurcations.  The lung apices demonstrate mild emphysema and scarring.  There are degenerative changes of the spine.    IMPRESSION:  No radiopaque foreign body within the visualized aerodigestive tract; presence of intravascular contrast limits evaluation for foreign body.  Supraglottitis. Similar findings were seen on prior exam from 1/29/2023.  Mild nonspecific thickening of the proximal esophageal walls.    --- End of Report --- Primary team called, saying pt is feeling worse.  Pt is status post 10mg of IV decadron around 10pm. Pt immediately seen at bedside. She appeared to be more uncomfortable with an audible gurgling sound in her throat.  O2 sat 95% at room air. Per pt, she feels a ball of phlegm in the throat. She is not able to swallow it down or cough it out.  Immediate fiberoptic flexible laryngoscopy showed mild improvement. Less pooling of oral secretion in larynx. the swelling of left arytenoid appears to have improved and smaller. Right arytenoid watery edema remains the same. pt is able to protect airway. Scope findings were reviewed with ICU team at bedside.     #Supraglottic Edema   - c/w IV decadron 10mg Q8 for 48 hours   - c/w  IV pepcide 40mg BID   - c/w iv benadryl 25mg BID   - c/w IV unasyn   - ENT will perform serial close followup scoping   - CT neck reviewed, consistent with scope findings.   - pls c/w continuous pulse Ox monitoring   - Low threshold for MICU or intubation if pt develop shortness of breath or stridor   - NPO for now  - HOB elevation  - ENT is following.  - please call [phone] if concerns or issues.   - case discussed and scope reviewed with attending Dr Paul    #IMAGING  ACC: [id number] EXAM: CT NECK SOFT TISSUE IC ORDERED BY: DEEPIKA BOTELLO  PROCEDURE DATE: 05/27/2025  INTERPRETATION: CLINICAL INFORMATION: Foreign body sensation.    TECHNIQUE: Thin section axial CT images are obtained through the soft tissues of the neck after the administration of intravenous contrast. Images are reformatted in sagittal and coronal planes. 90 cc of Omnipaque 300 are administered without event. 10 cc are discarded.    COMPARISON: CT the soft tissues of the neck from 1/29/2023.    FINDINGS:  The visualized portions of the brain are unremarkable. The intraorbital soft tissues are unremarkable. The visualized paranasal sinuses are clear apart from mild bilateral ethmoid mucosal thickening, moderate right maxillary sinus mucosal thickening, and minimal left maxillary sinus mucosal thickening. There are trace bilateral mastoid effusions in under pneumatized mastoid air cells.    Within the confines of this exam, there is no radiopaque foreign body within the visualized aerodigestive tract; presence of intravascular contrast limits evaluation for foreign bodies. There is no significant enlargement of the nasopharyngeal or oropharyngeal lymphoid tissue. There is no evidence of a peritonsillar abscess. There is no retropharyngeal edema or collection. There is supraglottic edema with moderate to severe thickening of the aryepiglottic folds with mild infiltration of the surrounding fat. The vocal cords are opposed. The subglottic airway is patent. There is mild nonspecific thickening of the proximal esophageal walls.    The parotid glands and submandibular glands are unremarkable. The thyroid gland is unremarkable in appearance.  There is no significant cervical lymphadenopathy.  There are atherosclerotic calcifications at the carotid bifurcations.  The lung apices demonstrate mild emphysema and scarring.  There are degenerative changes of the spine.    IMPRESSION:  No radiopaque foreign body within the visualized aerodigestive tract; presence of intravascular contrast limits evaluation for foreign body.  Supraglottitis. Similar findings were seen on prior exam from 1/29/2023.  Mild nonspecific thickening of the proximal esophageal walls.    --- End of Report ---

## 2025-05-27 NOTE — CONSULT NOTE ADULT - SUBJECTIVE AND OBJECTIVE BOX
CC: severe sore throat     HPI: 81 y/o, F, with history of seasonal allergies, presents to ED for 2 day history severe throat pain and difficulty swallowing secretion. ENT consulted for airway evaluation. Per patient, she had similar symptoms 2 years ago, but not as worse. Currently endorses having oral secretion in that throat that she having mild difficulty spitting or coughing out. Also endorses seasonal allergies and that she can't breathe from her nose. But no throat tightening sensation, shortness of breath, chest pain or headache. Also denies eating anything with small or sharp bones (like fish, or chicken) in the past 2 to 3 days. She was able to eat a bean salad yesterday. Denies fever, chills, nausea, vomiting, hemoptysis or hematemesis.     PAST MEDICAL & SURGICAL HISTORY:  No pertinent past medical history  No significant past surgical history    Allergies  No Known Allergies    Intolerances    MEDICATIONS  (STANDING):  ampicillin/sulbactam  IVPB 3 Gram(s) IV Intermittent once  ampicillin/sulbactam  IVPB      dexAMETHasone  IVPB 10 milliGRAM(s) IV Intermittent every 8 hours  fluticasone propionate 50 MICROgram(s)/spray Nasal Spray 1 Spray(s) Both Nostrils two times a day  sodium chloride 0.65% Nasal 1 Spray(s) Both Nostrils four times a day    MEDICATIONS  (PRN):    Social History: denies smoking or drinking   Family history: denies pertinent family history     ROS:   ENT: all negative except as noted in HPI   CV: denies palpitations  Pulm: denies SOB, hemoptysis  GI: endorses poor appetite   : denies pertinent urinary symptoms, urgency  Neuro: denies numbness/tingling, loss of sensation  Psych: denies anxiety  MS: denies muscle weakness, instability  Heme: denies easy bruising or bleeding  Endo: denies heat/cold intolerance, excessive sweating  Vascular: denies LE edema    Vital Signs Last 24 Hrs  T(C): 36.6 (27 May 2025 21:11), Max: 36.6 (27 May 2025 21:11)  T(F): 97.9 (27 May 2025 21:11), Max: 97.9 (27 May 2025 21:11)  HR: 87 (27 May 2025 21:38) (71 - 87)  BP: 159/77 (27 May 2025 21:38) (147/81 - 161/83)  BP(mean): --  RR: 20 (27 May 2025 21:38) (20 - 20)  SpO2: 98% (27 May 2025 21:38) (95% - 98%)    Parameters below as of 27 May 2025 21:38  Patient On (Oxygen Delivery Method): room air                       12.7   10.52 )-----------( 265      ( 27 May 2025 21:43 )             37.7    05-27    135  |  99  |  19  ----------------------------<  115[H]  4.4   |  24  |  0.74    Ca    9.8      27 May 2025 21:43    TPro  6.9  /  Alb  4.3  /  TBili  0.4  /  DBili  x   /  AST  13  /  ALT  7[L]  /  AlkPhos  75  05-27   PT/INR - ( 27 May 2025 21:43 )   PT: 11.3 sec;   INR: 0.98 ratio         PTT - ( 27 May 2025 21:43 )  PTT:27.0 sec    PHYSICAL EXAM:  Gen: NAD  Skin: No rashes, bruises, or lesions  Head: Normocephalic, Atraumatic  Face: no edema, erythema, or fluctuance. Parotid glands soft without mass  Eyes: no scleral injection  Nose: Nares bilaterally patent, no discharge  Mouth: No Stridor / Drooling / Trismus.  Mucosa moist, tongue/uvula midline, oropharynx clear  Neck: Flat, supple, no lymphadenopathy, trachea midline, no masses  Lymphatic: No lymphadenopathy  Resp: breathing easily, no stridor  CV: no peripheral edema/cyanosis  GI: nondistended   Peripheral vascular: no JVD or edema  Neuro: facial nerve intact, no facial droop    #PROCEDURE  Fiberoptic Flexible Laryngoscopy (Ambu scope used):  Reason for Laryngoscopy: severe sore throat   Patient was unable to cooperate with mirror.    moderate to severe bilateral nasal congestion, clear nasal streaking noted in posterior nasopharynx. +pooling of oral secretion around esophageal inlet and b/l piriform sinuses. +moderate to severe bilateral arytenoid edema/swelling. no active aspiration. able to maintain airway with mobile b/l vocal cords. Tongue base with pooling of secretion. Vocal cords mobile with good contact b/l.       CC: severe sore throat     HPI: 79 y/o, F, with history of seasonal allergies, presents to ED for 2 day history severe throat pain and difficulty swallowing secretion. ENT consulted for airway evaluation. Per patient, she had similar symptoms 2 years ago, but not as worse. Currently endorses having oral secretion in the throat that she having mild difficulty spitting or coughing out. Also endorses seasonal allergies and that she can't breathe from her nose. But no throat tightening sensation, shortness of breath, chest pain or headache. Also denies eating anything with small or sharp bones (like fish, or chicken) in the past 2 to 3 days. She was able to eat a bean salad yesterday. Denies fever, chills, nausea, vomiting, hemoptysis or hematemesis.     PAST MEDICAL & SURGICAL HISTORY:  No pertinent past medical history  No significant past surgical history    Allergies  No Known Allergies    Intolerances    MEDICATIONS  (STANDING):  ampicillin/sulbactam  IVPB 3 Gram(s) IV Intermittent once  ampicillin/sulbactam  IVPB      dexAMETHasone  IVPB 10 milliGRAM(s) IV Intermittent every 8 hours  fluticasone propionate 50 MICROgram(s)/spray Nasal Spray 1 Spray(s) Both Nostrils two times a day  sodium chloride 0.65% Nasal 1 Spray(s) Both Nostrils four times a day    MEDICATIONS  (PRN):    Social History: denies smoking or drinking   Family history: denies pertinent family history     ROS:   ENT: all negative except as noted in HPI   CV: denies palpitations  Pulm: denies SOB, hemoptysis  GI: endorses poor appetite   : denies pertinent urinary symptoms, urgency  Neuro: denies numbness/tingling, loss of sensation  Psych: denies anxiety  MS: denies muscle weakness, instability  Heme: denies easy bruising or bleeding  Endo: denies heat/cold intolerance, excessive sweating  Vascular: denies LE edema    Vital Signs Last 24 Hrs  T(C): 36.6 (27 May 2025 21:11), Max: 36.6 (27 May 2025 21:11)  T(F): 97.9 (27 May 2025 21:11), Max: 97.9 (27 May 2025 21:11)  HR: 87 (27 May 2025 21:38) (71 - 87)  BP: 159/77 (27 May 2025 21:38) (147/81 - 161/83)  BP(mean): --  RR: 20 (27 May 2025 21:38) (20 - 20)  SpO2: 98% (27 May 2025 21:38) (95% - 98%)    Parameters below as of 27 May 2025 21:38  Patient On (Oxygen Delivery Method): room air                       12.7   10.52 )-----------( 265      ( 27 May 2025 21:43 )             37.7    05-27    135  |  99  |  19  ----------------------------<  115[H]  4.4   |  24  |  0.74    Ca    9.8      27 May 2025 21:43    TPro  6.9  /  Alb  4.3  /  TBili  0.4  /  DBili  x   /  AST  13  /  ALT  7[L]  /  AlkPhos  75  05-27   PT/INR - ( 27 May 2025 21:43 )   PT: 11.3 sec;   INR: 0.98 ratio         PTT - ( 27 May 2025 21:43 )  PTT:27.0 sec    PHYSICAL EXAM:  Gen: NAD  Skin: No rashes, bruises, or lesions  Head: Normocephalic, Atraumatic  Face: no edema, erythema, or fluctuance. Parotid glands soft without mass  Eyes: no scleral injection  Nose: Nares bilaterally patent, no discharge  Mouth: No Stridor / Drooling / Trismus.  Mucosa moist, tongue/uvula midline, oropharynx clear  Neck: Flat, supple, no lymphadenopathy, trachea midline, no masses  Lymphatic: No lymphadenopathy  Resp: breathing easily, no stridor  CV: no peripheral edema/cyanosis  GI: nondistended   Peripheral vascular: no JVD or edema  Neuro: facial nerve intact, no facial droop    #PROCEDURE  Fiberoptic Flexible Laryngoscopy (Ambu scope used):  Reason for Laryngoscopy: severe sore throat   Patient was unable to cooperate with mirror.    moderate to severe bilateral nasal congestion, clear nasal streaking noted in posterior nasopharynx. +pooling of oral secretion around esophageal inlet and b/l piriform sinuses. +moderate to severe bilateral arytenoid edema/swelling. no active aspiration. able to maintain airway with mobile b/l vocal cords. Tongue base with pooling of secretion. Vocal cords mobile with good contact b/l.       CC: severe sore throat     HPI: 81 y/o, F, with history of seasonal allergies, presents to ED for 2 day history severe throat pain and difficulty swallowing secretion. ENT consulted for airway evaluation. Per patient, she had similar symptoms 2 years ago, but not as worse. Currently endorses having oral secretion in the throat that she having mild difficulty spitting or coughing out. Also endorses seasonal allergies and that she can't breathe from her nose. But no throat tightening sensation, shortness of breath, chest pain or headache. Also denies eating anything with small or sharp bones (like fish, or chicken) in the past 2 to 3 days. She was able to eat a bean salad yesterday. Denies fever, chills, nausea, vomiting, hemoptysis or hematemesis.     PAST MEDICAL & SURGICAL HISTORY:  No pertinent past medical history  No significant past surgical history    Allergies  No Known Allergies    Intolerances    MEDICATIONS  (STANDING):  ampicillin/sulbactam  IVPB 3 Gram(s) IV Intermittent once  ampicillin/sulbactam  IVPB      dexAMETHasone  IVPB 10 milliGRAM(s) IV Intermittent every 8 hours  fluticasone propionate 50 MICROgram(s)/spray Nasal Spray 1 Spray(s) Both Nostrils two times a day  sodium chloride 0.65% Nasal 1 Spray(s) Both Nostrils four times a day    MEDICATIONS  (PRN):    Social History: denies drinking   Family history: denies pertinent family history     ROS:   ENT: all negative except as noted in HPI   CV: denies palpitations  Pulm: denies SOB, hemoptysis  GI: endorses poor appetite   : denies pertinent urinary symptoms, urgency  Neuro: denies numbness/tingling, loss of sensation  Psych: denies anxiety  MS: denies muscle weakness, instability  Heme: denies easy bruising or bleeding  Endo: denies heat/cold intolerance, excessive sweating  Vascular: denies LE edema    Vital Signs Last 24 Hrs  T(C): 36.6 (27 May 2025 21:11), Max: 36.6 (27 May 2025 21:11)  T(F): 97.9 (27 May 2025 21:11), Max: 97.9 (27 May 2025 21:11)  HR: 87 (27 May 2025 21:38) (71 - 87)  BP: 159/77 (27 May 2025 21:38) (147/81 - 161/83)  BP(mean): --  RR: 20 (27 May 2025 21:38) (20 - 20)  SpO2: 98% (27 May 2025 21:38) (95% - 98%)    Parameters below as of 27 May 2025 21:38  Patient On (Oxygen Delivery Method): room air                       12.7   10.52 )-----------( 265      ( 27 May 2025 21:43 )             37.7    05-27    135  |  99  |  19  ----------------------------<  115[H]  4.4   |  24  |  0.74    Ca    9.8      27 May 2025 21:43    TPro  6.9  /  Alb  4.3  /  TBili  0.4  /  DBili  x   /  AST  13  /  ALT  7[L]  /  AlkPhos  75  05-27   PT/INR - ( 27 May 2025 21:43 )   PT: 11.3 sec;   INR: 0.98 ratio         PTT - ( 27 May 2025 21:43 )  PTT:27.0 sec    PHYSICAL EXAM:  Gen: NAD  Skin: No rashes, bruises, or lesions  Head: Normocephalic, Atraumatic  Face: no edema, erythema, or fluctuance. Parotid glands soft without mass  Eyes: no scleral injection  Nose: Nares bilaterally patent, no discharge  Mouth: No Stridor / Drooling / Trismus.  Mucosa moist, tongue/uvula midline, oropharynx clear  Neck: Flat, supple, no lymphadenopathy, trachea midline, no masses  Lymphatic: No lymphadenopathy  Resp: breathing easily, no stridor  CV: no peripheral edema/cyanosis  GI: nondistended   Peripheral vascular: no JVD or edema  Neuro: facial nerve intact, no facial droop    #PROCEDURE  Fiberoptic Flexible Laryngoscopy (Ambu scope used):  Reason for Laryngoscopy: severe sore throat   Patient was unable to cooperate with mirror.    moderate to severe bilateral nasal congestion, clear nasal streaking noted in posterior nasopharynx. +pooling of oral secretion around esophageal inlet and b/l piriform sinuses. +moderate to severe bilateral arytenoid edema/swelling. no active aspiration. able to maintain airway with mobile b/l vocal cords. Tongue base with pooling of secretion. Vocal cords mobile with good contact b/l.

## 2025-05-27 NOTE — ED PROVIDER NOTE - PROGRESS NOTE DETAILS
contacted ENT who will come shortly ENT does not feel SICU warranted, plan to rescope at 2 am reassesed patient, not tolerating secretions, states she "feels like she is choking" worse than on arrival. able to speak freely. micu will be consulted. - Roxana Emerson PA-C reassessed patient, not tolerating secretions, states she "feels like she is choking" worse than on arrival. able to speak freely. micu will be consulted. ENT aware and will come rescope- Roxana Emerson PA-C ENT rescoped edema actually improved unilateral. MICU evaluated patient feel can go to the floor. - Roxana Emerson PA-C

## 2025-05-28 DIAGNOSIS — Z29.9 ENCOUNTER FOR PROPHYLACTIC MEASURES, UNSPECIFIED: ICD-10-CM

## 2025-05-28 DIAGNOSIS — J38.4 EDEMA OF LARYNX: ICD-10-CM

## 2025-05-28 DIAGNOSIS — R13.10 DYSPHAGIA, UNSPECIFIED: ICD-10-CM

## 2025-05-28 DIAGNOSIS — F17.200 NICOTINE DEPENDENCE, UNSPECIFIED, UNCOMPLICATED: ICD-10-CM

## 2025-05-28 LAB
A1C WITH ESTIMATED AVERAGE GLUCOSE RESULT: 5.8 % — HIGH (ref 4–5.6)
ALBUMIN SERPL ELPH-MCNC: 3.8 G/DL — SIGNIFICANT CHANGE UP (ref 3.3–5)
ALP SERPL-CCNC: 72 U/L — SIGNIFICANT CHANGE UP (ref 40–120)
ALT FLD-CCNC: 7 U/L — LOW (ref 10–45)
ANION GAP SERPL CALC-SCNC: 15 MMOL/L — SIGNIFICANT CHANGE UP (ref 5–17)
APPEARANCE UR: CLEAR — SIGNIFICANT CHANGE UP
AST SERPL-CCNC: 12 U/L — SIGNIFICANT CHANGE UP (ref 10–40)
BILIRUB SERPL-MCNC: 0.5 MG/DL — SIGNIFICANT CHANGE UP (ref 0.2–1.2)
BILIRUB UR-MCNC: NEGATIVE — SIGNIFICANT CHANGE UP
BUN SERPL-MCNC: 17 MG/DL — SIGNIFICANT CHANGE UP (ref 7–23)
CALCIUM SERPL-MCNC: 9.2 MG/DL — SIGNIFICANT CHANGE UP (ref 8.4–10.5)
CHLORIDE SERPL-SCNC: 101 MMOL/L — SIGNIFICANT CHANGE UP (ref 96–108)
CHOLEST SERPL-MCNC: 166 MG/DL — SIGNIFICANT CHANGE UP
CO2 SERPL-SCNC: 21 MMOL/L — LOW (ref 22–31)
COLOR SPEC: YELLOW — SIGNIFICANT CHANGE UP
CREAT SERPL-MCNC: 0.68 MG/DL — SIGNIFICANT CHANGE UP (ref 0.5–1.3)
DIFF PNL FLD: NEGATIVE — SIGNIFICANT CHANGE UP
EGFR: 89 ML/MIN/1.73M2 — SIGNIFICANT CHANGE UP
EGFR: 89 ML/MIN/1.73M2 — SIGNIFICANT CHANGE UP
ESTIMATED AVERAGE GLUCOSE: 120 MG/DL — HIGH (ref 68–114)
FLUAV AG NPH QL: SIGNIFICANT CHANGE UP
FLUBV AG NPH QL: SIGNIFICANT CHANGE UP
GLUCOSE BLDC GLUCOMTR-MCNC: 113 MG/DL — HIGH (ref 70–99)
GLUCOSE BLDC GLUCOMTR-MCNC: 130 MG/DL — HIGH (ref 70–99)
GLUCOSE BLDC GLUCOMTR-MCNC: 131 MG/DL — HIGH (ref 70–99)
GLUCOSE SERPL-MCNC: 130 MG/DL — HIGH (ref 70–99)
GLUCOSE UR QL: NEGATIVE MG/DL — SIGNIFICANT CHANGE UP
HCT VFR BLD CALC: 36 % — SIGNIFICANT CHANGE UP (ref 34.5–45)
HDLC SERPL-MCNC: 56 MG/DL — SIGNIFICANT CHANGE UP
HGB BLD-MCNC: 11.9 G/DL — SIGNIFICANT CHANGE UP (ref 11.5–15.5)
KETONES UR QL: NEGATIVE MG/DL — SIGNIFICANT CHANGE UP
LDLC SERPL-MCNC: 101 MG/DL — HIGH
LEUKOCYTE ESTERASE UR-ACNC: NEGATIVE — SIGNIFICANT CHANGE UP
LIPID PNL WITH DIRECT LDL SERPL: 101 MG/DL — HIGH
MCHC RBC-ENTMCNC: 28.9 PG — SIGNIFICANT CHANGE UP (ref 27–34)
MCHC RBC-ENTMCNC: 33.1 G/DL — SIGNIFICANT CHANGE UP (ref 32–36)
MCV RBC AUTO: 87.4 FL — SIGNIFICANT CHANGE UP (ref 80–100)
NITRITE UR-MCNC: NEGATIVE — SIGNIFICANT CHANGE UP
NONHDLC SERPL-MCNC: 110 MG/DL — SIGNIFICANT CHANGE UP
NRBC BLD AUTO-RTO: 0 /100 WBCS — SIGNIFICANT CHANGE UP (ref 0–0)
PH UR: 6 — SIGNIFICANT CHANGE UP (ref 5–8)
PLATELET # BLD AUTO: 279 K/UL — SIGNIFICANT CHANGE UP (ref 150–400)
POTASSIUM SERPL-MCNC: 4.3 MMOL/L — SIGNIFICANT CHANGE UP (ref 3.5–5.3)
POTASSIUM SERPL-SCNC: 4.3 MMOL/L — SIGNIFICANT CHANGE UP (ref 3.5–5.3)
PROT SERPL-MCNC: 6.3 G/DL — SIGNIFICANT CHANGE UP (ref 6–8.3)
PROT UR-MCNC: NEGATIVE MG/DL — SIGNIFICANT CHANGE UP
RBC # BLD: 4.12 M/UL — SIGNIFICANT CHANGE UP (ref 3.8–5.2)
RBC # FLD: 14 % — SIGNIFICANT CHANGE UP (ref 10.3–14.5)
RSV RNA NPH QL NAA+NON-PROBE: SIGNIFICANT CHANGE UP
SARS-COV-2 RNA SPEC QL NAA+PROBE: SIGNIFICANT CHANGE UP
SODIUM SERPL-SCNC: 137 MMOL/L — SIGNIFICANT CHANGE UP (ref 135–145)
SOURCE RESPIRATORY: SIGNIFICANT CHANGE UP
SP GR SPEC: >1.03 — HIGH (ref 1–1.03)
TRIGL SERPL-MCNC: 41 MG/DL — SIGNIFICANT CHANGE UP
UROBILINOGEN FLD QL: 0.2 MG/DL — SIGNIFICANT CHANGE UP (ref 0.2–1)
WBC # BLD: 5.94 K/UL — SIGNIFICANT CHANGE UP (ref 3.8–10.5)
WBC # FLD AUTO: 5.94 K/UL — SIGNIFICANT CHANGE UP (ref 3.8–10.5)

## 2025-05-28 PROCEDURE — 99223 1ST HOSP IP/OBS HIGH 75: CPT

## 2025-05-28 RX ORDER — NICOTINE POLACRILEX 4 MG/1
1 GUM, CHEWING ORAL DAILY
Refills: 0 | Status: DISCONTINUED | OUTPATIENT
Start: 2025-05-28 | End: 2025-06-01

## 2025-05-28 RX ORDER — CEFTRIAXONE 500 MG/1
1000 INJECTION, POWDER, FOR SOLUTION INTRAMUSCULAR; INTRAVENOUS EVERY 24 HOURS
Refills: 0 | Status: DISCONTINUED | OUTPATIENT
Start: 2025-05-28 | End: 2025-05-30

## 2025-05-28 RX ORDER — DIPHENHYDRAMINE HCL 12.5MG/5ML
25 ELIXIR ORAL EVERY 12 HOURS
Refills: 0 | Status: DISCONTINUED | OUTPATIENT
Start: 2025-05-28 | End: 2025-05-30

## 2025-05-28 RX ORDER — SODIUM CHLORIDE 9 G/1000ML
1000 INJECTION, SOLUTION INTRAVENOUS
Refills: 0 | Status: DISCONTINUED | OUTPATIENT
Start: 2025-05-28 | End: 2025-05-28

## 2025-05-28 RX ADMIN — DEXAMETHASONE 100 MILLIGRAM(S): 0.5 TABLET ORAL at 23:29

## 2025-05-28 RX ADMIN — NICOTINE POLACRILEX 1 PATCH: 4 GUM, CHEWING ORAL at 14:41

## 2025-05-28 RX ADMIN — AMPICILLIN SODIUM AND SULBACTAM SODIUM 200 GRAM(S): 1; .5 INJECTION, POWDER, FOR SOLUTION INTRAMUSCULAR; INTRAVENOUS at 21:00

## 2025-05-28 RX ADMIN — Medication 40 MILLIGRAM(S): at 05:42

## 2025-05-28 RX ADMIN — Medication 1 SPRAY(S): at 17:24

## 2025-05-28 RX ADMIN — NICOTINE POLACRILEX 1 PATCH: 4 GUM, CHEWING ORAL at 20:25

## 2025-05-28 RX ADMIN — CEFTRIAXONE 100 MILLIGRAM(S): 500 INJECTION, POWDER, FOR SOLUTION INTRAMUSCULAR; INTRAVENOUS at 16:02

## 2025-05-28 RX ADMIN — Medication 25 MILLIGRAM(S): at 05:42

## 2025-05-28 RX ADMIN — Medication 25 MILLIGRAM(S): at 17:25

## 2025-05-28 RX ADMIN — Medication 40 MILLIGRAM(S): at 17:24

## 2025-05-28 RX ADMIN — Medication 60 MILLILITER(S): at 14:40

## 2025-05-28 RX ADMIN — FLUTICASONE PROPIONATE 1 SPRAY(S): 50 SPRAY, METERED NASAL at 17:25

## 2025-05-28 RX ADMIN — Medication 1 SPRAY(S): at 15:19

## 2025-05-28 RX ADMIN — AMPICILLIN SODIUM AND SULBACTAM SODIUM 200 GRAM(S): 1; .5 INJECTION, POWDER, FOR SOLUTION INTRAMUSCULAR; INTRAVENOUS at 05:46

## 2025-05-28 RX ADMIN — DEXAMETHASONE 100 MILLIGRAM(S): 0.5 TABLET ORAL at 05:46

## 2025-05-28 RX ADMIN — DEXAMETHASONE 100 MILLIGRAM(S): 0.5 TABLET ORAL at 15:18

## 2025-05-28 RX ADMIN — AMPICILLIN SODIUM AND SULBACTAM SODIUM 200 GRAM(S): 1; .5 INJECTION, POWDER, FOR SOLUTION INTRAMUSCULAR; INTRAVENOUS at 14:40

## 2025-05-28 NOTE — SWALLOW BEDSIDE ASSESSMENT ADULT - COMMENTS
Supraglottic edema.   Seen on CT.   - Swallow eval  - Continuous pulse Ox monitoring   - Low threshold for MICU consult or intubation if pt develop shortness of breath or stridor   - Decadron 10mg IV Q8H for 48 hours.   - Pepcid 40mg IV BID   - Benadryl 25mg IV BID  - F/u flu/covid/rsv and consider Full RVP  - F/u blood cx.  5/28 Per ENT: On initial laryngoscopy found to have moderate to severe bilateral arytenoid edema, pooling of secretions but has been comfortable on room air. Pt has been on unasyn and decadron 10mg q8 hrs. Repeat laryngoscopy last night showed improving b/l arytenoid edema and pooling of secretions. Repeat laryngoscopy showed b/l arytenoid edema R>L and pooling of secretions continuing to improve, airway patent. Pt comfortable and O2 sat 98% on room air.     Speech/swallow hx: Pt known to this service; seen for FEES s/p admission for supraglottitis in January of 2023; recommended for a soft/bite size diet with thin liquids.

## 2025-05-28 NOTE — ED ADULT NURSE NOTE - PATIENT'S GENDER IDENTITY
normal appearance , without tenderness upon palpation , no deformities , trachea midline , Thyroid normal size , no masses , thyroid nontender
Female

## 2025-05-28 NOTE — PROGRESS NOTE ADULT - PROBLEM SELECTOR PLAN 1
- plan to rescope tomorrow  - c/w IV decadron 10mg Q8 for 48 hours total  - c/w  IV pepcide 40mg BID   - c/w iv benadryl 25mg BID   - c/w IV unasyn   - c/w continuous pulse Ox monitoring   - Low threshold for MICU or intubation if pt develop shortness of breath or stridor   - HOB elevation  - case discussed and scope reviewed with attending Dr Paul  - ENT will continue to follow. Call with questions or concerns x [phone] - plan to rescope tomorrow  - c/w IV decadron 10mg Q8 for 48 hours total  - c/w  IV pepcid 40mg BID   - c/w iv benadryl 25mg BID   - c/w IV unasyn & ceftriaxone  - c/w continuous pulse Ox monitoring   - Low threshold for MICU or intubation if pt develop shortness of breath or stridor   - HOB elevation  - case discussed and scope reviewed with attending Dr Paul  - ENT will continue to follow. Call with questions or concerns x [phone]

## 2025-05-28 NOTE — H&P ADULT - ASSESSMENT
80F w/Hx of vertigo, seasonal allergies and 1ppd smoking presents due to over 1 week of cough and sore throat that progressed the last few days into difficulty swallowing.

## 2025-05-28 NOTE — CONSULT NOTE ADULT - CONSULT REQUESTED BY NAME
ED
ACP made aware, perform orthostatics.
Provider aware. Obtain vitals signs pre and post administration of INFLECTRA IVPB. Continue to monitor
Continue to monitor.
ED

## 2025-05-28 NOTE — CHART NOTE - NSCHARTNOTEFT_GEN_A_CORE
Pt seen at [facility] bed 5. Appeared to be sound asleep, and comfortable breathing at room air. No drooling. After waking patient up, pt endorses feeling much better. Pt's voice sounds better, and there was no more gurgling sound heard around her throat. pt deferred being scoped at this time; however, agreeable for repeat scope in a couple of hours. ENT will re-scope later this AM. Please call [phone] if concerns or issues.

## 2025-05-28 NOTE — CONSULT NOTE ADULT - SUBJECTIVE AND OBJECTIVE BOX
HPI:  80 year old woman with allergic rhinitis who is presenting for severe throat pain. She feels like her secretions are stuck in her throat. Has cough for several days. Has been unable to eat/drink since 9 AM 5/27. Hospitalized in 2023 for supraglottic edema.    ED: 10 IV decadron, unasyn, and 1.6 L IVF. Scoped by ENT twice, showing swelling of arytenoids with improvement of swelling on second evaluation.       PAST MEDICAL & SURGICAL HISTORY:  No pertinent past medical history      No significant past surgical history          Review of Systems:  14 point ROS done and found to be negative or noncontributory other than noted in HPI.    MEDICATIONS  (STANDING):  ampicillin/sulbactam  IVPB 3 Gram(s) IV Intermittent every 6 hours  ampicillin/sulbactam  IVPB      dexAMETHasone  IVPB 10 milliGRAM(s) IV Intermittent every 8 hours  fluticasone propionate 50 MICROgram(s)/spray Nasal Spray 1 Spray(s) Both Nostrils two times a day  sodium chloride 0.65% Nasal 1 Spray(s) Both Nostrils four times a day    MEDICATIONS  (PRN):      HOME MEDICATIONS:    Allergies    No Known Allergies    Intolerances        SOCIAL HISTORY:    FAMILY HISTORY:  FH: type 2 diabetes (Father)        Vital Signs Last 24 Hrs  T(C): 36.7 (28 May 2025 00:13), Max: 36.7 (28 May 2025 00:13)  T(F): 98 (28 May 2025 00:13), Max: 98 (28 May 2025 00:13)  HR: 99 (28 May 2025 00:13) (71 - 99)  BP: 134/63 (28 May 2025 00:13) (134/63 - 161/83)  BP(mean): --  RR: 19 (28 May 2025 00:13) (19 - 20)  SpO2: 96% (28 May 2025 00:13) (95% - 98%)    Parameters below as of 28 May 2025 00:13  Patient On (Oxygen Delivery Method): room air        Tele:    General: No distress. Comfortable.  HEENT: EOM intact.  Neck: Neck supple. JVP not elevated. No masses  Chest: Clear to auscultation bilaterally  CV: Normal S1 and S2. No murmurs, rub, or gallops. Radial pulses normal.  Abdomen: Soft, non-distended, non-tender  Skin: No rashes or skin breakdown  Neurology: Alert and oriented times three. Sensation intact  Psych: Affect normal    LABS:                        12.7   10.52 )-----------( 265      ( 27 May 2025 21:43 )             37.7     05-27    135  |  99  |  19  ----------------------------<  115[H]  4.4   |  24  |  0.74    Ca    9.8      27 May 2025 21:43    TPro  6.9  /  Alb  4.3  /  TBili  0.4  /  DBili  x   /  AST  13  /  ALT  7[L]  /  AlkPhos  75  05-27    PT/INR - ( 27 May 2025 21:43 )   PT: 11.3 sec;   INR: 0.98 ratio         PTT - ( 27 May 2025 21:43 )  PTT:27.0 sec  Urinalysis Basic - ( 27 May 2025 21:43 )    Color: x / Appearance: x / SG: x / pH: x  Gluc: 115 mg/dL / Ketone: x  / Bili: x / Urobili: x   Blood: x / Protein: x / Nitrite: x   Leuk Esterase: x / RBC: x / WBC x   Sq Epi: x / Non Sq Epi: x / Bacteria: x        RADIOLOGY & ADDITIONAL STUDIES: HPI:  80 year old woman with allergic rhinitis who is presenting for severe throat pain. She feels like her secretions are stuck in her throat. Has cough for several days. Has been unable to eat/drink since 9 AM 5/27. Hospitalized in 2023 for supraglottic edema.    ED: 10 IV decadron, unasyn, and 1.6 L IVF. Scoped by ENT twice, showing swelling of arytenoids with improvement of swelling on second evaluation.       PAST MEDICAL & SURGICAL HISTORY:  No pertinent past medical history      No significant past surgical history          Review of Systems:  14 point ROS done and found to be negative or noncontributory other than noted in HPI.    MEDICATIONS  (STANDING):  ampicillin/sulbactam  IVPB 3 Gram(s) IV Intermittent every 6 hours  ampicillin/sulbactam  IVPB      dexAMETHasone  IVPB 10 milliGRAM(s) IV Intermittent every 8 hours  fluticasone propionate 50 MICROgram(s)/spray Nasal Spray 1 Spray(s) Both Nostrils two times a day  sodium chloride 0.65% Nasal 1 Spray(s) Both Nostrils four times a day    MEDICATIONS  (PRN):      HOME MEDICATIONS:    Allergies    No Known Allergies    Intolerances        SOCIAL HISTORY:    FAMILY HISTORY:  FH: type 2 diabetes (Father)        Vital Signs Last 24 Hrs  T(C): 36.7 (28 May 2025 00:13), Max: 36.7 (28 May 2025 00:13)  T(F): 98 (28 May 2025 00:13), Max: 98 (28 May 2025 00:13)  HR: 99 (28 May 2025 00:13) (71 - 99)  BP: 134/63 (28 May 2025 00:13) (134/63 - 161/83)  BP(mean): --  RR: 19 (28 May 2025 00:13) (19 - 20)  SpO2: 96% (28 May 2025 00:13) (95% - 98%)    Parameters below as of 28 May 2025 00:13  Patient On (Oxygen Delivery Method): room air        Tele:    General: No distress. Comfortable.  HEENT: EOM intact.  Neck: Neck supple. JVP not elevated. No masses  Chest: Clear to auscultation bilaterally  CV: Normal S1 and S2. No murmurs, rub, or gallops. Radial pulses normal.  Abdomen: Soft, non-distended, non-tender  Skin: No rashes or skin breakdown  Neurology: Alert and oriented times three. Sensation intact  Psych: Affect normal    LABS:                        12.7   10.52 )-----------( 265      ( 27 May 2025 21:43 )             37.7     05-27    135  |  99  |  19  ----------------------------<  115[H]  4.4   |  24  |  0.74    Ca    9.8      27 May 2025 21:43    TPro  6.9  /  Alb  4.3  /  TBili  0.4  /  DBili  x   /  AST  13  /  ALT  7[L]  /  AlkPhos  75  05-27    PT/INR - ( 27 May 2025 21:43 )   PT: 11.3 sec;   INR: 0.98 ratio         PTT - ( 27 May 2025 21:43 )  PTT:27.0 sec  Urinalysis Basic - ( 27 May 2025 21:43 )    Color: x / Appearance: x / SG: x / pH: x  Gluc: 115 mg/dL / Ketone: x  / Bili: x / Urobili: x   Blood: x / Protein: x / Nitrite: x   Leuk Esterase: x / RBC: x / WBC x   Sq Epi: x / Non Sq Epi: x / Bacteria: x        RADIOLOGY & ADDITIONAL STUDIES:  Personally reviewed.    < from: CT Neck Soft Tissue w/ IV Cont (05.27.25 @ 22:39) >  IMPRESSION:    No radiopaque foreign body within the visualized aerodigestive tract;   presence of intravascular contrast limits evaluation for foreign body.    Supraglottitis. Similar findings were seen on prior exam from 1/29/2023.    Mild nonspecific thickening of the proximal esophageal walls.    < end of copied text >   HPI:  80 year old woman with allergic rhinitis who is presenting for severe throat pain. She feels like her secretions are stuck in her throat. Has cough for several days. Has been unable to eat/drink since 9 AM 5/27. Hospitalized in 2023 for supraglottic edema.    ED: 10 IV decadron, unasyn, and 1.6 L IVF. Scoped by ENT twice, showing swelling of arytenoids with improvement of swelling on second evaluation.       PAST MEDICAL & SURGICAL HISTORY:  No pertinent past medical history      No significant past surgical history          Review of Systems:  14 point ROS done and found to be negative or noncontributory other than noted in HPI.    MEDICATIONS  (STANDING):  ampicillin/sulbactam  IVPB 3 Gram(s) IV Intermittent every 6 hours  ampicillin/sulbactam  IVPB      dexAMETHasone  IVPB 10 milliGRAM(s) IV Intermittent every 8 hours  fluticasone propionate 50 MICROgram(s)/spray Nasal Spray 1 Spray(s) Both Nostrils two times a day  sodium chloride 0.65% Nasal 1 Spray(s) Both Nostrils four times a day    MEDICATIONS  (PRN):      HOME MEDICATIONS:    Allergies    No Known Allergies    Intolerances        SOCIAL HISTORY:    FAMILY HISTORY:  FH: type 2 diabetes (Father)        Vital Signs Last 24 Hrs  T(C): 36.7 (28 May 2025 00:13), Max: 36.7 (28 May 2025 00:13)  T(F): 98 (28 May 2025 00:13), Max: 98 (28 May 2025 00:13)  HR: 99 (28 May 2025 00:13) (71 - 99)  BP: 134/63 (28 May 2025 00:13) (134/63 - 161/83)  BP(mean): --  RR: 19 (28 May 2025 00:13) (19 - 20)  SpO2: 96% (28 May 2025 00:13) (95% - 98%)    Parameters below as of 28 May 2025 00:13  Patient On (Oxygen Delivery Method): room air        Tele:    General: NAD  Neck: Neck supple. JVP not elevated.  Chest: Clear to auscultation bilaterally. No inspiratory stridor.   CV: Normal S1 and S2. No murmurs, rub, or gallops.   Abdomen: Soft, non-distended, non-tender  Neurology: Answering questions appropriately.   Psych: Affect normal      LABS:                        12.7   10.52 )-----------( 265      ( 27 May 2025 21:43 )             37.7     05-27    135  |  99  |  19  ----------------------------<  115[H]  4.4   |  24  |  0.74    Ca    9.8      27 May 2025 21:43    TPro  6.9  /  Alb  4.3  /  TBili  0.4  /  DBili  x   /  AST  13  /  ALT  7[L]  /  AlkPhos  75  05-27    PT/INR - ( 27 May 2025 21:43 )   PT: 11.3 sec;   INR: 0.98 ratio         PTT - ( 27 May 2025 21:43 )  PTT:27.0 sec  Urinalysis Basic - ( 27 May 2025 21:43 )    Color: x / Appearance: x / SG: x / pH: x  Gluc: 115 mg/dL / Ketone: x  / Bili: x / Urobili: x   Blood: x / Protein: x / Nitrite: x   Leuk Esterase: x / RBC: x / WBC x   Sq Epi: x / Non Sq Epi: x / Bacteria: x        RADIOLOGY & ADDITIONAL STUDIES:  Personally reviewed.    < from: CT Neck Soft Tissue w/ IV Cont (05.27.25 @ 22:39) >  IMPRESSION:    No radiopaque foreign body within the visualized aerodigestive tract;   presence of intravascular contrast limits evaluation for foreign body.    Supraglottitis. Similar findings were seen on prior exam from 1/29/2023.    Mild nonspecific thickening of the proximal esophageal walls.    < end of copied text >

## 2025-05-28 NOTE — H&P ADULT - PROBLEM SELECTOR PLAN 3
Recommended outpatient follow up for allergy testing  - Flonase nasal spray 1 spray into each nostril BID  - Nasal saline 1 spray into each nostril TID

## 2025-05-28 NOTE — H&P ADULT - PROBLEM SELECTOR PLAN 4
Dr. Rosario Breeding' calling to request pt's path report and op note please be faxed to them. Everything faxed per their request.  Confirmation received.
1ppd smoker  - Advised patient to stop smoking  - Pt desired cessation aid  - Nicotine patch

## 2025-05-28 NOTE — CONSULT NOTE ADULT - ASSESSMENT
80 year old woman with allergic rhinitis who is presenting for severe throat pain, found to have supraglottic swelling. MICU consulted with concerns for airway.    #Supraglottic swelling  Patient with supraglottic swelling with noted improvement by ENT on repeat evaluation. Likely viral URI as etiology and previous history of supraglottic swelling. On exam patient protecting airway and able to tolerate own secretions.   - c/w decadron 10 mg IV Q8H  - c/w unasyn  - Telemetry/ monitoring  - NPO  - f/u ENT  Does not require MICU level care at this time      Herman Koehler MD  PGY-3

## 2025-05-28 NOTE — H&P ADULT - PROBLEM SELECTOR PLAN 1
Seen on CT  - ENT following, will re-scope in AM  - NPO  - Swallow eval  - Continuous pulse Ox monitoring   - Low threshold for MICU consult or intubation if pt develop shortness of breath or stridor   - Decadron 10mg IV Q8H for 48 hours.   - Pepcid 40mg IV BID   - Benadryl 25mg IV BID  - F/u flu/covid/rsv and consider Full RVP  - F/u blood cx

## 2025-05-28 NOTE — SWALLOW BEDSIDE ASSESSMENT ADULT - SLP GENERAL OBSERVATIONS
Pt encountered in ED in stretcher, awake/alert, on room air. Ox2-3, somewhat confused. Able to follow simple directives given cues. Slightly hoarse vocal quality; appears to be managing secretions.

## 2025-05-28 NOTE — SWALLOW BEDSIDE ASSESSMENT ADULT - SWALLOW EVAL: PATIENT/FAMILY GOALS STATEMENT
Pt wants to eat and drink, states throat feels improved from initial admission but still not baseline

## 2025-05-28 NOTE — SWALLOW BEDSIDE ASSESSMENT ADULT - SWALLOW EVAL: DIAGNOSIS
81 y/o female found to have supraglottitis; improving as per ENT re-scope. Pt presents with evidence of a pharyngeal dysphagia, likely acute. With thin liquids, swallow function notable for multiple swallows consistent with pharyngeal residue and immediate/ significant coughing episode post-swallow, concerning for aspiration/ laryngeal penetration. Will plan for instrumental exam tomorrow for further assessment pending continued medical management of supraglottitis.

## 2025-05-28 NOTE — H&P ADULT - HISTORY OF PRESENT ILLNESS
80F w/Hx of vertigo, seasonal allergies and 1ppd smoking presents due to over 1 week of cough and sore throat that progressed the last few days into difficulty swallowing. Patient had similar situation in 2023 that resolved. Patient reports she lives with 2 cats. Denies fever, sick contacts. Reports she takes allegra daily because she gets red splotchy rashes if she does not take it. She had a formal allergy test years ago that was only positive for cockroaches at that time, and she has not had a reassessment since. She denies eating any out of ordinary foods. Unsure of any possible triggers. Patient also reports bouts of vertigo about once a month that resolve on their own or with medication. Patient denies chest pain, SOB, headache, dizziness, abd pain, nausea, vomiting, constipation, dysuria.

## 2025-05-28 NOTE — CONSULT NOTE ADULT - ATTENDING COMMENTS
Patient seen and examined.  Agree with resident note as above.  Patient with hx as noted including a previous episode of supraglottitis treated with steroids and abx who presented to the ER with 2 weeks of cough, and then today worsening runny nose/secretions/inability to swallow secretions/hoarse voice.  In the ER patient was examined by ENT and had CT c/w supraglottitis.  Subsequent ENT scope shows some improvement in supraglottic edema.    On MICU eval patient is awake and alert, is swallowing secretions with some discomfort.  Not drooling. Phonating well and can speak in full sentences.  Not dyspneic.  No stridor.  Good air movement and clear bilaterally.    Recs as above and I have edited as appropriate- continued ENT f/u to ensure continued improvement.  Steroids and abx as per ENT.      There is no indication for MICU admission.  If patient's airway edema progresses such that it requires ICU admission, SICU would be most appropriate given that the monitoring is to continually assess the need for a protected and possibly surgical airway.      Discussed with patient and ER PA/attending.

## 2025-05-28 NOTE — PROGRESS NOTE ADULT - ASSESSMENT
81 y/o, F, with history of seasonal allergies, presents to ED for 2 day history severe throat pain and difficulty swallowing secretion. ENT consulted for airway evaluation. Per patient, she had similar symptoms 2 years ago, but not as worse. Pt c/o gurgling sound in her throat. On initial laryngoscopy found to have moderate to severe bilateral arytenoid edema, pooling of secretions but has been comfortable on room air. Pt has been on unasyn and decadron 10mg q8 hrs. Repeat laryngoscopy last night showed improving b/l arytenoid edema and pooling of secretions.     This morning pt states she does not have the gurgling sound in her throat. On exam, neck mildly TTP. Repeat laryngoscopy showed b/l arytenoid edema and pooling of secretions continuing to improve, airway patent. Pt comfortable and O2 sat 98% on room air.  81 y/o, F, with history of seasonal allergies, presents to ED for 2 day history severe throat pain and difficulty swallowing secretion. ENT consulted for airway evaluation. Per patient, she had similar symptoms 2 years ago, but not as worse. Pt c/o gurgling sound in her throat. On initial laryngoscopy found to have moderate to severe bilateral arytenoid edema, pooling of secretions but has been comfortable on room air. Pt has been on unasyn and decadron 10mg q8 hrs. Repeat laryngoscopy last night showed improving b/l arytenoid edema and pooling of secretions.     This morning pt states she does not have the gurgling sound in her throat. On exam, neck mildly TTP. Repeat laryngoscopy showed b/l arytenoid edema R>L and pooling of secretions continuing to improve, airway patent. Pt comfortable and O2 sat 98% on room air.

## 2025-05-28 NOTE — ED ADULT NURSE NOTE - NSICDXFAMILYHX_GEN_ALL_CORE_FT
FLUoxetine (PROZAC) 20 MG capsule       Last Written Prescription Date:  11-22-22  Last Fill Quantity: 90,   # refills: 3  Last Office Visit : 7-17-23  Future Office visit:  7-1-24    Routing refill request to provider for review/approval because:  Associated diagnosis not on protocol  PMDD (premenstrual dysphoric disorder) [F32.81]      No PHQ9, ? If required  Gap in RF    Patient Comment: [place] to leave the country [place] next week so need this filled prior    FAMILY HISTORY:  Father  Still living? Unknown  FH: type 2 diabetes, Age at diagnosis: Age Unknown

## 2025-05-28 NOTE — PROGRESS NOTE ADULT - PROBLEM SELECTOR PLAN 1
Seen on CT  - ENT following, plan to re-scope in AM  - NPO for now per S/S; monitor FS q6 while NPO   - Low threshold for MICU consult or intubation if pt develop shortness of breath or stridor   - Decadron 10mg IV Q8H for 48 hours   - Pepcid 40mg IV BID   - Benadryl 25mg IV BID  - c/w continuous pulse Ox monitoring   - flu/covid/rsv neg  - F/u blood cx  - continue with unasyn and add on CTX per ENT

## 2025-05-28 NOTE — H&P ADULT - NSHPPHYSICALEXAM_GEN_ALL_CORE
T(C): 36.6 (05-28-25 @ 05:02), Max: 36.7 (05-28-25 @ 00:13)  HR: 84 (05-28-25 @ 05:02) (71 - 99)  BP: 114/70 (05-28-25 @ 05:02) (114/70 - 161/83)  RR: 16 (05-28-25 @ 05:02) (16 - 20)  SpO2: 97% (05-28-25 @ 05:02) (95% - 98%)    CONSTITUTIONAL: No apparent distress  EYES: PERRLA and symmetric, EOMI, No conjunctival or scleral injection, non-icteric  ENMT: Oral mucosa with moist membranes.  NECK: Supple, symmetric. No JVD. Tender to touch.  RESP: No respiratory distress, no use of accessory muscles; CTA b/l, no WRR  CV: RRR, +S1S2, no MRG  GI: Soft, NT, ND, no rebound, no guarding  : No suprapubic tenderness. No CVA tenderness.  LYMPH: No cervical LAD or tenderness  MSK: No spinal tenderness, normal muscle strength/tone  EXTREMITIES: No pedal edema  SKIN: No rashes or ulcers noted  NEURO: A+Ox3, responsive. No tremor  PSYCH: Appropriate insight/judgment; mood and affect appropriate

## 2025-05-28 NOTE — H&P ADULT - [id number]
History  Chief Complaint   Patient presents with    Abdominal Pain Pregnant     Pt presents to ED with spotting and cramping  Pt is 11 weeks pregnant     Patient is a 72-year-old female, , currently 11 weeks pregnant by last menstrual period which was on 18, presents to the emergency department complaining of pelvic cramping and vaginal bleeding  Patient states she started with light vaginal bleeding yesterday which has progressively picked up throughout today  She states it is slightly less than a period but more than spotting and she has passed small blood clots  She also reports bilateral lower abdominal/pelvic cramping that comes and goes  According to medical records, patient was seen here on  for vaginal bleeding at which time she was around 6 weeks pregnant  She had a formal pelvic ultrasound that confirmed an IUP and showed fetal heart rate of 107  Blood type was checked at that visit and she was O+  She denies any further bleeding since that episode  She has followed up with her Ob/Gyn since then but has not had another ultrasound since her initial ED visit  She states she is supposed to see her OBGYN this coming Tuesday  She reports 2 days ago was the last time she had any sexual intercourse  She denies any other symptoms or complaints at this time and rest of review of systems negative  No fever, chills, headache, dizziness or lightheadedness, syncope, URI symptoms, cough, chest pain, palpitations, dyspnea, upper abdominal pain, back pain, nausea, vomiting, diarrhea, constipation, dysuria, change in urinary frequency, hematuria, flank pain, skin rash or color change, extremity weakness or paresthesia or other focal neurologic deficits  History provided by:  Patient   used: No        Prior to Admission Medications   Prescriptions Last Dose Informant Patient Reported? Taking?    azithromycin (ZITHROMAX) 1 g powder   Yes No   Sig: TAKE 1 PACKET (1 G TOTAL) BY MOUTH ONCE FOR 1 DOSE  cephalexin (KEFLEX) 250 mg/5 mL suspension   Yes No   Sig: TAKE 10 ML (500 MG TOTAL) BY MOUTH EVERY 8 (EIGHT) HOURS FOR 7 DAYS  DISCARD ANY REMAINDER   fluticasone (CUTIVATE) 0 05 % cream   No No   Sig: Apply topically 2 (two) times a day   ondansetron (ZOFRAN) 4 mg tablet   Yes No   Sig: TAKE 1 TABLET (4 MG TOTAL) BY MOUTH ONCE FOR 1 DOSE  Facility-Administered Medications: None       History reviewed  No pertinent past medical history  History reviewed  No pertinent surgical history  Family History   Problem Relation Age of Onset    Diabetes Maternal Grandfather     Hypertension Maternal Grandfather     Diabetes Paternal Grandmother     Hypertension Paternal Grandmother     Cancer Paternal Grandmother     Cancer Paternal Grandfather      I have reviewed and agree with the history as documented  Social History   Substance Use Topics    Smoking status: Current Some Day Smoker     Packs/day: 0 50     Types: Cigarettes    Smokeless tobacco: Never Used    Alcohol use No      Comment: social        Review of Systems   Constitutional: Negative for chills, diaphoresis and fever  HENT: Negative for congestion, ear pain, rhinorrhea and sore throat  Eyes: Negative for pain and visual disturbance  Respiratory: Negative for cough, chest tightness, shortness of breath and wheezing  Cardiovascular: Negative for chest pain and palpitations  Gastrointestinal: Negative for abdominal pain, blood in stool, constipation, diarrhea, nausea and vomiting  Genitourinary: Positive for pelvic pain and vaginal bleeding  Negative for dysuria, flank pain, frequency, hematuria and vaginal pain  Musculoskeletal: Negative for back pain and neck pain  Skin: Negative for color change, pallor and rash  Allergic/Immunologic: Negative for immunocompromised state  Neurological: Negative for dizziness, syncope, weakness, light-headedness, numbness and headaches  Hematological: Negative for adenopathy  Does not bruise/bleed easily  Psychiatric/Behavioral: Negative for confusion and decreased concentration  All other systems reviewed and are negative  Physical Exam  ED Triage Vitals [04/06/18 2320]   Temperature Pulse Respirations Blood Pressure SpO2   98 4 °F (36 9 °C) 78 17 125/82 100 %      Temp Source Heart Rate Source Patient Position - Orthostatic VS BP Location FiO2 (%)   Oral Monitor Sitting Left arm --      Pain Score       6           Orthostatic Vital Signs  Vitals:    04/06/18 2320   BP: 125/82   Pulse: 78   Patient Position - Orthostatic VS: Sitting       Physical Exam   Constitutional: She is oriented to person, place, and time  She appears well-developed and well-nourished  No distress  HENT:   Head: Normocephalic and atraumatic  Mouth/Throat: Oropharynx is clear and moist    Eyes: Conjunctivae and EOM are normal  Pupils are equal, round, and reactive to light  Neck: Normal range of motion  Neck supple  No JVD present  Cardiovascular: Normal rate, regular rhythm, normal heart sounds and intact distal pulses  Exam reveals no gallop and no friction rub  No murmur heard  Pulmonary/Chest: Effort normal and breath sounds normal  No respiratory distress  She has no wheezes  She has no rales  Abdominal: Soft  Bowel sounds are normal  She exhibits no distension  There is no tenderness  There is no rebound and no guarding  Musculoskeletal: Normal range of motion  She exhibits no edema or tenderness  Neurological: She is alert and oriented to person, place, and time  No gross motor or sensory deficits  Skin: Skin is warm and dry  No rash noted  She is not diaphoretic  No erythema  No pallor  Psychiatric: She has a normal mood and affect  Her behavior is normal    Nursing note and vitals reviewed        ED Medications  Medications - No data to display    Diagnostic Studies  Results Reviewed     Procedure Component Value Units Date/Time Quantitative hCG [[id number]]  (Abnormal) Collected:  04/07/18 0035    Lab Status:  Final result Specimen:  Blood from Arm, Right Updated:  04/07/18 0122     HCG, Quant 8,181 (H) mIU/mL     Narrative:          Expected Ranges:     Approximate               Approximate HCG  Gestation age          Concentration ( mIU/mL)  _____________          ______________________   Rey Sanchez                      HCG values  0 2-1                       5-50  1-2                           2-3                         100-5000  3-4                         500-16412  4-5                         1000-81254  5-6                         [id number]  6-8                         [id number]  8-12                        25728-440031    Urine Microscopic [[id number]]  (Abnormal) Collected:  04/07/18 0035    Lab Status:  Final result Specimen:  Urine from Urine, Clean Catch Updated:  04/07/18 0050     RBC, UA Innumerable (A) /hpf      WBC, UA None Seen /hpf      Epithelial Cells Occasional /hpf      Bacteria, UA None Seen /hpf     UA w Reflex to Microscopic [[id number]]  (Abnormal) Collected:  04/07/18 0035    Lab Status:  Final result Specimen:  Urine from Urine, Clean Catch Updated:  04/07/18 0041     Color, UA Yellow     Clarity, UA Clear     Specific Gravity, UA 1 015     pH, UA 7 0     Leukocytes, UA Negative     Nitrite, UA Negative     Protein, UA Negative mg/dl      Glucose, UA Negative mg/dl      Ketones, UA Negative mg/dl      Urobilinogen, UA 0 2 E U /dl      Bilirubin, UA Negative     Blood, UA Large (A)                 No orders to display              Procedures  Pelvic Ultrasound  Performed by: Mona Clark  Authorized by: Mona Clark     Procedure date/time:  4/7/2018 12:38 AM  Patient location:  ED  Other Assisting Provider: No    Procedure details:     Indications: pregnant with vaginal bleeding      Assess:  Fetal viability    Technique:  Transabdominal obstetric (limited) exam  Uterine findings:     Intrauterine pregnancy: identified      Single gestation: identified      Gestational sac: identified      Fetal pole: identified      Fetal heart rate: not identified    Other findings:     Free pelvic fluid: not identified             Phone Contacts  ED Phone Contact    ED Course  ED Course as of Apr 07 0137   Sat Apr 07, 2018   0052 Bacteria, UA: None Seen   0059 Repeated transabdominal pelvic ultrasound after patient emptied bladder, and IUP seen however still unable to obtain fetal heartbeat  0049 Updated patient about her beta quant level being elevated from prior however not as elevated as we would expect 11 weeks  Told her that she likely is miscarrying however cannot be 100 percent sure so she should follow up with her OBGYN on Tuesday  Discussed ED return parameters including worsening or uncontrolled pain, worsening bleeding to the point where she is having symptoms of anemia which I discussed with patient but those symptoms are  Patient stable for discharge at this time  MDM  Number of Diagnoses or Management Options  Diagnosis management comments: 28-year-old female, currently 11 weeks pregnant, with confirmed IUP on ultrasound from 03/07, presents with pelvic pain and vaginal bleeding since last night  Differential includes threatened miscarriage, incomplete miscarriage, subchorionic hemorrhage  Given that patient already had formal ultrasound ruling out ectopic I do not feel repeat formal ultrasound is necessary  Bedside ultrasound showed intrauterine pregnancy with fetal pole however unable to obtain fetal heartbeat  I did not see a flicker or any evidence of heartbeat  Will check beta quant to see if levels are decreasing or increasing and will also check UA  Patient has an appointment with her OBGYN on Tuesday  Advised her to keep this appointment for repeat ultrasound at that time    Discussed with her that she may be miscarrying and discussed ED return parameters including worsening/uncontrolled pain or heavy vaginal bleeding leading to lightheadedness, syncope, palpitations, dyspnea or skin pallor  Amount and/or Complexity of Data Reviewed  Clinical lab tests: ordered and reviewed      CritCare Time    Disposition  Final diagnoses:   Threatened miscarriage   Pelvic cramping   Vaginal bleeding before 22 weeks gestation     Time reflects when diagnosis was documented in both MDM as applicable and the Disposition within this note     Time User Action Codes Description Comment    4/7/2018  1:36 AM Gabrielle TONEY Add [O20 0] Threatened miscarriage     4/7/2018  1:36 AM Gabrielle TONEY Add [R10 2] Pelvic cramping     4/7/2018  1:36 AM Jodean Neighbors E Add [O20 9] Vaginal bleeding before 22 weeks gestation       ED Disposition     ED Disposition Condition Comment    Discharge  Roby Vega discharge to home/self care  Condition at discharge: Stable        Follow-up Information     Follow up With Specialties Details Why Contact Info Additional Information    Ob/Gyn  Go on 4/10/2018       [address] [facility] Emergency Department Emergency Medicine Go to If symptoms worsen [address] [facility] [address], [address], [place], [address]        Patient's Medications   Discharge Prescriptions    No medications on file     No discharge procedures on file      ED Provider  Electronically Signed by           Cristina Holley DO  04/07/18 7670 Offered and provided

## 2025-05-28 NOTE — PROGRESS NOTE ADULT - SUBJECTIVE AND OBJECTIVE BOX
ENT ISSUE: nonobstructive supraglottitis    HPI: 79 y/o, F, with history of seasonal allergies, presents to ED for 2 day history severe throat pain and difficulty swallowing secretion. ENT consulted for airway evaluation. Per patient, she had similar symptoms 2 years ago, but not as worse. Pt c/o gurgling sound in her throat. On initial laryngoscopy found to have moderate to severe bilateral arytenoid edema, pooling of secretions but has been comfortable on room air. Pt has been on unasyn and decadron 10mg q8 hrs. Repeat laryngoscopy last night showed improving b/l arytenoid edema and pooling of secretions. This morning pt states she does not have the gurgling sound in her throat. Denies fever, N/V, dysphagia, odynophagia, dysphonia, SOB, dyspnea, changes in voice or inability to tolerate secretions.           PAST MEDICAL & SURGICAL HISTORY:  No pertinent past medical history      No significant past surgical history        Allergies    No Known Allergies    Intolerances      MEDICATIONS  (STANDING):  ampicillin/sulbactam  IVPB 3 Gram(s) IV Intermittent every 6 hours  ampicillin/sulbactam  IVPB      cefTRIAXone   IVPB 1000 milliGRAM(s) IV Intermittent every 24 hours  dexAMETHasone  IVPB 10 milliGRAM(s) IV Intermittent every 8 hours  diphenhydrAMINE Injectable 25 milliGRAM(s) IV Push every 12 hours  famotidine Injectable 40 milliGRAM(s) IV Push every 12 hours  fluticasone propionate 50 MICROgram(s)/spray Nasal Spray 1 Spray(s) Both Nostrils two times a day  nicotine -  14 mG/24Hr(s) Patch 1 Patch Transdermal daily  sodium chloride 0.65% Nasal 1 Spray(s) Both Nostrils four times a day    MEDICATIONS  (PRN):        Social History: see consult    Family history: see consult      ROS:   ENT: all negative except as noted in HPI   Pulm: denies SOB, cough, hemoptysis  Neuro: denies numbness/tingling, loss of sensation  Endo: denies heat/cold intolerance, excessive sweating      Vital Signs Last 24 Hrs  T(C): 36.6 (28 May 2025 05:02), Max: 36.7 (28 May 2025 00:13)  T(F): 97.8 (28 May 2025 05:02), Max: 98 (28 May 2025 00:13)  HR: 84 (28 May 2025 05:02) (71 - 99)  BP: 114/70 (28 May 2025 05:02) (114/70 - 161/83)  BP(mean): --  RR: 16 (28 May 2025 05:02) (16 - 20)  SpO2: 97% (28 May 2025 05:02) (95% - 98%)    Parameters below as of 28 May 2025 05:02  Patient On (Oxygen Delivery Method): room air                              11.9   5.94  )-----------( 279      ( 28 May 2025 07:05 )             36.0    05-28    137  |  101  |  17  ----------------------------<  130[H]  4.3   |  21[L]  |  0.68    Ca    9.2      28 May 2025 07:05    TPro  6.3  /  Alb  3.8  /  TBili  0.5  /  DBili  x   /  AST  12  /  ALT  7[L]  /  AlkPhos  72  05-28   PT/INR - ( 27 May 2025 21:43 )   PT: 11.3 sec;   INR: 0.98 ratio         PTT - ( 27 May 2025 21:43 )  PTT:27.0 sec      PHYSICAL EXAM:  Gen: NAD  Skin: No rashes, bruises, or lesions  Head: Normocephalic, Atraumatic  Face: no edema, erythema, or fluctuance. Parotid glands soft without mass  Eyes: no scleral injection  Nose: Nares bilaterally patent, no discharge  Mouth: No Stridor / Drooling / Trismus.  Mucosa moist, tongue/uvula midline, oropharynx clear  Neck: Flat, supple, no lymphadenopathy, trachea midline, no masses. mild TTP.  Lymphatic: No lymphadenopathy  Resp: breathing easily, no stridor  Neuro: no facial droop        Fiberoptic Flexible Laryngoscopy  Indication: laryngeal edema  Patient was unable to cooperate with mirror.    b/l arytenoid edema R>L improving compared to prior exam. improved pooling of secretions. Nasopharynx, oropharynx, and hypopharynx clear, no bleeding. Tongue base, posterior pharyngeal wall, vallecula, epiglottis, and subglottis appear normal. No erythema, masses or lesions. Airway patent, no foreign body visualized. No glottic edema. True vocal cords, vestibular folds, ventricles, pyriform sinuses, and aryepiglottic folds appear normal bilaterally. Vocal cords mobile with good contact b/l.       ACC: [id number] EXAM: CT NECK SOFT TISSUE IC ORDERED BY: DEEPIKA BOTELLO  PROCEDURE DATE: 05/27/2025  INTERPRETATION: CLINICAL INFORMATION: Foreign body sensation.    TECHNIQUE: Thin section axial CT images are obtained through the soft tissues of the neck after the administration of intravenous contrast. Images are reformatted in sagittal and coronal planes. 90 cc of Omnipaque 300 are administered without event. 10 cc are discarded.    COMPARISON: CT the soft tissues of the neck from 1/29/2023.    FINDINGS:  The visualized portions of the brain are unremarkable. The intraorbital soft tissues are unremarkable. The visualized paranasal sinuses are clear apart from mild bilateral ethmoid mucosal thickening, moderate right maxillary sinus mucosal thickening, and minimal left maxillary sinus mucosal thickening. There are trace bilateral mastoid effusions in under pneumatized mastoid air cells.    Within the confines of this exam, there is no radiopaque foreign body within the visualized aerodigestive tract; presence of intravascular contrast limits evaluation for foreign bodies. There is no significant enlargement of the nasopharyngeal or oropharyngeal lymphoid tissue. There is no evidence of a peritonsillar abscess. There is no retropharyngeal edema or collection. There is supraglottic edema with moderate to severe thickening of the aryepiglottic folds with mild infiltration of the surrounding fat. The vocal cords are opposed. The subglottic airway is patent. There is mild nonspecific thickening of the proximal esophageal walls.    The parotid glands and submandibular glands are unremarkable. The thyroid gland is unremarkable in appearance.  There is no significant cervical lymphadenopathy.  There are atherosclerotic calcifications at the carotid bifurcations.  The lung apices demonstrate mild emphysema and scarring.  There are degenerative changes of the spine.    IMPRESSION:  No radiopaque foreign body within the visualized aerodigestive tract; presence of intravascular contrast limits evaluation for foreign body.  Supraglottitis. Similar findings were seen on prior exam from 1/29/2023.  Mild nonspecific thickening of the proximal esophageal walls.    --- End of Report ---.   Biopsy Photograph Reviewed: Yes Accession #: [id number] Size Of Lesion In Cm: 0.5 X Size Of Lesion In Cm (Optional): 0 Size Of Margin In Cm: 0.4 Anesthesia Volume In Cc: 9 Was An Eye Clamp Used?: No Eye Clamp Note Details: An eye clamp was used during the procedure. Excision Method: Elliptical Saucerization Depth: dermis and superficial adipose tissue Repair Type: Intermediate Intermediate / Complex Repair - Final Wound Length In Cm: 4 Suturegard Retention Suture: 2-0 Nylon Retention Suture Bite Size: 3 mm Length To Time In Minutes Device Was In Place: 10 Number Of Hemigard Strips Per Side: 1 Undermining Type: Entire Wound Debridement Text: The wound edges were debrided prior to proceeding with the closure to facilitate wound healing. Helical Rim Text: The closure involved the helical rim. Vermilion Border Text: The closure involved the vermilion border. Nostril Rim Text: The closure involved the nostril rim. Retention Suture Text: Retention sutures were placed to support the closure and prevent dehiscence. Suture Removal: 10 days Graft Donor Site Bandage (Optional-Leave Blank If You Don't Want In Note): Steri-strips and a pressure bandage were applied to the donor site. Epidermal Closure Graft Donor Site (Optional): simple interrupted Billing Type: Third-Party Bill Excision Depth: adipose tissue Scalpel Size: 15 blade Anesthesia Type: 1% lidocaine with epinephrine and a 1:10 solution of 8.4% sodium bicarbonate Additional Anesthesia Volume In Cc: 6 Hemostasis: Electrocautery Estimated Blood Loss (Cc): minimal Detail Level: Detailed Deep Sutures: 3-0 Monocryl Epidermal Sutures: 4-0 Ethilon Epidermal Closure: running cuticular Wound Care: Aquaphor Dressing: pressure dressing Suturegard Intro: Intraoperative tissue expansion was performed, utilizing the SUTUREGARD device, in order to reduce wound tension. Suturegard Body: The suture ends were repeatedly re-tightened and re-clamped to achieve the desired tissue expansion. Hemigard Intro: Due to skin fragility and wound tension, it was decided to use HEMIGARD adhesive retention suture devices to permit a linear closure. The skin was cleaned and dried for a 6cm distance away from the wound. Excessive hair, if present, was removed to allow for adhesion. Hemigard Postcare Instructions: The HEMIGARD strips are to remain completely dry for at least 5-7 days. Positioning (Leave Blank If You Do Not Want): The patient was placed in a comfortable position exposing the surgical site. Pre-Excision Curettage Text (Leave Blank If You Do Not Want): Prior to drawing the surgical margin the visible lesion was removed with electrodesiccation and curettage to clearly define the lesion size. Complex Repair Preamble Text (Leave Blank If You Do Not Want): Extensive wide undermining was performed. Intermediate Repair Preamble Text (Leave Blank If You Do Not Want): Undermining was performed with blunt dissection. Curvilinear Excision Additional Text (Leave Blank If You Do Not Want): The margin was drawn around the clinically apparent lesion.  A curvilinear shape was then drawn on the skin incorporating the lesion and margins.  Incisions were then made along these lines to the appropriate tissue plane and the lesion was extirpated. Fusiform Excision Additional Text (Leave Blank If You Do Not Want): The margin was drawn around the clinically apparent lesion.  A fusiform shape was then drawn on the skin incorporating the lesion and margins.  Incisions were then made along these lines to the appropriate tissue plane and the lesion was extirpated. Elliptical Excision Additional Text (Leave Blank If You Do Not Want): The margin was drawn around the clinically apparent lesion.  An elliptical shape was then drawn on the skin incorporating the lesion and margins.  Incisions were then made along these lines to the appropriate tissue plane and the lesion was extirpated. Saucerization Excision Additional Text (Leave Blank If You Do Not Want): The margin was drawn around the clinically apparent lesion.  Incisions were then made along these lines, in a tangential fashion, to the appropriate tissue plane and the lesion was extirpated. Slit Excision Additional Text (Leave Blank If You Do Not Want): A linear line was drawn on the skin overlying the lesion. An incision was made slowly until the lesion was visualized.  Once visualized, the lesion was removed with blunt dissection. Excisional Biopsy Additional Text (Leave Blank If You Do Not Want): The margin was drawn around the clinically apparent lesion. An elliptical shape was then drawn on the skin incorporating the lesion and margins.  Incisions were then made along these lines to the appropriate tissue plane and the lesion was extirpated. Perilesional Excision Additional Text (Leave Blank If You Do Not Want): The margin was drawn around the clinically apparent lesion. Incisions were then made along these lines to the appropriate tissue plane and the lesion was extirpated. Repair Performed By Another Provider Text (Leave Blank If You Do Not Want): After the tissue was excised the defect was repaired by another provider. No Repair - Repaired With Adjacent Surgical Defect Text (Leave Blank If You Do Not Want): After the excision the defect was repaired concurrently with another surgical defect which was in close approximation. Adjacent Tissue Transfer Text: The defect edges were debeveled with a #15 scalpel blade. Given the location of the defect and the proximity to free margins an adjacent tissue transfer was deemed most appropriate. Using a sterile surgical marker, an appropriate flap was drawn incorporating the defect and placing the expected incisions within the relaxed skin tension lines where possible. The area thus outlined was incised deep to adipose tissue with a #15 scalpel blade. The skin margins were undermined to an appropriate distance in all directions utilizing iris scissors and carried over to close the primary defect. Advancement Flap (Single) Text: The defect edges were debeveled with a #15 scalpel blade.  Given the location of the defect and the proximity to free margins a single advancement flap was deemed most appropriate.  Using a sterile surgical marker, an appropriate advancement flap was drawn incorporating the defect and placing the expected incisions within the relaxed skin tension lines where possible.    The area thus outlined was incised deep to adipose tissue with a #15 scalpel blade.  The skin margins were undermined to an appropriate distance in all directions utilizing iris scissors. Advancement Flap (Double) Text: The defect edges were debeveled with a #15 scalpel blade.  Given the location of the defect and the proximity to free margins a double advancement flap was deemed most appropriate.  Using a sterile surgical marker, the appropriate advancement flaps were drawn incorporating the defect and placing the expected incisions within the relaxed skin tension lines where possible.    The area thus outlined was incised deep to adipose tissue with a #15 scalpel blade.  The skin margins were undermined to an appropriate distance in all directions utilizing iris scissors. Burow's Advancement Flap Text: The defect edges were debeveled with a #15 scalpel blade.  Given the location of the defect and the proximity to free margins a Burow's advancement flap was deemed most appropriate.  Using a sterile surgical marker, the appropriate advancement flap was drawn incorporating the defect and placing the expected incisions within the relaxed skin tension lines where possible.    The area thus outlined was incised deep to adipose tissue with a #15 scalpel blade.  The skin margins were undermined to an appropriate distance in all directions utilizing iris scissors. Chonodrocutaneous Helical Advancement Flap Text: The defect edges were debeveled with a #15 scalpel blade. Given the location of the defect and the proximity to free margins a chondrocutaneous helical advancement flap was deemed most appropriate. Using a sterile surgical marker, the appropriate advancement flap was drawn incorporating the defect and placing the expected incisions within the relaxed skin tension lines where possible. The area thus outlined was incised deep to adipose tissue with a #15 scalpel blade. The skin margins were undermined to an appropriate distance in all directions utilizing iris scissors. Following this, the designed flap was advanced and carried over into the primary defect and sutured into place. Crescentic Advancement Flap Text: The defect edges were debeveled with a #15 scalpel blade.  Given the location of the defect and the proximity to free margins a crescentic advancement flap was deemed most appropriate.  Using a sterile surgical marker, the appropriate advancement flap was drawn incorporating the defect and placing the expected incisions within the relaxed skin tension lines where possible.    The area thus outlined was incised deep to adipose tissue with a #15 scalpel blade.  The skin margins were undermined to an appropriate distance in all directions utilizing iris scissors. A-T Advancement Flap Text: The defect edges were debeveled with a #15 scalpel blade.  Given the location of the defect, shape of the defect and the proximity to free margins an A-T advancement flap was deemed most appropriate.  Using a sterile surgical marker, an appropriate advancement flap was drawn incorporating the defect and placing the expected incisions within the relaxed skin tension lines where possible.    The area thus outlined was incised deep to adipose tissue with a #15 scalpel blade.  The skin margins were undermined to an appropriate distance in all directions utilizing iris scissors. O-T Advancement Flap Text: The defect edges were debeveled with a #15 scalpel blade.  Given the location of the defect, shape of the defect and the proximity to free margins an O-T advancement flap was deemed most appropriate.  Using a sterile surgical marker, an appropriate advancement flap was drawn incorporating the defect and placing the expected incisions within the relaxed skin tension lines where possible.    The area thus outlined was incised deep to adipose tissue with a #15 scalpel blade.  The skin margins were undermined to an appropriate distance in all directions utilizing iris scissors. O-L Flap Text: The defect edges were debeveled with a #15 scalpel blade.  Given the location of the defect, shape of the defect and the proximity to free margins an O-L flap was deemed most appropriate.  Using a sterile surgical marker, an appropriate advancement flap was drawn incorporating the defect and placing the expected incisions within the relaxed skin tension lines where possible.    The area thus outlined was incised deep to adipose tissue with a #15 scalpel blade.  The skin margins were undermined to an appropriate distance in all directions utilizing iris scissors. O-Z Flap Text: The defect edges were debeveled with a #15 scalpel blade. Given the location of the defect, shape of the defect and the proximity to free margins an O-Z flap was deemed most appropriate. Using a sterile surgical marker, an appropriate transposition flap was drawn incorporating the defect and placing the expected incisions within the relaxed skin tension lines where possible. The area thus outlined was incised deep to adipose tissue with a #15 scalpel blade. The skin margins were undermined to an appropriate distance in all directions utilizing iris scissors. Following this, the designed flap was carried over into the primary defect and sutured into place. Double O-Z Flap Text: The defect edges were debeveled with a #15 scalpel blade. Given the location of the defect, shape of the defect and the proximity to free margins a Double O-Z flap was deemed most appropriate. Using a sterile surgical marker, an appropriate transposition flap was drawn incorporating the defect and placing the expected incisions within the relaxed skin tension lines where possible. The area thus outlined was incised deep to adipose tissue with a #15 scalpel blade. The skin margins were undermined to an appropriate distance in all directions utilizing iris scissors. Following this, the designed flap was carried over into the primary defect and sutured into place. V-Y Flap Text: The defect edges were debeveled with a #15 scalpel blade.  Given the location of the defect, shape of the defect and the proximity to free margins a V-Y flap was deemed most appropriate.  Using a sterile surgical marker, an appropriate advancement flap was drawn incorporating the defect and placing the expected incisions within the relaxed skin tension lines where possible.    The area thus outlined was incised deep to adipose tissue with a #15 scalpel blade.  The skin margins were undermined to an appropriate distance in all directions utilizing iris scissors. Advancement-Rotation Flap Text: The defect edges were debeveled with a #15 scalpel blade.  Given the location of the defect, shape of the defect and the proximity to free margins an advancement-rotation flap was deemed most appropriate.  Using a sterile surgical marker, an appropriate flap was drawn incorporating the defect and placing the expected incisions within the relaxed skin tension lines where possible. The area thus outlined was incised deep to adipose tissue with a #15 scalpel blade.  The skin margins were undermined to an appropriate distance in all directions utilizing iris scissors. Mercedes Flap Text: The defect edges were debeveled with a #15 scalpel blade.  Given the location of the defect, shape of the defect and the proximity to free margins a Mercedes flap was deemed most appropriate.  Using a sterile surgical marker, an appropriate advancement flap was drawn incorporating the defect and placing the expected incisions within the relaxed skin tension lines where possible. The area thus outlined was incised deep to adipose tissue with a #15 scalpel blade.  The skin margins were undermined to an appropriate distance in all directions utilizing iris scissors. Modified Advancement Flap Text: The defect edges were debeveled with a #15 scalpel blade.  Given the location of the defect, shape of the defect and the proximity to free margins a modified advancement flap was deemed most appropriate.  Using a sterile surgical marker, an appropriate advancement flap was drawn incorporating the defect and placing the expected incisions within the relaxed skin tension lines where possible.    The area thus outlined was incised deep to adipose tissue with a #15 scalpel blade.  The skin margins were undermined to an appropriate distance in all directions utilizing iris scissors. Mucosal Advancement Flap Text: Given the location of the defect, shape of the defect and the proximity to free margins a mucosal advancement flap was deemed most appropriate. Incisions were made with a 15 blade scalpel in the appropriate fashion along the cutaneous vermilion border and the mucosal lip. The remaining actinically damaged mucosal tissue was excised.  The mucosal advancement flap was then elevated to the gingival sulcus with care taken to preserve the neurovascular structures and advanced into the primary defect. Care was taken to ensure that precise realignment of the vermilion border was achieved. Peng Advancement Flap Text: The defect edges were debeveled with a #15 scalpel blade. Given the location of the defect, shape of the defect and the proximity to free margins a Peng advancement flap was deemed most appropriate. Using a sterile surgical marker, an appropriate advancement flap was drawn incorporating the defect and placing the expected incisions within the relaxed skin tension lines where possible. The area thus outlined was incised deep to adipose tissue with a #15 scalpel blade. The skin margins were undermined to an appropriate distance in all directions utilizing iris scissors. Following this, the designed flap was advanced and carried over into the primary defect and sutured into place. Hatchet Flap Text: The defect edges were debeveled with a #15 scalpel blade.  Given the location of the defect, shape of the defect and the proximity to free margins a hatchet flap was deemed most appropriate.  Using a sterile surgical marker, an appropriate hatchet flap was drawn incorporating the defect and placing the expected incisions within the relaxed skin tension lines where possible.    The area thus outlined was incised deep to adipose tissue with a #15 scalpel blade.  The skin margins were undermined to an appropriate distance in all directions utilizing iris scissors. Rotation Flap Text: The defect edges were debeveled with a #15 scalpel blade.  Given the location of the defect, shape of the defect and the proximity to free margins a rotation flap was deemed most appropriate.  Using a sterile surgical marker, an appropriate rotation flap was drawn incorporating the defect and placing the expected incisions within the relaxed skin tension lines where possible.    The area thus outlined was incised deep to adipose tissue with a #15 scalpel blade.  The skin margins were undermined to an appropriate distance in all directions utilizing iris scissors. Bilateral Rotation Flap Text: The defect edges were debeveled with a #15 scalpel blade. Given the location of the defect, shape of the defect and the proximity to free margins a bilateral rotation flap was deemed most appropriate. Using a sterile surgical marker, an appropriate rotation flap was drawn incorporating the defect and placing the expected incisions within the relaxed skin tension lines where possible. The area thus outlined was incised deep to adipose tissue with a #15 scalpel blade. The skin margins were undermined to an appropriate distance in all directions utilizing iris scissors. Following this, the designed flap was carried over into the primary defect and sutured into place. Spiral Flap Text: The defect edges were debeveled with a #15 scalpel blade.  Given the location of the defect, shape of the defect and the proximity to free margins a spiral flap was deemed most appropriate.  Using a sterile surgical marker, an appropriate rotation flap was drawn incorporating the defect and placing the expected incisions within the relaxed skin tension lines where possible. The area thus outlined was incised deep to adipose tissue with a #15 scalpel blade.  The skin margins were undermined to an appropriate distance in all directions utilizing iris scissors. Staged Advancement Flap Text: The defect edges were debeveled with a #15 scalpel blade. Given the location of the defect, shape of the defect and the proximity to free margins a staged advancement flap was deemed most appropriate. Using a sterile surgical marker, an appropriate advancement flap was drawn incorporating the defect and placing the expected incisions within the relaxed skin tension lines where possible. The area thus outlined was incised deep to adipose tissue with a #15 scalpel blade. The skin margins were undermined to an appropriate distance in all directions utilizing iris scissors. Following this, the designed flap was carried over into the primary defect and sutured into place. Star Wedge Flap Text: The defect edges were debeveled with a #15 scalpel blade.  Given the location of the defect, shape of the defect and the proximity to free margins a star wedge flap was deemed most appropriate.  Using a sterile surgical marker, an appropriate rotation flap was drawn incorporating the defect and placing the expected incisions within the relaxed skin tension lines where possible. The area thus outlined was incised deep to adipose tissue with a #15 scalpel blade.  The skin margins were undermined to an appropriate distance in all directions utilizing iris scissors. Transposition Flap Text: The defect edges were debeveled with a #15 scalpel blade.  Given the location of the defect and the proximity to free margins a transposition flap was deemed most appropriate.  Using a sterile surgical marker, an appropriate transposition flap was drawn incorporating the defect.    The area thus outlined was incised deep to adipose tissue with a #15 scalpel blade.  The skin margins were undermined to an appropriate distance in all directions utilizing iris scissors. Muscle Hinge Flap Text: The defect edges were debeveled with a #15 scalpel blade.  Given the size, depth and location of the defect and the proximity to free margins a muscle hinge flap was deemed most appropriate.  Using a sterile surgical marker, an appropriate hinge flap was drawn incorporating the defect. The area thus outlined was incised with a #15 scalpel blade.  The skin margins were undermined to an appropriate distance in all directions utilizing iris scissors. Mustarde Flap Text: The defect edges were debeveled with a #15 scalpel blade.  Given the size, depth and location of the defect and the proximity to free margins a Mustarde flap was deemed most appropriate. Using a sterile surgical marker, an appropriate flap was drawn incorporating the defect. The area thus outlined was incised with a #15 scalpel blade. The skin margins were undermined to an appropriate distance in all directions utilizing iris scissors. Following this, the designed flap was carried into the primary defect and sutured into place. Nasal Turnover Hinge Flap Text: The defect edges were debeveled with a #15 scalpel blade.  Given the size, depth, location of the defect and the defect being full thickness a nasal turnover hinge flap was deemed most appropriate. Using a sterile surgical marker, an appropriate hinge flap was drawn incorporating the defect. The area thus outlined was incised with a #15 scalpel blade. The flap was designed to recreate the nasal mucosal lining and the alar rim. The skin margins were undermined to an appropriate distance in all directions utilizing iris scissors. Following this, the designed flap was carried over into the primary defect and sutured into place Nasalis-Muscle-Based Myocutaneous Island Pedicle Flap Text: Using a #15 blade, an incision was made around the donor flap to the level of the nasalis muscle. Wide lateral undermining was then performed in both the subcutaneous plane above the nasalis muscle, and in a submuscular plane just above periosteum. This allowed the formation of a free nasalis muscle axial pedicle (based on the angular artery) which was still attached to the actual cutaneous flap, increasing its mobility and vascular viability. Hemostasis was obtained with pinpoint electrocoagulation. The flap was mobilized into position and the pivotal anchor points positioned and stabilized with buried interrupted sutures. Subcutaneous and dermal tissues were closed in a multilayered fashion with sutures. Tissue redundancies were excised, and the epidermal edges were apposed without significant tension and sutured with sutures. Orbicularis Oris Muscle Flap Text: The defect edges were debeveled with a #15 scalpel blade.  Given that the defect affected the competency of the oral sphincter an orbicularis oris muscle flap was deemed most appropriate to restore this competency and normal muscle function.  Using a sterile surgical marker, an appropriate flap was drawn incorporating the defect. The area thus outlined was incised with a #15 scalpel blade. Following this, the designed flap was carried over into the primary defect and sutured into place. Melolabial Transposition Flap Text: The defect edges were debeveled with a #15 scalpel blade.  Given the location of the defect and the proximity to free margins a melolabial flap was deemed most appropriate.  Using a sterile surgical marker, an appropriate melolabial transposition flap was drawn incorporating the defect.    The area thus outlined was incised deep to adipose tissue with a #15 scalpel blade.  The skin margins were undermined to an appropriate distance in all directions utilizing iris scissors. Rhombic Flap Text: The defect edges were debeveled with a #15 scalpel blade.  Given the location of the defect and the proximity to free margins a rhombic flap was deemed most appropriate.  Using a sterile surgical marker, an appropriate rhombic flap was drawn incorporating the defect.    The area thus outlined was incised deep to adipose tissue with a #15 scalpel blade.  The skin margins were undermined to an appropriate distance in all directions utilizing iris scissors. Rhomboid Transposition Flap Text: The defect edges were debeveled with a #15 scalpel blade.  Given the location of the defect and the proximity to free margins a rhomboid transposition flap was deemed most appropriate.  Using a sterile surgical marker, an appropriate rhomboid flap was drawn incorporating the defect.    The area thus outlined was incised deep to adipose tissue with a #15 scalpel blade.  The skin margins were undermined to an appropriate distance in all directions utilizing iris scissors. Bi-Rhombic Flap Text: The defect edges were debeveled with a #15 scalpel blade.  Given the location of the defect and the proximity to free margins a bi-rhombic flap was deemed most appropriate.  Using a sterile surgical marker, an appropriate rhombic flap was drawn incorporating the defect. The area thus outlined was incised deep to adipose tissue with a #15 scalpel blade.  The skin margins were undermined to an appropriate distance in all directions utilizing iris scissors. Helical Rim Advancement Flap Text: The defect edges were debeveled with a #15 blade scalpel.  Given the location of the defect and the proximity to free margins (helical rim) a double helical rim advancement flap was deemed most appropriate.  Using a sterile surgical marker, the appropriate advancement flaps were drawn incorporating the defect and placing the expected incisions between the helical rim and antihelix where possible.  The area thus outlined was incised through and through with a #15 scalpel blade.  With a skin hook and iris scissors, the flaps were gently and sharply undermined and freed up. Bilateral Helical Rim Advancement Flap Text: The defect edges were debeveled with a #15 blade scalpel.  Given the location of the defect and the proximity to free margins (helical rim) a bilateral helical rim advancement flap was deemed most appropriate.  Using a sterile surgical marker, the appropriate advancement flaps were drawn incorporating the defect and placing the expected incisions between the helical rim and antihelix where possible.  The area thus outlined was incised through and through with a #15 scalpel blade.  With a skin hook and iris scissors, the flaps were gently and sharply undermined and freed up. Ear Star Wedge Flap Text: The defect edges were debeveled with a #15 blade scalpel.  Given the location of the defect and the proximity to free margins (helical rim) an ear star wedge flap was deemed most appropriate.  Using a sterile surgical marker, the appropriate flap was drawn incorporating the defect and placing the expected incisions between the helical rim and antihelix where possible.  The area thus outlined was incised through and through with a #15 scalpel blade. Banner Transposition Flap Text: The defect edges were debeveled with a #15 scalpel blade.  Given the location of the defect and the proximity to free margins a Banner transposition flap was deemed most appropriate.  Using a sterile surgical marker, an appropriate flap drawn around the defect. The area thus outlined was incised deep to adipose tissue with a #15 scalpel blade.  The skin margins were undermined to an appropriate distance in all directions utilizing iris scissors. Bilobed Flap Text: The defect edges were debeveled with a #15 scalpel blade.  Given the location of the defect and the proximity to free margins a bilobe flap was deemed most appropriate.  Using a sterile surgical marker, an appropriate bilobe flap drawn around the defect.    The area thus outlined was incised deep to adipose tissue with a #15 scalpel blade.  The skin margins were undermined to an appropriate distance in all directions utilizing iris scissors. Bilobed Transposition Flap Text: The defect edges were debeveled with a #15 scalpel blade.  Given the location of the defect and the proximity to free margins a bilobed transposition flap was deemed most appropriate.  Using a sterile surgical marker, an appropriate bilobe flap drawn around the defect.    The area thus outlined was incised deep to adipose tissue with a #15 scalpel blade.  The skin margins were undermined to an appropriate distance in all directions utilizing iris scissors. Trilobed Flap Text: The defect edges were debeveled with a #15 scalpel blade.  Given the location of the defect and the proximity to free margins a trilobed flap was deemed most appropriate.  Using a sterile surgical marker, an appropriate trilobed flap drawn around the defect.    The area thus outlined was incised deep to adipose tissue with a #15 scalpel blade.  The skin margins were undermined to an appropriate distance in all directions utilizing iris scissors. Dorsal Nasal Flap Text: The defect edges were debeveled with a #15 scalpel blade.  Given the location of the defect and the proximity to free margins a dorsal nasal flap was deemed most appropriate.  Using a sterile surgical marker, an appropriate dorsal nasal flap was drawn around the defect.    The area thus outlined was incised deep to adipose tissue with a #15 scalpel blade.  The skin margins were undermined to an appropriate distance in all directions utilizing iris scissors. Island Pedicle Flap Text: The defect edges were debeveled with a #15 scalpel blade.  Given the location of the defect, shape of the defect and the proximity to free margins an island pedicle advancement flap was deemed most appropriate.  Using a sterile surgical marker, an appropriate advancement flap was drawn incorporating the defect, outlining the appropriate donor tissue and placing the expected incisions within the relaxed skin tension lines where possible.    The area thus outlined was incised deep to adipose tissue with a #15 scalpel blade.  The skin margins were undermined to an appropriate distance in all directions around the primary defect and laterally outward around the island pedicle utilizing iris scissors.  There was minimal undermining beneath the pedicle flap. Island Pedicle Flap With Canthal Suspension Text: The defect edges were debeveled with a #15 scalpel blade.  Given the location of the defect, shape of the defect and the proximity to free margins an island pedicle advancement flap was deemed most appropriate.  Using a sterile surgical marker, an appropriate advancement flap was drawn incorporating the defect, outlining the appropriate donor tissue and placing the expected incisions within the relaxed skin tension lines where possible. The area thus outlined was incised deep to adipose tissue with a #15 scalpel blade.  The skin margins were undermined to an appropriate distance in all directions around the primary defect and laterally outward around the island pedicle utilizing iris scissors.  There was minimal undermining beneath the pedicle flap. A suspension suture was placed in the canthal tendon to prevent tension and prevent ectropion. Alar Island Pedicle Flap Text: The defect edges were debeveled with a #15 scalpel blade.  Given the location of the defect, shape of the defect and the proximity to the alar rim an island pedicle advancement flap was deemed most appropriate.  Using a sterile surgical marker, an appropriate advancement flap was drawn incorporating the defect, outlining the appropriate donor tissue and placing the expected incisions within the nasal ala running parallel to the alar rim. The area thus outlined was incised with a #15 scalpel blade.  The skin margins were undermined minimally to an appropriate distance in all directions around the primary defect and laterally outward around the island pedicle utilizing iris scissors.  There was minimal undermining beneath the pedicle flap. Double Island Pedicle Flap Text: The defect edges were debeveled with a #15 scalpel blade.  Given the location of the defect, shape of the defect and the proximity to free margins a double island pedicle advancement flap was deemed most appropriate.  Using a sterile surgical marker, an appropriate advancement flap was drawn incorporating the defect, outlining the appropriate donor tissue and placing the expected incisions within the relaxed skin tension lines where possible.    The area thus outlined was incised deep to adipose tissue with a #15 scalpel blade.  The skin margins were undermined to an appropriate distance in all directions around the primary defect and laterally outward around the island pedicle utilizing iris scissors.  There was minimal undermining beneath the pedicle flap. Island Pedicle Flap-Requiring Vessel Identification Text: The defect edges were debeveled with a #15 scalpel blade.  Given the location of the defect, shape of the defect and the proximity to free margins an island pedicle advancement flap was deemed most appropriate.  Using a sterile surgical marker, an appropriate advancement flap was drawn, based on the axial vessel mentioned above, incorporating the defect, outlining the appropriate donor tissue and placing the expected incisions within the relaxed skin tension lines where possible.    The area thus outlined was incised deep to adipose tissue with a #15 scalpel blade.  The skin margins were undermined to an appropriate distance in all directions around the primary defect and laterally outward around the island pedicle utilizing iris scissors.  There was minimal undermining beneath the pedicle flap. Keystone Flap Text: The defect edges were debeveled with a #15 scalpel blade.  Given the location of the defect, shape of the defect a keystone flap was deemed most appropriate.  Using a sterile surgical marker, an appropriate keystone flap was drawn incorporating the defect, outlining the appropriate donor tissue and placing the expected incisions within the relaxed skin tension lines where possible. The area thus outlined was incised deep to adipose tissue with a #15 scalpel blade.  The skin margins were undermined to an appropriate distance in all directions around the primary defect and laterally outward around the flap utilizing iris scissors. O-T Plasty Text: The defect edges were debeveled with a #15 scalpel blade.  Given the location of the defect, shape of the defect and the proximity to free margins an O-T plasty was deemed most appropriate.  Using a sterile surgical marker, an appropriate O-T plasty was drawn incorporating the defect and placing the expected incisions within the relaxed skin tension lines where possible.    The area thus outlined was incised deep to adipose tissue with a #15 scalpel blade.  The skin margins were undermined to an appropriate distance in all directions utilizing iris scissors. O-Z Plasty Text: The defect edges were debeveled with a #15 scalpel blade.  Given the location of the defect, shape of the defect and the proximity to free margins an O-Z plasty (double transposition flap) was deemed most appropriate.  Using a sterile surgical marker, the appropriate transposition flaps were drawn incorporating the defect and placing the expected incisions within the relaxed skin tension lines where possible.    The area thus outlined was incised deep to adipose tissue with a #15 scalpel blade.  The skin margins were undermined to an appropriate distance in all directions utilizing iris scissors.  Hemostasis was achieved with electrocautery.  The flaps were then transposed into place, one clockwise and the other counterclockwise, and anchored with interrupted buried subcutaneous sutures. Double O-Z Plasty Text: The defect edges were debeveled with a #15 scalpel blade.  Given the location of the defect, shape of the defect and the proximity to free margins a Double O-Z plasty (double transposition flap) was deemed most appropriate.  Using a sterile surgical marker, the appropriate transposition flaps were drawn incorporating the defect and placing the expected incisions within the relaxed skin tension lines where possible. The area thus outlined was incised deep to adipose tissue with a #15 scalpel blade.  The skin margins were undermined to an appropriate distance in all directions utilizing iris scissors.  Hemostasis was achieved with electrocautery.  The flaps were then transposed into place, one clockwise and the other counterclockwise, and anchored with interrupted buried subcutaneous sutures. V-Y Plasty Text: The defect edges were debeveled with a #15 scalpel blade.  Given the location of the defect, shape of the defect and the proximity to free margins an V-Y advancement flap was deemed most appropriate.  Using a sterile surgical marker, an appropriate advancement flap was drawn incorporating the defect and placing the expected incisions within the relaxed skin tension lines where possible.    The area thus outlined was incised deep to adipose tissue with a #15 scalpel blade.  The skin margins were undermined to an appropriate distance in all directions utilizing iris scissors. H Plasty Text: Given the location of the defect, shape of the defect and the proximity to free margins a H-plasty was deemed most appropriate for repair.  Using a sterile surgical marker, the appropriate advancement arms of the H-plasty were drawn incorporating the defect and placing the expected incisions within the relaxed skin tension lines where possible. The area thus outlined was incised deep to adipose tissue with a #15 scalpel blade. The skin margins were undermined to an appropriate distance in all directions utilizing iris scissors.  The opposing advancement arms were then advanced into place in opposite direction and anchored with interrupted buried subcutaneous sutures. W Plasty Text: The lesion was extirpated to the level of the fat with a #15 scalpel blade.  Given the location of the defect, shape of the defect and the proximity to free margins a W-plasty was deemed most appropriate for repair.  Using a sterile surgical marker, the appropriate transposition arms of the W-plasty were drawn incorporating the defect and placing the expected incisions within the relaxed skin tension lines where possible.    The area thus outlined was incised deep to adipose tissue with a #15 scalpel blade.  The skin margins were undermined to an appropriate distance in all directions utilizing iris scissors.  The opposing transposition arms were then transposed into place in opposite direction and anchored with interrupted buried subcutaneous sutures. Z Plasty Text: The lesion was extirpated to the level of the fat with a #15 scalpel blade.  Given the location of the defect, shape of the defect and the proximity to free margins a Z-plasty was deemed most appropriate for repair.  Using a sterile surgical marker, the appropriate transposition arms of the Z-plasty were drawn incorporating the defect and placing the expected incisions within the relaxed skin tension lines where possible.    The area thus outlined was incised deep to adipose tissue with a #15 scalpel blade.  The skin margins were undermined to an appropriate distance in all directions utilizing iris scissors.  The opposing transposition arms were then transposed into place in opposite direction and anchored with interrupted buried subcutaneous sutures. Double Z Plasty Text: The lesion was extirpated to the level of the fat with a #15 scalpel blade. Given the location of the defect, shape of the defect and the proximity to free margins a double Z-plasty was deemed most appropriate for repair. Using a sterile surgical marker, the appropriate transposition arms of the double Z-plasty were drawn incorporating the defect and placing the expected incisions within the relaxed skin tension lines where possible. The area thus outlined was incised deep to adipose tissue with a #15 scalpel blade. The skin margins were undermined to an appropriate distance in all directions utilizing iris scissors. The opposing transposition arms were then transposed and carried over into place in opposite direction and anchored with interrupted buried subcutaneous sutures. Zygomaticofacial Flap Text: Given the location of the defect, shape of the defect and the proximity to free margins a zygomaticofacial flap was deemed most appropriate for repair. Using a sterile surgical marker, the appropriate flap was drawn incorporating the defect and placing the expected incisions within the relaxed skin tension lines where possible. The area thus outlined was incised deep to adipose tissue with a #15 scalpel blade with preservation of a vascular pedicle.  The skin margins were undermined to an appropriate distance in all directions utilizing iris scissors. The flap was then carried over into the defect and anchored with interrupted buried subcutaneous sutures. Cheek Interpolation Flap Text: A decision was made to reconstruct the defect utilizing an interpolation axial flap and a staged reconstruction.  A telfa template was made of the defect.  This telfa template was then used to outline the Cheek Interpolation flap.  The donor area for the pedicle flap was then injected with anesthesia.  The flap was excised through the skin and subcutaneous tissue down to the layer of the underlying musculature.  The interpolation flap was carefully excised within this deep plane to maintain its blood supply.  The edges of the donor site were undermined.   The donor site was closed in a primary fashion.  The pedicle was then rotated into position and sutured.  Once the tube was sutured into place, adequate blood supply was confirmed with blanching and refill.  The pedicle was then wrapped with xeroform gauze and dressed appropriately with a telfa and gauze bandage to ensure continued blood supply and protect the attached pedicle. Cheek-To-Nose Interpolation Flap Text: A decision was made to reconstruct the defect utilizing an interpolation axial flap and a staged reconstruction.  A telfa template was made of the defect.  This telfa template was then used to outline the Cheek-To-Nose Interpolation flap.  The donor area for the pedicle flap was then injected with anesthesia.  The flap was excised through the skin and subcutaneous tissue down to the layer of the underlying musculature.  The interpolation flap was carefully excised within this deep plane to maintain its blood supply.  The edges of the donor site were undermined.   The donor site was closed in a primary fashion.  The pedicle was then rotated into position and sutured.  Once the tube was sutured into place, adequate blood supply was confirmed with blanching and refill.  The pedicle was then wrapped with xeroform gauze and dressed appropriately with a telfa and gauze bandage to ensure continued blood supply and protect the attached pedicle. Interpolation Flap Text: A decision was made to reconstruct the defect utilizing an interpolation axial flap and a staged reconstruction.  A telfa template was made of the defect.  This telfa template was then used to outline the interpolation flap.  The donor area for the pedicle flap was then injected with anesthesia.  The flap was excised through the skin and subcutaneous tissue down to the layer of the underlying musculature.  The interpolation flap was carefully excised within this deep plane to maintain its blood supply.  The edges of the donor site were undermined.   The donor site was closed in a primary fashion.  The pedicle was then rotated into position and sutured.  Once the tube was sutured into place, adequate blood supply was confirmed with blanching and refill.  The pedicle was then wrapped with xeroform gauze and dressed appropriately with a telfa and gauze bandage to ensure continued blood supply and protect the attached pedicle. Melolabial Interpolation Flap Text: A decision was made to reconstruct the defect utilizing an interpolation axial flap and a staged reconstruction.  A telfa template was made of the defect.  This telfa template was then used to outline the melolabial interpolation flap.  The donor area for the pedicle flap was then injected with anesthesia.  The flap was excised through the skin and subcutaneous tissue down to the layer of the underlying musculature.  The pedicle flap was carefully excised within this deep plane to maintain its blood supply.  The edges of the donor site were undermined.   The donor site was closed in a primary fashion.  The pedicle was then rotated into position and sutured.  Once the tube was sutured into place, adequate blood supply was confirmed with blanching and refill.  The pedicle was then wrapped with xeroform gauze and dressed appropriately with a telfa and gauze bandage to ensure continued blood supply and protect the attached pedicle. Mastoid Interpolation Flap Text: A decision was made to reconstruct the defect utilizing an interpolation axial flap and a staged reconstruction.  A telfa template was made of the defect.  This telfa template was then used to outline the mastoid interpolation flap.  The donor area for the pedicle flap was then injected with anesthesia.  The flap was excised through the skin and subcutaneous tissue down to the layer of the underlying musculature.  The pedicle flap was carefully excised within this deep plane to maintain its blood supply.  The edges of the donor site were undermined.   The donor site was closed in a primary fashion.  The pedicle was then rotated into position and sutured.  Once the tube was sutured into place, adequate blood supply was confirmed with blanching and refill.  The pedicle was then wrapped with xeroform gauze and dressed appropriately with a telfa and gauze bandage to ensure continued blood supply and protect the attached pedicle. Posterior Auricular Interpolation Flap Text: A decision was made to reconstruct the defect utilizing an interpolation axial flap and a staged reconstruction.  A telfa template was made of the defect.  This telfa template was then used to outline the posterior auricular interpolation flap.  The donor area for the pedicle flap was then injected with anesthesia.  The flap was excised through the skin and subcutaneous tissue down to the layer of the underlying musculature.  The pedicle flap was carefully excised within this deep plane to maintain its blood supply.  The edges of the donor site were undermined.   The donor site was closed in a primary fashion.  The pedicle was then rotated into position and sutured.  Once the tube was sutured into place, adequate blood supply was confirmed with blanching and refill.  The pedicle was then wrapped with xeroform gauze and dressed appropriately with a telfa and gauze bandage to ensure continued blood supply and protect the attached pedicle. Paramedian Forehead Flap Text: A decision was made to reconstruct the defect utilizing an interpolation axial flap and a staged reconstruction.  A telfa template was made of the defect.  This telfa template was then used to outline the paramedian forehead pedicle flap.  The donor area for the pedicle flap was then injected with anesthesia.  The flap was excised through the skin and subcutaneous tissue down to the layer of the underlying musculature.  The pedicle flap was carefully excised within this deep plane to maintain its blood supply.  The edges of the donor site were undermined.   The donor site was closed in a primary fashion.  The pedicle was then rotated into position and sutured.  Once the tube was sutured into place, adequate blood supply was confirmed with blanching and refill.  The pedicle was then wrapped with xeroform gauze and dressed appropriately with a telfa and gauze bandage to ensure continued blood supply and protect the attached pedicle. Abbe Flap (Upper To Lower Lip) Text: The defect of the lower lip was assessed and measured.  Given the location and size of the defect, an Abbe flap was deemed most appropriate. Using a sterile surgical marker, an appropriate Abbe flap was measured and drawn on the upper lip. Local anesthesia was then infiltrated.  A scalpel was then used to incise the upper lip through and through the skin, vermilion, muscle and mucosa, leaving the flap pedicled on the opposite side.  The flap was then rotated and transferred to the lower lip defect.  The flap was then sutured into place with a three layer technique, closing the orbicularis oris muscle layer with subcutaneous buried sutures, followed by a mucosal layer and an epidermal layer. Abbe Flap (Lower To Upper Lip) Text: The defect of the upper lip was assessed and measured.  Given the location and size of the defect, an Abbe flap was deemed most appropriate. Using a sterile surgical marker, an appropriate Abbe flap was measured and drawn on the lower lip. Local anesthesia was then infiltrated. A scalpel was then used to incise the upper lip through and through the skin, vermilion, muscle and mucosa, leaving the flap pedicled on the opposite side.  The flap was then rotated and transferred to the lower lip defect.  The flap was then sutured into place with a three layer technique, closing the orbicularis oris muscle layer with subcutaneous buried sutures, followed by a mucosal layer and an epidermal layer. Estlander Flap (Upper To Lower Lip) Text: The defect of the lower lip was assessed and measured.  Given the location and size of the defect, an Estlander flap was deemed most appropriate. Using a sterile surgical marker, an appropriate Estlander flap was measured and drawn on the upper lip. Local anesthesia was then infiltrated. A scalpel was then used to incise the lateral aspect of the flap, through skin, muscle and mucosa, leaving the flap pedicled medially.  The flap was then rotated and positioned to fill the lower lip defect.  The flap was then sutured into place with a three layer technique, closing the orbicularis oris muscle layer with subcutaneous buried sutures, followed by a mucosal layer and an epidermal layer. Lip Wedge Excision Repair Text: Given the location of the defect and the proximity to free margins a full thickness wedge repair was deemed most appropriate.  Using a sterile surgical marker, the appropriate repair was drawn incorporating the defect and placing the expected incisions perpendicular to the vermilion border.  The vermilion border was also meticulously outlined to ensure appropriate reapproximation during the repair.  The area thus outlined was incised through and through with a #15 scalpel blade.  The muscularis and dermis were reaproximated with deep sutures following hemostasis. Care was taken to realign the vermilion border before proceeding with the superficial closure.  Once the vermilion was realigned the superfical and mucosal closure was finished. Ftsg Text: The defect edges were debeveled with a #15 scalpel blade.  Given the location of the defect, shape of the defect and the proximity to free margins a full thickness skin graft was deemed most appropriate.  Using a sterile surgical marker, the primary defect shape was transferred to the donor site. The area thus outlined was incised deep to adipose tissue with a #15 scalpel blade.  The harvested graft was then trimmed of adipose tissue until only dermis and epidermis was left.  The skin margins of the secondary defect were undermined to an appropriate distance in all directions utilizing iris scissors.  The secondary defect was closed with interrupted buried subcutaneous sutures.  The skin edges were then re-apposed with running  sutures.  The skin graft was then placed in the primary defect and oriented appropriately. Split-Thickness Skin Graft Text: The defect edges were debeveled with a #15 scalpel blade.  Given the location of the defect, shape of the defect and the proximity to free margins a split thickness skin graft was deemed most appropriate.  Using a sterile surgical marker, the primary defect shape was transferred to the donor site. The split thickness graft was then harvested.  The skin graft was then placed in the primary defect and oriented appropriately. Pinch Graft Text: The defect edges were debeveled with a #15 scalpel blade. Given the location of the defect, shape of the defect and the proximity to free margins a pinch graft was deemed most appropriate. Using a sterile surgical marker, the primary defect shape was transferred to the donor site. The area thus outlined was incised deep to adipose tissue with a #15 scalpel blade.  The harvested graft was then trimmed of adipose tissue until only dermis and epidermis was left. The skin margins of the secondary defect were undermined to an appropriate distance in all directions utilizing iris scissors.  The secondary defect was closed with interrupted buried subcutaneous sutures.  The skin edges were then re-apposed with running  sutures.  The skin graft was then placed in the primary defect and oriented appropriately. Burow's Graft Text: The defect edges were debeveled with a #15 scalpel blade. Given the location of the defect, shape of the defect, the proximity to free margins and the presence of a standing cone deformity a Burow's skin graft was deemed most appropriate. The standing cone was removed and this tissue was then trimmed to the shape of the primary defect. The adipose tissue was also removed until only dermis and epidermis were left.  The skin margins of the secondary defect were undermined to an appropriate distance in all directions utilizing iris scissors.  The secondary defect was closed with interrupted buried subcutaneous sutures.  The skin edges were then re-apposed with running  sutures.  The skin graft was then placed in the primary defect and oriented appropriately. Cartilage Graft Text: The defect edges were debeveled with a #15 scalpel blade.  Given the location of the defect, shape of the defect, the fact the defect involved a full thickness cartilage defect a cartilage graft was deemed most appropriate.  An appropriate donor site was identified, cleansed, and anesthetized. The cartilage graft was then harvested and transferred to the recipient site, oriented appropriately and then sutured into place.  The secondary defect was then repaired using a primary closure. Composite Graft Text: The defect edges were debeveled with a #15 scalpel blade.  Given the location of the defect, shape of the defect, the proximity to free margins and the fact the defect was full thickness a composite graft was deemed most appropriate.  The defect was outline and then transferred to the donor site.  A full thickness graft was then excised from the donor site. The graft was then placed in the primary defect, oriented appropriately and then sutured into place.  The secondary defect was then repaired using a primary closure. Epidermal Autograft Text: The defect edges were debeveled with a #15 scalpel blade.  Given the location of the defect, shape of the defect and the proximity to free margins an epidermal autograft was deemed most appropriate.  Using a sterile surgical marker, the primary defect shape was transferred to the donor site. The epidermal graft was then harvested.  The skin graft was then placed in the primary defect and oriented appropriately. Dermal Autograft Text: The defect edges were debeveled with a #15 scalpel blade.  Given the location of the defect, shape of the defect and the proximity to free margins a dermal autograft was deemed most appropriate.  Using a sterile surgical marker, the primary defect shape was transferred to the donor site. The area thus outlined was incised deep to adipose tissue with a #15 scalpel blade.  The harvested graft was then trimmed of adipose and epidermal tissue until only dermis was left.  The skin graft was then placed in the primary defect and oriented appropriately. Skin Substitute Text: The defect edges were debeveled with a #15 scalpel blade.  Given the location of the defect, shape of the defect and the proximity to free margins a skin substitute graft was deemed most appropriate.  The graft material was trimmed to fit the size of the defect. The graft was then placed in the primary defect and oriented appropriately. Tissue Cultured Epidermal Autograft Text: The defect edges were debeveled with a #15 scalpel blade.  Given the location of the defect, shape of the defect and the proximity to free margins a tissue cultured epidermal autograft was deemed most appropriate.  The graft was then trimmed to fit the size of the defect.  The graft was then placed in the primary defect and oriented appropriately. Xenograft Text: The defect edges were debeveled with a #15 scalpel blade.  Given the location of the defect, shape of the defect and the proximity to free margins a xenograft was deemed most appropriate.  The graft was then trimmed to fit the size of the defect.  The graft was then placed in the primary defect and oriented appropriately. Purse String (Intermediate) Text: Given the location of the defect and the characteristics of the surrounding skin a purse string intermediate closure was deemed most appropriate.  Undermining was performed circumfirentially around the surgical defect.  A purse string suture was then placed and tightened. Purse String (Simple) Text: Given the location of the defect and the characteristics of the surrounding skin a purse string simple closure was deemed most appropriate.  Undermining was performed circumferentially around the surgical defect.  A purse string suture was then placed and tightened. Partial Purse String (Intermediate) Text: Given the location of the defect and the characteristics of the surrounding skin an intermediate purse string closure was deemed most appropriate.  Undermining was performed circumferentially around the surgical defect.  A purse string suture was then placed and tightened. Wound tension of the circular defect prevented complete closure of the wound. Partial Purse String (Simple) Text: Given the location of the defect and the characteristics of the surrounding skin a simple purse string closure was deemed most appropriate.  Undermining was performed circumferentially around the surgical defect.  A purse string suture was then placed and tightened. Wound tension of the circular defect prevented complete closure of the wound. Complex Repair And Single Advancement Flap Text: The defect edges were debeveled with a #15 scalpel blade.  The primary defect was closed partially with a complex linear closure.  Given the location of the remaining defect, shape of the defect and the proximity to free margins a single advancement flap was deemed most appropriate for complete closure of the defect.  Using a sterile surgical marker, an appropriate advancement flap was drawn incorporating the defect and placing the expected incisions within the relaxed skin tension lines where possible.    The area thus outlined was incised deep to adipose tissue with a #15 scalpel blade.  The skin margins were undermined to an appropriate distance in all directions utilizing iris scissors. Complex Repair And Double Advancement Flap Text: The defect edges were debeveled with a #15 scalpel blade.  The primary defect was closed partially with a complex linear closure.  Given the location of the remaining defect, shape of the defect and the proximity to free margins a double advancement flap was deemed most appropriate for complete closure of the defect.  Using a sterile surgical marker, an appropriate advancement flap was drawn incorporating the defect and placing the expected incisions within the relaxed skin tension lines where possible.    The area thus outlined was incised deep to adipose tissue with a #15 scalpel blade.  The skin margins were undermined to an appropriate distance in all directions utilizing iris scissors. Complex Repair And Modified Advancement Flap Text: The defect edges were debeveled with a #15 scalpel blade.  The primary defect was closed partially with a complex linear closure.  Given the location of the remaining defect, shape of the defect and the proximity to free margins a modified advancement flap was deemed most appropriate for complete closure of the defect.  Using a sterile surgical marker, an appropriate advancement flap was drawn incorporating the defect and placing the expected incisions within the relaxed skin tension lines where possible.    The area thus outlined was incised deep to adipose tissue with a #15 scalpel blade.  The skin margins were undermined to an appropriate distance in all directions utilizing iris scissors. Complex Repair And A-T Advancement Flap Text: The defect edges were debeveled with a #15 scalpel blade.  The primary defect was closed partially with a complex linear closure.  Given the location of the remaining defect, shape of the defect and the proximity to free margins an A-T advancement flap was deemed most appropriate for complete closure of the defect.  Using a sterile surgical marker, an appropriate advancement flap was drawn incorporating the defect and placing the expected incisions within the relaxed skin tension lines where possible.    The area thus outlined was incised deep to adipose tissue with a #15 scalpel blade.  The skin margins were undermined to an appropriate distance in all directions utilizing iris scissors. Complex Repair And O-T Advancement Flap Text: The defect edges were debeveled with a #15 scalpel blade.  The primary defect was closed partially with a complex linear closure.  Given the location of the remaining defect, shape of the defect and the proximity to free margins an O-T advancement flap was deemed most appropriate for complete closure of the defect.  Using a sterile surgical marker, an appropriate advancement flap was drawn incorporating the defect and placing the expected incisions within the relaxed skin tension lines where possible.    The area thus outlined was incised deep to adipose tissue with a #15 scalpel blade.  The skin margins were undermined to an appropriate distance in all directions utilizing iris scissors. Complex Repair And O-L Flap Text: The defect edges were debeveled with a #15 scalpel blade.  The primary defect was closed partially with a complex linear closure.  Given the location of the remaining defect, shape of the defect and the proximity to free margins an O-L flap was deemed most appropriate for complete closure of the defect.  Using a sterile surgical marker, an appropriate flap was drawn incorporating the defect and placing the expected incisions within the relaxed skin tension lines where possible.    The area thus outlined was incised deep to adipose tissue with a #15 scalpel blade.  The skin margins were undermined to an appropriate distance in all directions utilizing iris scissors. Complex Repair And Bilobe Flap Text: The defect edges were debeveled with a #15 scalpel blade.  The primary defect was closed partially with a complex linear closure.  Given the location of the remaining defect, shape of the defect and the proximity to free margins a bilobe flap was deemed most appropriate for complete closure of the defect.  Using a sterile surgical marker, an appropriate advancement flap was drawn incorporating the defect and placing the expected incisions within the relaxed skin tension lines where possible.    The area thus outlined was incised deep to adipose tissue with a #15 scalpel blade.  The skin margins were undermined to an appropriate distance in all directions utilizing iris scissors. Complex Repair And Melolabial Flap Text: The defect edges were debeveled with a #15 scalpel blade.  The primary defect was closed partially with a complex linear closure.  Given the location of the remaining defect, shape of the defect and the proximity to free margins a melolabial flap was deemed most appropriate for complete closure of the defect.  Using a sterile surgical marker, an appropriate advancement flap was drawn incorporating the defect and placing the expected incisions within the relaxed skin tension lines where possible.    The area thus outlined was incised deep to adipose tissue with a #15 scalpel blade.  The skin margins were undermined to an appropriate distance in all directions utilizing iris scissors. Complex Repair And Rotation Flap Text: The defect edges were debeveled with a #15 scalpel blade.  The primary defect was closed partially with a complex linear closure.  Given the location of the remaining defect, shape of the defect and the proximity to free margins a rotation flap was deemed most appropriate for complete closure of the defect.  Using a sterile surgical marker, an appropriate advancement flap was drawn incorporating the defect and placing the expected incisions within the relaxed skin tension lines where possible.    The area thus outlined was incised deep to adipose tissue with a #15 scalpel blade.  The skin margins were undermined to an appropriate distance in all directions utilizing iris scissors. Complex Repair And Rhombic Flap Text: The defect edges were debeveled with a #15 scalpel blade.  The primary defect was closed partially with a complex linear closure.  Given the location of the remaining defect, shape of the defect and the proximity to free margins a rhombic flap was deemed most appropriate for complete closure of the defect.  Using a sterile surgical marker, an appropriate advancement flap was drawn incorporating the defect and placing the expected incisions within the relaxed skin tension lines where possible.    The area thus outlined was incised deep to adipose tissue with a #15 scalpel blade.  The skin margins were undermined to an appropriate distance in all directions utilizing iris scissors. Complex Repair And Transposition Flap Text: The defect edges were debeveled with a #15 scalpel blade.  The primary defect was closed partially with a complex linear closure.  Given the location of the remaining defect, shape of the defect and the proximity to free margins a transposition flap was deemed most appropriate for complete closure of the defect.  Using a sterile surgical marker, an appropriate advancement flap was drawn incorporating the defect and placing the expected incisions within the relaxed skin tension lines where possible.    The area thus outlined was incised deep to adipose tissue with a #15 scalpel blade.  The skin margins were undermined to an appropriate distance in all directions utilizing iris scissors. Complex Repair And V-Y Plasty Text: The defect edges were debeveled with a #15 scalpel blade.  The primary defect was closed partially with a complex linear closure.  Given the location of the remaining defect, shape of the defect and the proximity to free margins a V-Y plasty was deemed most appropriate for complete closure of the defect.  Using a sterile surgical marker, an appropriate advancement flap was drawn incorporating the defect and placing the expected incisions within the relaxed skin tension lines where possible.    The area thus outlined was incised deep to adipose tissue with a #15 scalpel blade.  The skin margins were undermined to an appropriate distance in all directions utilizing iris scissors. Complex Repair And M Plasty Text: The defect edges were debeveled with a #15 scalpel blade.  The primary defect was closed partially with a complex linear closure.  Given the location of the remaining defect, shape of the defect and the proximity to free margins an M plasty was deemed most appropriate for complete closure of the defect.  Using a sterile surgical marker, an appropriate advancement flap was drawn incorporating the defect and placing the expected incisions within the relaxed skin tension lines where possible.    The area thus outlined was incised deep to adipose tissue with a #15 scalpel blade.  The skin margins were undermined to an appropriate distance in all directions utilizing iris scissors. Complex Repair And Double M Plasty Text: The defect edges were debeveled with a #15 scalpel blade.  The primary defect was closed partially with a complex linear closure.  Given the location of the remaining defect, shape of the defect and the proximity to free margins a double M plasty was deemed most appropriate for complete closure of the defect.  Using a sterile surgical marker, an appropriate advancement flap was drawn incorporating the defect and placing the expected incisions within the relaxed skin tension lines where possible.    The area thus outlined was incised deep to adipose tissue with a #15 scalpel blade.  The skin margins were undermined to an appropriate distance in all directions utilizing iris scissors. Complex Repair And W Plasty Text: The defect edges were debeveled with a #15 scalpel blade.  The primary defect was closed partially with a complex linear closure.  Given the location of the remaining defect, shape of the defect and the proximity to free margins a W plasty was deemed most appropriate for complete closure of the defect.  Using a sterile surgical marker, an appropriate advancement flap was drawn incorporating the defect and placing the expected incisions within the relaxed skin tension lines where possible.    The area thus outlined was incised deep to adipose tissue with a #15 scalpel blade.  The skin margins were undermined to an appropriate distance in all directions utilizing iris scissors. Complex Repair And Z Plasty Text: The defect edges were debeveled with a #15 scalpel blade.  The primary defect was closed partially with a complex linear closure.  Given the location of the remaining defect, shape of the defect and the proximity to free margins a Z plasty was deemed most appropriate for complete closure of the defect.  Using a sterile surgical marker, an appropriate advancement flap was drawn incorporating the defect and placing the expected incisions within the relaxed skin tension lines where possible.    The area thus outlined was incised deep to adipose tissue with a #15 scalpel blade.  The skin margins were undermined to an appropriate distance in all directions utilizing iris scissors. Complex Repair And Dorsal Nasal Flap Text: The defect edges were debeveled with a #15 scalpel blade.  The primary defect was closed partially with a complex linear closure.  Given the location of the remaining defect, shape of the defect and the proximity to free margins a dorsal nasal flap was deemed most appropriate for complete closure of the defect.  Using a sterile surgical marker, an appropriate flap was drawn incorporating the defect and placing the expected incisions within the relaxed skin tension lines where possible.    The area thus outlined was incised deep to adipose tissue with a #15 scalpel blade.  The skin margins were undermined to an appropriate distance in all directions utilizing iris scissors. Complex Repair And Ftsg Text: The defect edges were debeveled with a #15 scalpel blade.  The primary defect was closed partially with a complex linear closure.  Given the location of the defect, shape of the defect and the proximity to free margins a full thickness skin graft was deemed most appropriate to repair the remaining defect.  The graft was trimmed to fit the size of the remaining defect.  The graft was then placed in the primary defect, oriented appropriately, and sutured into place. Complex Repair And Burow's Graft Text: The defect edges were debeveled with a #15 scalpel blade.  The primary defect was closed partially with a complex linear closure.  Given the location of the defect, shape of the defect, the proximity to free margins and the presence of a standing cone deformity a Burow's graft was deemed most appropriate to repair the remaining defect.  The graft was trimmed to fit the size of the remaining defect.  The graft was then placed in the primary defect, oriented appropriately, and sutured into place. Complex Repair And Split-Thickness Skin Graft Text: The defect edges were debeveled with a #15 scalpel blade.  The primary defect was closed partially with a complex linear closure.  Given the location of the defect, shape of the defect and the proximity to free margins a split thickness skin graft was deemed most appropriate to repair the remaining defect.  The graft was trimmed to fit the size of the remaining defect.  The graft was then placed in the primary defect, oriented appropriately, and sutured into place. Complex Repair And Epidermal Autograft Text: The defect edges were debeveled with a #15 scalpel blade.  The primary defect was closed partially with a complex linear closure.  Given the location of the defect, shape of the defect and the proximity to free margins an epidermal autograft was deemed most appropriate to repair the remaining defect.  The graft was trimmed to fit the size of the remaining defect.  The graft was then placed in the primary defect, oriented appropriately, and sutured into place. Complex Repair And Dermal Autograft Text: The defect edges were debeveled with a #15 scalpel blade.  The primary defect was closed partially with a complex linear closure.  Given the location of the defect, shape of the defect and the proximity to free margins an dermal autograft was deemed most appropriate to repair the remaining defect.  The graft was trimmed to fit the size of the remaining defect.  The graft was then placed in the primary defect, oriented appropriately, and sutured into place. Complex Repair And Tissue Cultured Epidermal Autograft Text: The defect edges were debeveled with a #15 scalpel blade.  The primary defect was closed partially with a complex linear closure.  Given the location of the defect, shape of the defect and the proximity to free margins an tissue cultured epidermal autograft was deemed most appropriate to repair the remaining defect.  The graft was trimmed to fit the size of the remaining defect.  The graft was then placed in the primary defect, oriented appropriately, and sutured into place. Complex Repair And Xenograft Text: The defect edges were debeveled with a #15 scalpel blade.  The primary defect was closed partially with a complex linear closure.  Given the location of the defect, shape of the defect and the proximity to free margins a xenograft was deemed most appropriate to repair the remaining defect.  The graft was trimmed to fit the size of the remaining defect.  The graft was then placed in the primary defect, oriented appropriately, and sutured into place. Complex Repair And Skin Substitute Graft Text: The defect edges were debeveled with a #15 scalpel blade.  The primary defect was closed partially with a complex linear closure.  Given the location of the remaining defect, shape of the defect and the proximity to free margins a skin substitute graft was deemed most appropriate to repair the remaining defect.  The graft was trimmed to fit the size of the remaining defect.  The graft was then placed in the primary defect, oriented appropriately, and sutured into place. Path Notes (To The Dermatopathologist): Please check margins. Consent was obtained from the patient. The risks and benefits to therapy were discussed in detail. Specifically, the risks of infection, scarring, bleeding, prolonged wound healing, incomplete removal, allergy to anesthesia, nerve injury and recurrence were addressed. Prior to the procedure, the treatment site was clearly identified and confirmed by the patient. All components of Universal Protocol/PAUSE Rule completed. Post-Care Instructions: I reviewed with the patient in detail post-care instructions. Patient is not to engage in any heavy lifting, exercise, or swimming for the next 14 days. Should the patient develop any fevers, chills, bleeding, severe pain patient will contact the office immediately. Home Suture Removal Text: Patient was provided a home suture removal kit and will remove their sutures at home.  If they have any questions or difficulties they will call the office. Where Do You Want The Question To Include Opioid Counseling Located?: Case Summary Tab Information: Selecting Yes will display possible errors in your note based on the variables you have selected. This validation is only offered as a suggestion for you. PLEASE NOTE THAT THE VALIDATION TEXT WILL BE REMOVED WHEN YOU FINALIZE YOUR NOTE. IF YOU WANT TO FAX A PRELIMINARY NOTE YOU WILL NEED TO TOGGLE THIS TO 'NO' IF YOU DO NOT WANT IT IN YOUR FAXED NOTE.

## 2025-05-28 NOTE — PROGRESS NOTE ADULT - SUBJECTIVE AND OBJECTIVE BOX
[facility] Division of Hospital Medicine  Alexis Dong MD  Available via MS Teams    SUBJECTIVE / OVERNIGHT EVENTS: No acute events overnight. Patient feels symptoms overall improving. Denies any pain. No other concerns or complaints at this time.     Review of Systems:   CONSTITUTIONAL: No fever   EYES: No eye pain, visual disturbances, or discharge  ENMT: No difficulty hearing; some swelling of neck   RESPIRATORY: No SOB. No cough   CARDIOVASCULAR: No chest pain   GASTROINTESTINAL: No abdominal or epigastric pain. No nausea, vomiting, or hematemesis; No diarrhea    GENITOURINARY: No dysuria   NEUROLOGICAL: No headache   SKIN: No itching     MUSCULOSKELETAL: No joint pain or swelling; No muscle or back pain  PSYCHIATRIC: No depression or anxiety   HEME/LYMPH: No easy bruising or bleeding gums      MEDICATIONS  (STANDING):  ampicillin/sulbactam  IVPB 3 Gram(s) IV Intermittent every 6 hours  ampicillin/sulbactam  IVPB      cefTRIAXone   IVPB 1000 milliGRAM(s) IV Intermittent every 24 hours  dexAMETHasone  IVPB 10 milliGRAM(s) IV Intermittent every 8 hours  diphenhydrAMINE Injectable 25 milliGRAM(s) IV Push every 12 hours  famotidine Injectable 40 milliGRAM(s) IV Push every 12 hours  fluticasone propionate 50 MICROgram(s)/spray Nasal Spray 1 Spray(s) Both Nostrils two times a day  nicotine -  14 mG/24Hr(s) Patch 1 Patch Transdermal daily  sodium chloride 0.65% Nasal 1 Spray(s) Both Nostrils four times a day  sodium chloride 0.9%. 1000 milliLiter(s) (60 mL/Hr) IV Continuous <Continuous>      PHYSICAL EXAM:  Vital Signs Last 24 Hrs  T(C): 36.3 (28 May 2025 11:51), Max: 36.7 (28 May 2025 00:13)  T(F): 97.3 (28 May 2025 11:51), Max: 98 (28 May 2025 00:13)  HR: 97 (28 May 2025 11:51) (71 - 99)  BP: 112/58 (28 May 2025 11:51) (112/58 - 161/83)  RR: 18 (28 May 2025 11:51) (16 - 20)  SpO2: 98% (28 May 2025 11:51) (95% - 98%)    Parameters below as of 28 May 2025 11:51  Patient On (Oxygen Delivery Method): room air      CONSTITUTIONAL: NAD, well-developed  EYES: PERRLA; conjunctiva and sclera clear  ENMT: Moist oral mucosa, no pharyngeal injection or exudates   NECK: Supple   RESPIRATORY: Normal respiratory effort; lungs are clear to auscultation bilaterally  CARDIOVASCULAR: Regular rate and rhythm, normal S1 and S2   ABDOMEN: Nontender to palpation, normoactive bowel sounds   MUSCULOSKELETAL: no clubbing or cyanosis of digits; no joint swelling or tenderness to palpation  PSYCH: affect appropriate  NEUROLOGY: no gross sensory deficits   SKIN: No rashes     LABS:                        11.9   5.94  )-----------( 279      ( 28 May 2025 07:05 )             36.0     05-28    137  |  101  |  17  ----------------------------<  130[H]  4.3   |  21[L]  |  0.68    Ca    9.2      28 May 2025 07:05    TPro  6.3  /  Alb  3.8  /  TBili  0.5  /  DBili  x   /  AST  12  /  ALT  7[L]  /  AlkPhos  72  05-28    PT/INR - ( 27 May 2025 21:43 )   PT: 11.3 sec;   INR: 0.98 ratio         PTT - ( 27 May 2025 21:43 )  PTT:27.0 sec      Urinalysis Basic - ( 28 May 2025 07:05 )    Color: x / Appearance: x / SG: x / pH: x  Gluc: 130 mg/dL / Ketone: x  / Bili: x / Urobili: x   Blood: x / Protein: x / Nitrite: x   Leuk Esterase: x / RBC: x / WBC x   Sq Epi: x / Non Sq Epi: x / Bacteria: x      Urinalysis with Rflx Culture (collected 28 May 2025 00:27)    RADIOLOGY & ADDITIONAL TESTS:  New Imaging Personally Reviewed Today:  New Electrocardiogram Personally Reviewed Today:  Other Results Reviewed Today:   Prior or Outpatient Records Reviewed Today with Summary:    COORDINATION OF CARE:  Consultant Communication and Details of Discussion (where applicable):

## 2025-05-29 LAB
-  COAGULASE NEGATIVE STAPHYLOCOCCUS: SIGNIFICANT CHANGE UP
GLUCOSE BLDC GLUCOMTR-MCNC: 126 MG/DL — HIGH (ref 70–99)
GRAM STN FLD: ABNORMAL
METHOD TYPE: SIGNIFICANT CHANGE UP

## 2025-05-29 PROCEDURE — 99232 SBSQ HOSP IP/OBS MODERATE 35: CPT

## 2025-05-29 PROCEDURE — 74230 X-RAY XM SWLNG FUNCJ C+: CPT | Mod: 26

## 2025-05-29 RX ADMIN — Medication 1 SPRAY(S): at 14:06

## 2025-05-29 RX ADMIN — Medication 1 SPRAY(S): at 05:34

## 2025-05-29 RX ADMIN — NICOTINE POLACRILEX 1 PATCH: 4 GUM, CHEWING ORAL at 07:05

## 2025-05-29 RX ADMIN — DEXAMETHASONE 100 MILLIGRAM(S): 0.5 TABLET ORAL at 14:13

## 2025-05-29 RX ADMIN — DEXAMETHASONE 100 MILLIGRAM(S): 0.5 TABLET ORAL at 05:35

## 2025-05-29 RX ADMIN — Medication 25 MILLIGRAM(S): at 05:35

## 2025-05-29 RX ADMIN — FLUTICASONE PROPIONATE 1 SPRAY(S): 50 SPRAY, METERED NASAL at 05:35

## 2025-05-29 RX ADMIN — NICOTINE POLACRILEX 1 PATCH: 4 GUM, CHEWING ORAL at 14:07

## 2025-05-29 RX ADMIN — NICOTINE POLACRILEX 1 PATCH: 4 GUM, CHEWING ORAL at 07:34

## 2025-05-29 RX ADMIN — Medication 40 MILLIGRAM(S): at 18:58

## 2025-05-29 RX ADMIN — DEXAMETHASONE 100 MILLIGRAM(S): 0.5 TABLET ORAL at 22:14

## 2025-05-29 RX ADMIN — Medication 1 SPRAY(S): at 18:57

## 2025-05-29 RX ADMIN — AMPICILLIN SODIUM AND SULBACTAM SODIUM 200 GRAM(S): 1; .5 INJECTION, POWDER, FOR SOLUTION INTRAMUSCULAR; INTRAVENOUS at 21:15

## 2025-05-29 RX ADMIN — AMPICILLIN SODIUM AND SULBACTAM SODIUM 200 GRAM(S): 1; .5 INJECTION, POWDER, FOR SOLUTION INTRAMUSCULAR; INTRAVENOUS at 01:29

## 2025-05-29 RX ADMIN — AMPICILLIN SODIUM AND SULBACTAM SODIUM 200 GRAM(S): 1; .5 INJECTION, POWDER, FOR SOLUTION INTRAMUSCULAR; INTRAVENOUS at 08:00

## 2025-05-29 RX ADMIN — AMPICILLIN SODIUM AND SULBACTAM SODIUM 200 GRAM(S): 1; .5 INJECTION, POWDER, FOR SOLUTION INTRAMUSCULAR; INTRAVENOUS at 14:13

## 2025-05-29 RX ADMIN — Medication 60 MILLILITER(S): at 01:28

## 2025-05-29 RX ADMIN — Medication 40 MILLIGRAM(S): at 05:34

## 2025-05-29 RX ADMIN — FLUTICASONE PROPIONATE 1 SPRAY(S): 50 SPRAY, METERED NASAL at 19:00

## 2025-05-29 RX ADMIN — CEFTRIAXONE 100 MILLIGRAM(S): 500 INJECTION, POWDER, FOR SOLUTION INTRAMUSCULAR; INTRAVENOUS at 19:00

## 2025-05-29 RX ADMIN — Medication 1 SPRAY(S): at 00:00

## 2025-05-29 RX ADMIN — Medication 25 MILLIGRAM(S): at 18:59

## 2025-05-29 RX ADMIN — NICOTINE POLACRILEX 1 PATCH: 4 GUM, CHEWING ORAL at 21:25

## 2025-05-29 NOTE — PHYSICAL THERAPY INITIAL EVALUATION ADULT - TRANSFER TRAINING, PT EVAL
GOAL: Pt will perform sit to/from stand transfers independently with least restrictive AD vs w/o AD within 2-3 weeks.

## 2025-05-29 NOTE — PHYSICAL THERAPY INITIAL EVALUATION ADULT - PATIENT/FAMILY AGREES WITH PLAN
Sub acute rehab Home with home PT for safety assessment, gait,stair negotiation, balance, & strength training and to return pt to baseline functional mobility status.

## 2025-05-29 NOTE — PHYSICAL THERAPY INITIAL EVALUATION ADULT - PERTINENT HX OF CURRENT PROBLEM, REHAB EVAL
81 y/o, F, with history of seasonal allergies, presents to ED for 2 day history severe throat pain and difficulty swallowing secretion. ENT consulted for airway evaluation. Per patient, she had similar symptoms 2 years ago, but not as worse. Supraglottic edema.

## 2025-05-29 NOTE — PATIENT PROFILE ADULT - FALL HARM RISK - HARM RISK INTERVENTIONS
Assistance with ambulation/Assistance OOB with selected safe patient handling equipment/Communicate Risk of Fall with Harm to all staff/Discuss with provider need for PT consult/Monitor gait and stability/Provide patient with walking aids - walker, cane, crutches/Reinforce activity limits and safety measures with patient and family/Tailored Fall Risk Interventions/Use of alarms - bed, chair and/or voice tab/Visual Cue: Yellow wristband and red socks/Bed in lowest position, wheels locked, appropriate side rails in place/Call bell, personal items and telephone in reach/Instruct patient to call for assistance before getting out of bed or chair/Non-slip footwear when patient is out of bed/[place] to call system/Physically safe environment - no spills, clutter or unnecessary equipment/Purposeful Proactive Rounding/Room/bathroom lighting operational, light cord in reach

## 2025-05-29 NOTE — PROGRESS NOTE ADULT - ASSESSMENT
79 y/o, F, with history of seasonal allergies, presents to ED for 2 day history severe throat pain and difficulty swallowing secretion. ENT consulted for airway evaluation. Per patient, she had similar symptoms 2 years ago, but not as worse. Pt c/o gurgling sound in her throat. On initial laryngoscopy found to have moderate to severe bilateral arytenoid edema, pooling of secretions but has been comfortable on room air. Repeat laryngoscopy has been showing improvement in edema and secretions. 79 y/o, F, with history of seasonal allergies, presents to ED for 2 day history severe throat pain and difficulty swallowing secretion. ENT consulted for airway evaluation. Per patient, she had similar symptoms 2 years ago, but not as worse. Pt c/o gurgling sound in her throat. On initial laryngoscopy found to have moderate to severe bilateral arytenoid edema, pooling of secretions but has been comfortable on room air. Repeat laryngoscopy has been showing improvement in edema and secretions.  Pt states that she is breathing fine but has been feeling dizzy. Today on exam, neck mildly TTP. Repeat laryngoscopy showed b/l arytenoid edema R>L and pooling of secretions continuing to improve, airway patent. Pt comfortable and O2 sat 98% on room air.

## 2025-05-29 NOTE — PHYSICAL THERAPY INITIAL EVALUATION ADULT - NSPTDMEREC_GEN_A_CORE
Rolling Walker.Patient will require RW at home   to help complete mobility related activities for daily living./rolling walker

## 2025-05-29 NOTE — PROGRESS NOTE ADULT - SUBJECTIVE AND OBJECTIVE BOX
ENT ISSUE: nonobstructive supraglottitis    HPI: 81 y/o, F, with history of seasonal allergies, presents to ED for 2 day history severe throat pain and difficulty swallowing secretion. ENT consulted for airway evaluation. Per patient, she had similar symptoms 2 years ago, but not as worse. Pt c/o gurgling sound in her throat. On initial laryngoscopy found to have moderate to severe bilateral arytenoid edema, pooling of secretions but has been comfortable on room air. Repeat laryngoscopy has been showing improvement in edema and secretions. Pt has been on unasyn and decadron 10mg q8 hrs.         PAST MEDICAL & SURGICAL HISTORY:  No pertinent past medical history      No significant past surgical history        Allergies    No Known Allergies    Intolerances      MEDICATIONS  (STANDING):  ampicillin/sulbactam  IVPB 3 Gram(s) IV Intermittent every 6 hours  ampicillin/sulbactam  IVPB      cefTRIAXone   IVPB 1000 milliGRAM(s) IV Intermittent every 24 hours  dexAMETHasone  IVPB 10 milliGRAM(s) IV Intermittent every 8 hours  diphenhydrAMINE Injectable 25 milliGRAM(s) IV Push every 12 hours  famotidine Injectable 40 milliGRAM(s) IV Push every 12 hours  fluticasone propionate 50 MICROgram(s)/spray Nasal Spray 1 Spray(s) Both Nostrils two times a day  nicotine -  14 mG/24Hr(s) Patch 1 Patch Transdermal daily  sodium chloride 0.65% Nasal 1 Spray(s) Both Nostrils four times a day  sodium chloride 0.9%. 1000 milliLiter(s) (60 mL/Hr) IV Continuous <Continuous>    MEDICATIONS  (PRN):        Social History: see consult    Family history: see consult      ROS:   ENT: all negative except as noted in HPI   Pulm: denies SOB, cough, hemoptysis  Neuro: denies numbness/tingling, loss of sensation  Endo: denies heat/cold intolerance, excessive sweating      Vital Signs Last 24 Hrs  T(C): 36.6 (29 May 2025 04:42), Max: 36.7 (28 May 2025 19:25)  T(F): 97.8 (29 May 2025 04:42), Max: 98 (28 May 2025 19:25)  HR: 86 (29 May 2025 04:42) (74 - 97)  BP: 127/67 (29 May 2025 04:42) (112/58 - 170/77)  BP(mean): --  RR: 18 (29 May 2025 04:42) (18 - 18)  SpO2: 95% (29 May 2025 04:42) (95% - 98%)    Parameters below as of 29 May 2025 04:42  Patient On (Oxygen Delivery Method): room air                              11.9   5.94  )-----------( 279      ( 28 May 2025 07:05 )             36.0    05-28    137  |  101  |  17  ----------------------------<  130[H]  4.3   |  21[L]  |  0.68    Ca    9.2      28 May 2025 07:05    TPro  6.3  /  Alb  3.8  /  TBili  0.5  /  DBili  x   /  AST  12  /  ALT  7[L]  /  AlkPhos  72  05-28   PT/INR - ( 27 May 2025 21:43 )   PT: 11.3 sec;   INR: 0.98 ratio         PTT - ( 27 May 2025 21:43 )  PTT:27.0 sec      PHYSICAL EXAM:  Gen: NAD  Skin: No rashes, bruises, or lesions  Head: Normocephalic, Atraumatic  Face: no edema, erythema, or fluctuance. Parotid glands soft without mass  Eyes: no scleral injection  Nose: Nares bilaterally patent, no discharge  Mouth: No Stridor / Drooling / Trismus.  Mucosa moist, tongue/uvula midline, oropharynx clear  Neck: Flat, supple, no lymphadenopathy, trachea midline, no masses. mild TTP.  Lymphatic: No lymphadenopathy  Resp: breathing easily, no stridor  Neuro: no facial droop           ENT ISSUE: nonobstructive supraglottitis    HPI: 79 y/o, F, with history of seasonal allergies, presents to ED for 2 day history severe throat pain and difficulty swallowing secretion. ENT consulted for airway evaluation. Per patient, she had similar symptoms 2 years ago, but not as worse. Pt c/o gurgling sound in her throat. On initial laryngoscopy found to have moderate to severe bilateral arytenoid edema, pooling of secretions but has been comfortable on room air. Repeat laryngoscopy has been showing improvement in edema and secretions. Pt has been on unasyn and decadron 10mg q8 hrs. Pt states that she is breathing fine but has been feeling dizzy. Denies fever, N/V, dysphagia, odynophagia, dysphonia, SOB, dyspnea, changes in voice or inability to tolerate secretions.         PAST MEDICAL & SURGICAL HISTORY:  No pertinent past medical history      No significant past surgical history        Allergies    No Known Allergies    Intolerances      MEDICATIONS  (STANDING):  ampicillin/sulbactam  IVPB 3 Gram(s) IV Intermittent every 6 hours  ampicillin/sulbactam  IVPB      cefTRIAXone   IVPB 1000 milliGRAM(s) IV Intermittent every 24 hours  dexAMETHasone  IVPB 10 milliGRAM(s) IV Intermittent every 8 hours  diphenhydrAMINE Injectable 25 milliGRAM(s) IV Push every 12 hours  famotidine Injectable 40 milliGRAM(s) IV Push every 12 hours  fluticasone propionate 50 MICROgram(s)/spray Nasal Spray 1 Spray(s) Both Nostrils two times a day  nicotine -  14 mG/24Hr(s) Patch 1 Patch Transdermal daily  sodium chloride 0.65% Nasal 1 Spray(s) Both Nostrils four times a day  sodium chloride 0.9%. 1000 milliLiter(s) (60 mL/Hr) IV Continuous <Continuous>    MEDICATIONS  (PRN):        Social History: see consult    Family history: see consult      ROS:   ENT: all negative except as noted in HPI   Pulm: denies SOB, cough, hemoptysis  Neuro: denies numbness/tingling, loss of sensation  Endo: denies heat/cold intolerance, excessive sweating      Vital Signs Last 24 Hrs  T(C): 36.6 (29 May 2025 04:42), Max: 36.7 (28 May 2025 19:25)  T(F): 97.8 (29 May 2025 04:42), Max: 98 (28 May 2025 19:25)  HR: 86 (29 May 2025 04:42) (74 - 97)  BP: 127/67 (29 May 2025 04:42) (112/58 - 170/77)  BP(mean): --  RR: 18 (29 May 2025 04:42) (18 - 18)  SpO2: 95% (29 May 2025 04:42) (95% - 98%)    Parameters below as of 29 May 2025 04:42  Patient On (Oxygen Delivery Method): room air                              11.9   5.94  )-----------( 279      ( 28 May 2025 07:05 )             36.0    05-28    137  |  101  |  17  ----------------------------<  130[H]  4.3   |  21[L]  |  0.68    Ca    9.2      28 May 2025 07:05    TPro  6.3  /  Alb  3.8  /  TBili  0.5  /  DBili  x   /  AST  12  /  ALT  7[L]  /  AlkPhos  72  05-28   PT/INR - ( 27 May 2025 21:43 )   PT: 11.3 sec;   INR: 0.98 ratio         PTT - ( 27 May 2025 21:43 )  PTT:27.0 sec      PHYSICAL EXAM:  Gen: NAD  Skin: No rashes, bruises, or lesions  Head: Normocephalic, Atraumatic  Face: no edema, erythema, or fluctuance. Parotid glands soft without mass  Eyes: no scleral injection  Nose: Nares bilaterally patent, no discharge  Mouth: No Stridor / Drooling / Trismus.  Mucosa moist, tongue/uvula midline, oropharynx clear  Neck: Flat, supple, no lymphadenopathy, trachea midline, no masses.   Lymphatic: No lymphadenopathy  Resp: breathing easily, no stridor  Neuro: no facial droop        Fiberoptic Flexible Laryngoscopy  Indication: laryngeal edema  Patient was unable to cooperate with mirror.    b/l arytenoid edema R>L improved compared to prior exam. improved pooling of secretions. Nasopharynx, oropharynx, and hypopharynx clear, no bleeding. Tongue base, posterior pharyngeal wall, vallecula, epiglottis, and subglottis appear normal. No erythema, masses or lesions. Airway patent, no foreign body visualized. No glottic edema. True vocal cords, vestibular folds, ventricles, pyriform sinuses, and aryepiglottic folds appear normal bilaterally. Vocal cords mobile with good contact b/l.

## 2025-05-29 NOTE — PHYSICAL THERAPY INITIAL EVALUATION ADULT - NSPTDISCHREC_GEN_A_CORE
If pt. to d/c home, would recommend home with PT and assist for all functional mobility. DME- [facility].Patient will require RW at home  to help complete mobility related activities for daily living./Sub-acute Rehab Assist for all functional mobility./Home PT

## 2025-05-29 NOTE — SWALLOW VFSS/MBS ASSESSMENT ADULT - ASPIRATION PRECAUTIONS
Monitor for s/s aspiration/laryngeal penetration. If noted:  D/C p.o. intake, provide non-oral nutrition/hydration/meds, and contact this service @ [phone]

## 2025-05-29 NOTE — SWALLOW VFSS/MBS ASSESSMENT ADULT - LARYNGEAL PENETRATION DURING THE SWALLOW - SILENT
along the laryngeal surface of the epiglottis; fully retrieved during the course of the swallow/Trace

## 2025-05-29 NOTE — PHYSICAL THERAPY INITIAL EVALUATION ADULT - ADDITIONAL COMMENTS
Pt reports she lives in apt. with nephew, no steps to get in. No HHA services. Patient ambulated without AD independent. Call placed to pt's family to verify PLOF/ social situation. No answer. Pt reports she lives in apt. with nephew, no steps to get in. No HHA services. Patient ambulated without AD independent. Call placed to pt's spouse to verify PLOF/ social situation. No answer.

## 2025-05-29 NOTE — PROGRESS NOTE ADULT - PROBLEM SELECTOR PLAN 1
Seen on CT  - ENT following, recs appreciated   - MBS done; ok for regular diet per S/S eval   - Low threshold for MICU consult or intubation if pt develop shortness of breath or stridor   - continue Decadron 10mg IV Q8H for 48 hours (will end tonight 5/29)    - Pepcid 40mg IV BID   - Benadryl 25mg IV BID  - c/w continuous pulse Ox monitoring   - flu/covid/rsv neg  - nlood cx neg to date  - continue with unasyn and CTX per ENT; f/u about duration of abx

## 2025-05-29 NOTE — SWALLOW VFSS/MBS ASSESSMENT ADULT - SLP GENERAL OBSERVATIONS
Pt encountered in radiology, secure in MARKO chair, awake/alert, on room air. Improved vocal quality. Cooperative with exam. Mildly confused.

## 2025-05-29 NOTE — SWALLOW VFSS/MBS ASSESSMENT ADULT - DIAGNOSTIC IMPRESSIONS
Pt now presents with an overtly functional oropharyngeal swallow sequence. Exam notable only for trace laryngeal penetration during the swallow with thin liquids which is fully retrieved, and trace-mild pharyngeal residue (solids>purees>thin liquids) which is also spontaneously retrieved. No aspiration.

## 2025-05-29 NOTE — PROGRESS NOTE ADULT - SUBJECTIVE AND OBJECTIVE BOX
[facility] Division of Hospital Medicine  Alexis Dong MD  Available via MS Teams    SUBJECTIVE / OVERNIGHT EVENTS: No acute events overnight. Patient states feeling well overall. States swelling improving. No issues with swallowing. No other concerns or complaints at this time.     Review of Systems:   CONSTITUTIONAL: No fever   EYES: No eye pain, visual disturbances, or discharge  ENMT: No difficulty hearing   RESPIRATORY: No SOB. No cough   CARDIOVASCULAR: No chest pain   GASTROINTESTINAL: No abdominal or epigastric pain. No nausea, vomiting, or hematemesis; No diarrhea   GENITOURINARY: No dysuria   NEUROLOGICAL: No headache   SKIN: No itching   MUSCULOSKELETAL: No joint pain or swelling; No muscle or back pain  PSYCHIATRIC: No depression or anxiety  HEME/LYMPH: No easy bruising or bleeding gums    MEDICATIONS  (STANDING):  ampicillin/sulbactam  IVPB 3 Gram(s) IV Intermittent every 6 hours  ampicillin/sulbactam  IVPB      cefTRIAXone   IVPB 1000 milliGRAM(s) IV Intermittent every 24 hours  dexAMETHasone  IVPB 10 milliGRAM(s) IV Intermittent every 8 hours  diphenhydrAMINE Injectable 25 milliGRAM(s) IV Push every 12 hours  famotidine Injectable 40 milliGRAM(s) IV Push every 12 hours  fluticasone propionate 50 MICROgram(s)/spray Nasal Spray 1 Spray(s) Both Nostrils two times a day  nicotine -  14 mG/24Hr(s) Patch 1 Patch Transdermal daily  sodium chloride 0.65% Nasal 1 Spray(s) Both Nostrils four times a day  sodium chloride 0.9%. 1000 milliLiter(s) (60 mL/Hr) IV Continuous <Continuous>    PHYSICAL EXAM:  Vital Signs Last 24 Hrs  T(C): 36.6 (29 May 2025 04:42), Max: 36.7 (28 May 2025 19:25)  T(F): 97.8 (29 May 2025 04:42), Max: 98 (28 May 2025 19:25)  HR: 86 (29 May 2025 04:42) (74 - 86)  BP: 127/67 (29 May 2025 04:42) (127/67 - 170/77)  RR: 18 (29 May 2025 04:42) (18 - 18)  SpO2: 95% (29 May 2025 04:42) (95% - 97%)    Parameters below as of 29 May 2025 04:42  Patient On (Oxygen Delivery Method): room air      CONSTITUTIONAL: NAD, well-developed   EYES: PERRLA; conjunctiva and sclera clear  ENMT: Moist oral mucosa, no pharyngeal injection or exudates   NECK: Supple   RESPIRATORY: Normal respiratory effort; lungs are clear to auscultation bilaterally  CARDIOVASCULAR: Regular rate and rhythm, normal S1 and S2   ABDOMEN: Nontender to palpation, normoactive bowel sounds   MUSCULOSKELETAL: no clubbing or cyanosis of digits; no joint swelling or tenderness to palpation  PSYCH: affect appropriate  NEUROLOGY: no gross sensory deficits   SKIN: No rashes     LABS:                        11.9   5.94  )-----------( 279      ( 28 May 2025 07:05 )             36.0     05-28    137  |  101  |  17  ----------------------------<  130[H]  4.3   |  21[L]  |  0.68    Ca    9.2      28 May 2025 07:05    TPro  6.3  /  Alb  3.8  /  TBili  0.5  /  DBili  x   /  AST  12  /  ALT  7[L]  /  AlkPhos  72  05-28    PT/INR - ( 27 May 2025 21:43 )   PT: 11.3 sec;   INR: 0.98 ratio         PTT - ( 27 May 2025 21:43 )  PTT:27.0 sec      Urinalysis Basic - ( 28 May 2025 07:05 )    Color: x / Appearance: x / SG: x / pH: x  Gluc: 130 mg/dL / Ketone: x  / Bili: x / Urobili: x   Blood: x / Protein: x / Nitrite: x   Leuk Esterase: x / RBC: x / WBC x   Sq Epi: x / Non Sq Epi: x / Bacteria: x        Urinalysis with Rflx Culture (collected 28 May 2025 00:27)    Culture - Blood (collected 27 May 2025 22:11)  Source: Blood Blood-Peripheral  Preliminary Report (29 May 2025 02:03):    No growth at 24 hours    Culture - Blood (collected 27 May 2025 21:25)  Source: Blood Blood-Peripheral  Preliminary Report (29 May 2025 01:02):    No growth at 24 hours      RADIOLOGY & ADDITIONAL TESTS:  New Imaging Personally Reviewed Today:  New Electrocardiogram Personally Reviewed Today:  Other Results Reviewed Today:   Prior or Outpatient Records Reviewed Today with Summary:    COORDINATION OF CARE:  Consultant Communication and Details of Discussion (where applicable):

## 2025-05-30 ENCOUNTER — TRANSCRIPTION ENCOUNTER (OUTPATIENT)
Age: 80
End: 2025-05-30

## 2025-05-30 LAB
ANION GAP SERPL CALC-SCNC: 14 MMOL/L — SIGNIFICANT CHANGE UP (ref 5–17)
BASOPHILS # BLD AUTO: 0 K/UL — SIGNIFICANT CHANGE UP (ref 0–0.2)
BASOPHILS NFR BLD AUTO: 0 % — SIGNIFICANT CHANGE UP (ref 0–2)
BUN SERPL-MCNC: 26 MG/DL — HIGH (ref 7–23)
CALCIUM SERPL-MCNC: 9.4 MG/DL — SIGNIFICANT CHANGE UP (ref 8.4–10.5)
CHLORIDE SERPL-SCNC: 99 MMOL/L — SIGNIFICANT CHANGE UP (ref 96–108)
CO2 SERPL-SCNC: 23 MMOL/L — SIGNIFICANT CHANGE UP (ref 22–31)
CREAT SERPL-MCNC: 0.76 MG/DL — SIGNIFICANT CHANGE UP (ref 0.5–1.3)
CULTURE RESULTS: ABNORMAL
EGFR: 80 ML/MIN/1.73M2 — SIGNIFICANT CHANGE UP
EGFR: 80 ML/MIN/1.73M2 — SIGNIFICANT CHANGE UP
EOSINOPHIL # BLD AUTO: 0 K/UL — SIGNIFICANT CHANGE UP (ref 0–0.5)
EOSINOPHIL NFR BLD AUTO: 0 % — SIGNIFICANT CHANGE UP (ref 0–6)
GLUCOSE SERPL-MCNC: 116 MG/DL — HIGH (ref 70–99)
HCT VFR BLD CALC: 39.3 % — SIGNIFICANT CHANGE UP (ref 34.5–45)
HGB BLD-MCNC: 13 G/DL — SIGNIFICANT CHANGE UP (ref 11.5–15.5)
IMM GRANULOCYTES NFR BLD AUTO: 0.9 % — SIGNIFICANT CHANGE UP (ref 0–0.9)
LYMPHOCYTES # BLD AUTO: 0.83 K/UL — LOW (ref 1–3.3)
LYMPHOCYTES # BLD AUTO: 9.7 % — LOW (ref 13–44)
MAGNESIUM SERPL-MCNC: 2.5 MG/DL — SIGNIFICANT CHANGE UP (ref 1.6–2.6)
MCHC RBC-ENTMCNC: 28.6 PG — SIGNIFICANT CHANGE UP (ref 27–34)
MCHC RBC-ENTMCNC: 33.1 G/DL — SIGNIFICANT CHANGE UP (ref 32–36)
MCV RBC AUTO: 86.6 FL — SIGNIFICANT CHANGE UP (ref 80–100)
MONOCYTES # BLD AUTO: 0.61 K/UL — SIGNIFICANT CHANGE UP (ref 0–0.9)
MONOCYTES NFR BLD AUTO: 7.1 % — SIGNIFICANT CHANGE UP (ref 2–14)
NEUTROPHILS # BLD AUTO: 7.06 K/UL — SIGNIFICANT CHANGE UP (ref 1.8–7.4)
NEUTROPHILS NFR BLD AUTO: 82.3 % — HIGH (ref 43–77)
NRBC BLD AUTO-RTO: 0 /100 WBCS — SIGNIFICANT CHANGE UP (ref 0–0)
ORGANISM # SPEC MICROSCOPIC CNT: ABNORMAL
ORGANISM # SPEC MICROSCOPIC CNT: ABNORMAL
PHOSPHATE SERPL-MCNC: 3.1 MG/DL — SIGNIFICANT CHANGE UP (ref 2.5–4.5)
PLATELET # BLD AUTO: 264 K/UL — SIGNIFICANT CHANGE UP (ref 150–400)
POTASSIUM SERPL-MCNC: 3.8 MMOL/L — SIGNIFICANT CHANGE UP (ref 3.5–5.3)
POTASSIUM SERPL-SCNC: 3.8 MMOL/L — SIGNIFICANT CHANGE UP (ref 3.5–5.3)
RBC # BLD: 4.54 M/UL — SIGNIFICANT CHANGE UP (ref 3.8–5.2)
RBC # FLD: 14 % — SIGNIFICANT CHANGE UP (ref 10.3–14.5)
SODIUM SERPL-SCNC: 136 MMOL/L — SIGNIFICANT CHANGE UP (ref 135–145)
SPECIMEN SOURCE: SIGNIFICANT CHANGE UP
WBC # BLD: 8.58 K/UL — SIGNIFICANT CHANGE UP (ref 3.8–10.5)
WBC # FLD AUTO: 8.58 K/UL — SIGNIFICANT CHANGE UP (ref 3.8–10.5)

## 2025-05-30 PROCEDURE — 99232 SBSQ HOSP IP/OBS MODERATE 35: CPT

## 2025-05-30 RX ORDER — AMOXICILLIN AND CLAVULANATE POTASSIUM 500; 125 MG/1; MG/1
1 TABLET, FILM COATED ORAL
Refills: 0 | Status: DISCONTINUED | OUTPATIENT
Start: 2025-05-30 | End: 2025-06-01

## 2025-05-30 RX ADMIN — NICOTINE POLACRILEX 1 PATCH: 4 GUM, CHEWING ORAL at 07:04

## 2025-05-30 RX ADMIN — Medication 20 MILLIGRAM(S): at 17:32

## 2025-05-30 RX ADMIN — FLUTICASONE PROPIONATE 1 SPRAY(S): 50 SPRAY, METERED NASAL at 06:33

## 2025-05-30 RX ADMIN — NICOTINE POLACRILEX 1 PATCH: 4 GUM, CHEWING ORAL at 12:50

## 2025-05-30 RX ADMIN — AMPICILLIN SODIUM AND SULBACTAM SODIUM 200 GRAM(S): 1; .5 INJECTION, POWDER, FOR SOLUTION INTRAMUSCULAR; INTRAVENOUS at 02:54

## 2025-05-30 RX ADMIN — Medication 1 SPRAY(S): at 00:14

## 2025-05-30 RX ADMIN — NICOTINE POLACRILEX 1 PATCH: 4 GUM, CHEWING ORAL at 19:45

## 2025-05-30 RX ADMIN — NICOTINE POLACRILEX 1 PATCH: 4 GUM, CHEWING ORAL at 11:45

## 2025-05-30 RX ADMIN — AMPICILLIN SODIUM AND SULBACTAM SODIUM 200 GRAM(S): 1; .5 INJECTION, POWDER, FOR SOLUTION INTRAMUSCULAR; INTRAVENOUS at 09:48

## 2025-05-30 RX ADMIN — Medication 40 MILLIGRAM(S): at 06:32

## 2025-05-30 RX ADMIN — Medication 1 SPRAY(S): at 06:33

## 2025-05-30 RX ADMIN — AMOXICILLIN AND CLAVULANATE POTASSIUM 1 TABLET(S): 500; 125 TABLET, FILM COATED ORAL at 17:32

## 2025-05-30 RX ADMIN — Medication 25 MILLIGRAM(S): at 06:33

## 2025-05-30 RX ADMIN — AMOXICILLIN AND CLAVULANATE POTASSIUM 1 TABLET(S): 500; 125 TABLET, FILM COATED ORAL at 14:30

## 2025-05-30 NOTE — DISCHARGE NOTE PROVIDER - PROVIDER TOKENS
PROVIDER:[TOKEN:[[id number]],FOLLOWUP:[1 week],ESTABLISHEDPATIENT:[T]] PROVIDER:[TOKEN:[[id number]],FOLLOWUP:[1 week],ESTABLISHEDPATIENT:[T]],FREE:[LAST:[[facility]],PHONE:[phone]],FAX:[(   )    -],ADDRESS:[[address] [place] [address], [facility].],FOLLOWUP:[1 week]]

## 2025-05-30 NOTE — PROGRESS NOTE ADULT - SUBJECTIVE AND OBJECTIVE BOX
[facility] Division of Hospital Medicine  Alexis Dong MD  Available via MS Teams    SUBJECTIVE / OVERNIGHT EVENTS: No acute events overnight. Patient feeling overall well this morning. Denies any chest pain or SOB. No difficulty swallowing. Denies any new complaints or concerns at this time.     Review of Systems:   CONSTITUTIONAL: No fever   EYES: No eye pain, visual disturbances, or discharge  ENMT: No difficulty hearing  RESPIRATORY: No SOB. No cough   CARDIOVASCULAR: No chest pain   GASTROINTESTINAL: No abdominal or epigastric pain. No nausea, vomiting, or hematemesis; No diarrhea   GENITOURINARY: No dysuria   NEUROLOGICAL: No headache  SKIN: No itching    MUSCULOSKELETAL: No joint pain or swelling; No muscle or back pain  PSYCHIATRIC: No depression or anxiety  HEME/LYMPH: No easy bruising or bleeding gums      MEDICATIONS  (STANDING):  amoxicillin  875 milliGRAM(s)/clavulanate 1 Tablet(s) Oral two times a day  famotidine    Tablet 20 milliGRAM(s) Oral two times a day  fluticasone propionate 50 MICROgram(s)/spray Nasal Spray 1 Spray(s) Both Nostrils two times a day  nicotine -  14 mG/24Hr(s) Patch 1 Patch Transdermal daily  sodium chloride 0.65% Nasal 1 Spray(s) Both Nostrils four times a day      I&O's Summary    29 May 2025 07:01  -  30 May 2025 07:00  --------------------------------------------------------  IN: 150 mL / OUT: 0 mL / NET: 150 mL      PHYSICAL EXAM:  Vital Signs Last 24 Hrs  T(C): 37 (30 May 2025 11:42), Max: 37 (30 May 2025 11:42)  T(F): 98.6 (30 May 2025 11:42), Max: 98.6 (30 May 2025 11:42)  HR: 70 (30 May 2025 11:42) (70 - 76)  BP: 132/71 (30 May 2025 11:42) (132/71 - 151/79)  RR: 18 (30 May 2025 11:42) (18 - 18)  SpO2: 94% (30 May 2025 11:42) (93% - 94%)    Parameters below as of 30 May 2025 11:42  Patient On (Oxygen Delivery Method): room air      CONSTITUTIONAL: NAD, well-developed   EYES: PERRLA; conjunctiva and sclera clear  ENMT: Moist oral mucosa, no pharyngeal injection or exudates  NECK: Supple   RESPIRATORY: Normal respiratory effort; lungs are clear to auscultation bilaterally  CARDIOVASCULAR: Regular rate and rhythm, normal S1 and S2   ABDOMEN: Nontender to palpation, normoactive bowel sounds   MUSCULOSKELETAL: no clubbing or cyanosis of digits; no joint swelling or tenderness to palpation  PSYCH: affect appropriate  NEUROLOGY: no gross sensory deficits   SKIN: No rashes     LABS:                        13.0   8.58  )-----------( 264      ( 30 May 2025 06:58 )             39.3     05-30    136  |  99  |  26[H]  ----------------------------<  116[H]  3.8   |  23  |  0.76    Ca    9.4      30 May 2025 06:58  Phos  3.1     05-30  Mg     2.5     05-30      Urinalysis Basic - ( 30 May 2025 06:58 )    Color: x / Appearance: x / SG: x / pH: x  Gluc: 116 mg/dL / Ketone: x  / Bili: x / Urobili: x   Blood: x / Protein: x / Nitrite: x   Leuk Esterase: x / RBC: x / WBC x   Sq Epi: x / Non Sq Epi: x / Bacteria: x      Urinalysis with Rflx Culture (collected 28 May 2025 00:27)    Culture - Blood (collected 27 May 2025 22:11)  Source: Blood Blood-Peripheral  Gram Stain (29 May 2025 20:33):    Growth in aerobic bottle: Gram Positive Cocci in Clusters  Preliminary Report (29 May 2025 20:33):    Growth in aerobic bottle: Gram Positive Cocci in Clusters    Direct identification is available within approximately 3-5    hours either by Blood Panel Multiplexed PCR or Direct    MALDI-TOF. Details: [email or web address]  Organism: Blood Culture PCR (29 May 2025 22:07)  Organism: Blood Culture PCR (29 May 2025 22:07)    Culture - Blood (collected 27 May 2025 21:25)  Source: Blood Blood-Peripheral  Preliminary Report (30 May 2025 01:02):    No growth at 48 Hours      RADIOLOGY & ADDITIONAL TESTS:  New Imaging Personally Reviewed Today:  New Electrocardiogram Personally Reviewed Today:  Other Results Reviewed Today:   Prior or Outpatient Records Reviewed Today with Summary:    COORDINATION OF CARE:  Consultant Communication and Details of Discussion (where applicable):

## 2025-05-30 NOTE — PROGRESS NOTE ADULT - SUBJECTIVE AND OBJECTIVE BOX
ENT ISSUE: nonobstructive supraglottitis    HPI: 81 y/o, F, with history of seasonal allergies, presents to ED for 2 day history severe throat pain and difficulty swallowing secretion. ENT consulted for airway evaluation. Per patient, she had similar symptoms 2 years ago, but not as worse. Pt c/o gurgling sound in her throat. On initial laryngoscopy found to have moderate to severe bilateral arytenoid edema, pooling of secretions but has been comfortable on room air. Repeat laryngoscopy has been showing improvement in edema and secretions. Pt has been on unasyn and decadron 10mg q8 hrs x 48 hrs. Pt states her breathing and throat pain are improved. Pt now on regular diet with thin liquids after MBS. Denies fever, N/V, dysphagia, odynophagia, dysphonia, SOB, dyspnea, changes in voice or inability to tolerate secretions.           PAST MEDICAL & SURGICAL HISTORY:  No pertinent past medical history      No significant past surgical history        Allergies    No Known Allergies    Intolerances      MEDICATIONS  (STANDING):  ampicillin/sulbactam  IVPB 3 Gram(s) IV Intermittent every 6 hours  ampicillin/sulbactam  IVPB      cefTRIAXone   IVPB 1000 milliGRAM(s) IV Intermittent every 24 hours  diphenhydrAMINE Injectable 25 milliGRAM(s) IV Push every 12 hours  famotidine Injectable 40 milliGRAM(s) IV Push every 12 hours  fluticasone propionate 50 MICROgram(s)/spray Nasal Spray 1 Spray(s) Both Nostrils two times a day  nicotine -  14 mG/24Hr(s) Patch 1 Patch Transdermal daily  sodium chloride 0.65% Nasal 1 Spray(s) Both Nostrils four times a day    MEDICATIONS  (PRN):        Social History: see consult    Family history: see consult      ROS:   ENT: all negative except as noted in HPI   Pulm: denies SOB, cough, hemoptysis  Neuro: denies numbness/tingling, loss of sensation  Endo: denies heat/cold intolerance, excessive sweating      Vital Signs Last 24 Hrs  T(C): 36.8 (30 May 2025 04:45), Max: 36.8 (30 May 2025 04:45)  T(F): 98.3 (30 May 2025 04:45), Max: 98.3 (30 May 2025 04:45)  HR: 70 (30 May 2025 04:45) (70 - 76)  BP: 145/74 (30 May 2025 04:45) (145/74 - 151/79)  BP(mean): --  RR: 18 (30 May 2025 04:45) (18 - 18)  SpO2: 93% (30 May 2025 04:45) (93% - 94%)    Parameters below as of 30 May 2025 04:45  Patient On (Oxygen Delivery Method): room air                              13.0   8.58  )-----------( 264      ( 30 May 2025 06:58 )             39.3    05-30    136  |  99  |  26[H]  ----------------------------<  116[H]  3.8   |  23  |  0.76    Ca    9.4      30 May 2025 06:58  Phos  3.1     05-30  Mg     2.5     05-30           PHYSICAL EXAM:  Gen: NAD  Skin: No rashes, bruises, or lesions  Head: Normocephalic, Atraumatic  Face: no edema, erythema, or fluctuance. Parotid glands soft without mass  Eyes: no scleral injection  Nose: Nares bilaterally patent, no discharge  Mouth: No Stridor / Drooling / Trismus.  Mucosa moist, tongue/uvula midline, oropharynx clear  Neck: Flat, supple, no lymphadenopathy, trachea midline, no masses.   Lymphatic: No lymphadenopathy  Resp: breathing easily, no stridor  Neuro: no facial droop

## 2025-05-30 NOTE — DISCHARGE NOTE PROVIDER - HOSPITAL COURSE
HPI:  80F w/Hx of vertigo, seasonal allergies and 1ppd smoking presents due to over 1 week of cough and sore throat that progressed the last few days into difficulty swallowing. Patient had similar situation in 2023 that resolved. Patient reports she lives with 2 cats. Denies fever, sick contacts. Reports she takes allegra daily because she gets red splotchy rashes if she does not take it. She had a formal allergy test years ago that was only positive for cockroaches at that time, and she has not had a reassessment since. She denies eating any out of ordinary foods. Unsure of any possible triggers. Patient also reports bouts of vertigo about once a month that resolve on their own or with medication. Patient denies chest pain, SOB, headache, dizziness, abd pain, nausea, vomiting, constipation, dysuria. (28 May 2025 05:54)    Hospital Course:  ENT consulted for throat pain and airway eval, bedside flex laryngoscopy showed  moderate to severe bilateral nasal congestion, clear nasal streaking noted in posterior nasopharynx, c/w allergic rhinitis. +pooling of oral secretion around esophageal inlet and b/l piriform sinuses. +moderate to severe bilateral arytenoid edema/swelling. no active aspiration. MICU consulted, not a candidate. CT Neck Soft Tissue showed Supraglottitis (Similar findings were seen on prior exam from 1/29/2023) as well as mild nonspecific thickening of the proximal esophageal walls. Patient treated with empiric IV Ceftriaxone and Unasyn, IV famotidine, and IV diphenhydramine, 48 hours of IV dexamethasone. SLP consulted, MBS performed with no s/s of aspiration, approved for regular diet consistency. Repeat flex laryngoscopies by ENT showed improvement in supraglottic edema. Steroids completed, abx transitioned to oral augmentin to complete course after discharge. PT evaluated patient and recommended home PT. Discharge plan with medication reconciliation discussed with attending  __________________. Patient is medically cleared for discharge home with home care    Important/Active Issues for Follow-Up:  Supraglottic edema- ENT, Primary Care    Medication Changes and Reason:  oral augmentin added for supraglottic edema    Advance Directives:  [x] Full code [ ] Hospice [ ] DNR    Discharge Diagnoses:  supraglottic edema   HPI:  80F w/Hx of vertigo, seasonal allergies and 1ppd smoking presents due to over 1 week of cough and sore throat that progressed the last few days into difficulty swallowing. Patient had similar situation in 2023 that resolved. Patient reports she lives with 2 cats. Denies fever, sick contacts. Reports she takes allegra daily because she gets red splotchy rashes if she does not take it. She had a formal allergy test years ago that was only positive for cockroaches at that time, and she has not had a reassessment since. She denies eating any out of ordinary foods. Unsure of any possible triggers. Patient also reports bouts of vertigo about once a month that resolve on their own or with medication. Patient denies chest pain, SOB, headache, dizziness, abd pain, nausea, vomiting, constipation, dysuria. (28 May 2025 05:54)    Hospital Course:  ENT consulted for throat pain and airway eval, bedside flex laryngoscopy showed moderate to severe bilateral nasal congestion, clear nasal streaking noted in posterior nasopharynx, c/f allergic rhinitis. +pooling of oral secretion around esophageal inlet and b/l piriform sinuses. +moderate to severe bilateral arytenoid edema/swelling. no active aspiration. MICU consulted, not a candidate. CT Neck Soft Tissue showed Supraglottitis (Similar findings were seen on prior exam from 1/29/2023) as well as mild nonspecific thickening of the proximal esophageal walls. Patient treated with empiric IV Ceftriaxone and Unasyn, IV famotidine, IV diphenhydramine, 48 hours of IV dexamethasone. SLP consulted, MBS performed with no s/s of aspiration, approved for regular diet consistency. Repeat flex laryngoscopies by ENT showed improvement in supraglottic edema. Steroids completed, abx transitioned to oral augmentin to complete course after discharge. PT evaluated patient and recommended home PT. Discharge plan with medication reconciliation discussed with attending  __________________. Patient is medically cleared for discharge home with home care    Important/Active Issues for Follow-Up:  Supraglottic edema- ENT, Primary Care    Medication Changes and Reason:  oral augmentin added for supraglottic edema    Advance Directives:  [x] Full code [ ] Hospice [ ] DNR    Discharge Diagnoses:  supraglottic edema   HPI:  80F w/Hx of vertigo, seasonal allergies and 1ppd smoking presents due to over 1 week of cough and sore throat that progressed the last few days into difficulty swallowing. Patient had similar situation in 2023 that resolved. Patient reports she lives with 2 cats. Denies fever, sick contacts. Reports she takes allegra daily because she gets red splotchy rashes if she does not take it. She had a formal allergy test years ago that was only positive for cockroaches at that time, and she has not had a reassessment since. She denies eating any out of ordinary foods. Unsure of any possible triggers. Patient also reports bouts of vertigo about once a month that resolve on their own or with medication. Patient denies chest pain, SOB, headache, dizziness, abd pain, nausea, vomiting, constipation, dysuria. (28 May 2025 05:54)    Hospital Course:  ENT consulted for throat pain and airway eval, bedside flex laryngoscopy showed moderate to severe bilateral nasal congestion, clear nasal streaking noted in posterior nasopharynx, c/f allergic rhinitis. +pooling of oral secretion around esophageal inlet and b/l piriform sinuses. +moderate to severe bilateral arytenoid edema/swelling. no active aspiration. MICU consulted, not a candidate. CT Neck Soft Tissue showed Supraglottitis (Similar findings were seen on prior exam from 1/29/2023) as well as mild nonspecific thickening of the proximal esophageal walls. Patient treated with empiric IV Ceftriaxone and Unasyn, IV famotidine, IV diphenhydramine, 48 hours of IV dexamethasone. SLP consulted, MBS performed with no s/s of aspiration, approved for regular diet consistency. Repeat flex laryngoscopies by ENT showed improvement in supraglottic edema. Steroids completed, abx transitioned to oral augmentin to complete course after discharge. PT evaluated patient and recommended home PT. Discharge plan with medication reconciliation discussed with attending . Patient is medically cleared for discharge home with home care.    Important/Active Issues for Follow-Up:  Supraglottic edema- ENT, Primary Care    Medication Changes and Reason:  oral augmentin added for supraglottic edema    Advance Directives:  [x] Full code [ ] Hospice [ ] DNR    Discharge Diagnoses:  supraglottic edema   HPI:  80F w/Hx of vertigo, seasonal allergies and 1ppd smoking presents due to over 1 week of cough and sore throat that progressed the last few days into difficulty swallowing. Patient had similar situation in 2023 that resolved. Patient reports she lives with 2 cats. Denies fever, sick contacts. Reports she takes allegra daily because she gets red splotchy rashes if she does not take it. She had a formal allergy test years ago that was only positive for cockroaches at that time, and she has not had a reassessment since. She denies eating any out of ordinary foods. Unsure of any possible triggers. Patient also reports bouts of vertigo about once a month that resolve on their own or with medication. Patient denies chest pain, SOB, headache, dizziness, abd pain, nausea, vomiting, constipation, dysuria. (28 May 2025 05:54)    Hospital Course:  ENT consulted for throat pain and airway eval, bedside flex laryngoscopy showed moderate to severe bilateral nasal congestion, clear nasal streaking noted in posterior nasopharynx, c/f allergic rhinitis. +pooling of oral secretion around esophageal inlet and b/l piriform sinuses. +moderate to severe bilateral arytenoid edema/swelling. no active aspiration. MICU consulted, not a candidate. CT Neck Soft Tissue showed Supraglottitis (Similar findings were seen on prior exam from 1/29/2023) as well as mild nonspecific thickening of the proximal esophageal walls. Patient treated with empiric IV Ceftriaxone and Unasyn, IV famotidine, IV diphenhydramine, 48 hours of IV dexamethasone. SLP consulted, MBS performed with no s/s of aspiration, approved for regular diet consistency. Repeat flex laryngoscopies by ENT showed improvement in supraglottic edema. Steroids completed, abx transitioned to oral augmentin to complete course after discharge. PT evaluated patient and recommended home PT. Discharge plan with medication reconciliation discussed with attending . Patient is medically cleared for discharge home with home care.    Important/Active Issues for Follow-Up:  Supraglottic edema- ENT, Primary Care    Medication Changes and Reason:  oral augmentin added for supraglottic edema for 6 more days       Advance Directives:  [x] Full code [ ] Hospice [ ] DNR    Discharge Diagnoses:  supraglottic edema

## 2025-05-30 NOTE — PROGRESS NOTE ADULT - PROBLEM SELECTOR PLAN 1
Seen on CT  - ENT following, recs appreciated   - MBS done; ok for regular diet per S/S eval   - Low threshold for MICU consult or intubation if pt develop shortness of breath or stridor   - completed 48 hours course of Decadron 10mg IV Q8H; can hold off on further steroids at this time     - change Pepcid from IV to PO   - will discontinue Benadryl 25mg IV BID at this time  - flu/covid/rsv neg  - one set of blood cx with CONS; likely contaminant; will send repeat blood cx stat to ensure no bacteremia  - ENT recs noted; change abx to augmentin BID for 7 days

## 2025-05-30 NOTE — DISCHARGE NOTE PROVIDER - NSDCCPCAREPLAN_GEN_ALL_CORE_FT
PRINCIPAL DISCHARGE DIAGNOSIS  Diagnosis: Supraglottic edema  Assessment and Plan of Treatment: You were treated with IV antibiotics in the hospital (Ceftriaxone, Unasyn). You are being discharged on oral antibiotics (Augmentin). Take Augmentin 2 times a day as prescribed, last dose in the morning of 06/06/25. Follow up at [facility] ENT clinic call [phone] to make an appointment. [address] [place] [address], [place], [address]. Follow up with your Primary Care Provider for allergy testing.  SEEK MEDICAL CARE IF:  You have wheezing or shortness of breath  You have thickening of sputum.   Your sputum changes from clear or white to yellow, green, gray, or bloody.   SEEK IMMEDIATE MEDICAL CARE IF:  You are short of breath even at rest.  You get short of breath when doing very little physical activity.  You have difficulty eating, drinking, or talking due to throat pain or difficulty swallowing  You have chest pain or you feel that your heart is beating fast.   You have a bluish color to your lips or fingernails.  You are lightheaded, dizzy, or faint.  You have a fever or persistent symptoms for more than 2–3 days.   You have a fever and symptoms suddenly get worse.     PRINCIPAL DISCHARGE DIAGNOSIS  Diagnosis: Supraglottic edema  Assessment and Plan of Treatment: You were treated with IV antibiotics in the hospital (Ceftriaxone, Unasyn). You are being discharged on oral antibiotics (Augmentin). Take Augmentin 2 times a day as prescribed, last dose in the morning of 06/06/25. Follow up at [facility] ENT clinic call [phone] to make an appointment. [address] [place] [address], [place], [address]. Follow up with your Primary Care Provider for allergy testing.  SEEK MEDICAL CARE IF:  You have wheezing or shortness of breath  You have thickening of sputum.   Your sputum changes from clear or white to yellow, green, gray, or bloody.   SEEK IMMEDIATE MEDICAL CARE IF:  You are short of breath even at rest.  You get short of breath when doing very little physical activity.  You have difficulty eating, drinking, or talking due to throat pain or difficulty swallowing  You have chest pain or you feel that your heart is beating fast.   You have a bluish color to your lips or fingernails.  You are lightheaded, dizzy, or faint.  You have a fever or persistent symptoms for more than 2–3 days.   You have a fever and symptoms suddenly get worse.      SECONDARY DISCHARGE DIAGNOSES  Diagnosis: Dysphagia  Assessment and Plan of Treatment: MBS done; ok for regular diet per S/S eval   - Aspiration precautions.      Diagnosis: Seasonal allergic rhinitis  Assessment and Plan of Treatment: Seasonal allergic rhinitis.  Recommended outpatient follow up for allergy testing  - Flonase nasal spray 1 spray into each nostril BID  - Nasal saline 1 spray into each nostril TID.

## 2025-05-30 NOTE — DISCHARGE NOTE PROVIDER - NSDCFUADDAPPT_GEN_ALL_CORE_FT
APPTS ARE READY TO BE MADE: [X] YES    Best Family or Patient Contact (if needed):    Additional Information about above appointments (if needed):    1: ENT: Follow up at [facility] ENT clinic call [phone] to make an appointment. [address], [place], [address].  2: PCP 1-2 weeks   3:     Other comments or requests:    APPTS ARE READY TO BE MADE: [X] YES    Best Family or Patient Contact (if needed):    Additional Information about above appointments (if needed):    1: ENT: Follow up at [facility] ENT clinic call [phone] to make an appointment. [address], [place], [address].  2: PCP 1-2 weeks     Other comments or requests:    APPTS ARE READY TO BE MADE: [X] YES    Best Family or Patient Contact (if needed):    Additional Information about above appointments (if needed):    1: ENT: Follow up at [facility] ENT clinic call [phone] to make an appointment. [address] [facility], [address].  2: PCP 1-2 weeks     Other comments or requests:     INTMED - Patient was outreached but did not answer. A voicemail was left for the patient to return our call.    ENT - Prior to outreaching the patient, it was visible that the patient has secured a follow up appointment which was not scheduled by our team. Patient is scheduled with Dr. Paz 8/7 10:45am [address] [facility]

## 2025-05-30 NOTE — DISCHARGE NOTE PROVIDER - CARE PROVIDERS DIRECT ADDRESSES
[email or web address]

## 2025-05-30 NOTE — DISCHARGE NOTE PROVIDER - NSDCFUSCHEDAPPT_GEN_ALL_CORE_FT
Jordon Paz  [facility]  OTOLARYNG [address] [facility]  Scheduled Appointment: 08/07/2025

## 2025-05-30 NOTE — PROGRESS NOTE ADULT - ASSESSMENT
81 y/o, F, with history of seasonal allergies, presents to ED for 2 day history severe throat pain and difficulty swallowing secretion. ENT consulted for airway evaluation. Per patient, she had similar symptoms 2 years ago, but not as worse. Pt c/o gurgling sound in her throat. On initial laryngoscopy found to have moderate to severe bilateral arytenoid edema, pooling of secretions but has been comfortable on room air. Repeat laryngoscopy has been showing improvement in edema and secretions. Pt has been on unasyn and decadron 10mg q8 hrs x 48 hrs. Pt states her breathing and throat pain are improved. Pt now on regular diet with thin liquids after MBS. Pt comfortable and O2 sat 98% on room air.

## 2025-05-30 NOTE — PROGRESS NOTE ADULT - PROBLEM SELECTOR PLAN 1
improved  d/c home with po augmentin  Follow up at [facility] ENT clinic call [phone] to make an appointment. [address], [place], [address]. improved  d/c home with po augmentin x 7 days  Follow up at [facility] ENT clinic call [phone] to make an appointment. [address], [place], [address].

## 2025-05-30 NOTE — DISCHARGE NOTE PROVIDER - CARE PROVIDER_API CALL
Sera Bateman  Internal Medicine  [address] [place], [place]   Phone: [phone]  Fax: [phone]  Established Patient  Follow Up Time: 1 week   Sera Bateman  Internal Medicine  [address] [place], [place]   Phone: [phone]  Fax: [phone]  Established Patient  Follow Up Time: 1 week    [facility] ENT clinic,   [address] [place] [address], [place], [facility].  Phone: [phone]  Fax: (   )    -  Follow Up Time: 1 week

## 2025-05-30 NOTE — DISCHARGE NOTE PROVIDER - NSDCMRMEDTOKEN_GEN_ALL_CORE_FT
Allegra 24 Hour Allergy oral tablet: 1 tab(s) orally once a day   Allegra 24 Hour Allergy oral tablet: 1 tab(s) orally once a day  nicotine 14 mg/24 hr transdermal film, extended release: 1 patch transdermal once a day   Allegra 24 Hour Allergy oral tablet: 1 tab(s) orally once a day  nicotine 14 mg/24 hr transdermal film, extended release: 1 patch transdermal once a day  Rolling walker: for ambulation;   to help complete mobility related activities for daily living.   Allegra 24 Hour Allergy oral tablet: 1 tab(s) orally once a day  amoxicillin-clavulanate 875 mg-125 mg oral tablet: 1 tab(s) orally 2 times a day  famotidine 20 mg oral tablet: 1 tab(s) orally 2 times a day  nicotine 14 mg/24 hr transdermal film, extended release: 1 patch transdermal once a day  Rolling walker: for ambulation;   to help complete mobility related activities for daily living.

## 2025-05-31 PROCEDURE — 99232 SBSQ HOSP IP/OBS MODERATE 35: CPT

## 2025-05-31 RX ORDER — FLUTICASONE PROPIONATE 50 UG/1
1 SPRAY, METERED NASAL
Qty: 1 | Refills: 0
Start: 2025-05-31 | End: 2025-06-03

## 2025-05-31 RX ORDER — AMOXICILLIN AND CLAVULANATE POTASSIUM 500; 125 MG/1; MG/1
1 TABLET, FILM COATED ORAL
Qty: 13 | Refills: 0
Start: 2025-05-31 | End: 2025-06-05

## 2025-05-31 RX ORDER — FLUTICASONE PROPIONATE 50 UG/1
1 SPRAY, METERED NASAL
Qty: 0 | Refills: 0 | DISCHARGE
Start: 2025-05-31

## 2025-05-31 RX ORDER — SODIUM CHLORIDE 0.65 %
1 AEROSOL, SPRAY (ML) NASAL
Qty: 0 | Refills: 0 | DISCHARGE
Start: 2025-05-31

## 2025-05-31 RX ORDER — NICOTINE POLACRILEX 4 MG/1
1 GUM, CHEWING ORAL
Qty: 0 | Refills: 0 | DISCHARGE
Start: 2025-05-31

## 2025-05-31 RX ORDER — SODIUM CHLORIDE 0.65 %
1 AEROSOL, SPRAY (ML) NASAL
Qty: 1 | Refills: 0
Start: 2025-05-31 | End: 2025-06-10

## 2025-05-31 RX ADMIN — Medication 20 MILLIGRAM(S): at 06:10

## 2025-05-31 RX ADMIN — NICOTINE POLACRILEX 1 PATCH: 4 GUM, CHEWING ORAL at 19:00

## 2025-05-31 RX ADMIN — Medication 1 SPRAY(S): at 00:56

## 2025-05-31 RX ADMIN — Medication 20 MILLIGRAM(S): at 17:38

## 2025-05-31 RX ADMIN — Medication 1 SPRAY(S): at 06:11

## 2025-05-31 RX ADMIN — NICOTINE POLACRILEX 1 PATCH: 4 GUM, CHEWING ORAL at 13:26

## 2025-05-31 RX ADMIN — NICOTINE POLACRILEX 1 PATCH: 4 GUM, CHEWING ORAL at 08:16

## 2025-05-31 RX ADMIN — AMOXICILLIN AND CLAVULANATE POTASSIUM 1 TABLET(S): 500; 125 TABLET, FILM COATED ORAL at 06:10

## 2025-05-31 RX ADMIN — AMOXICILLIN AND CLAVULANATE POTASSIUM 1 TABLET(S): 500; 125 TABLET, FILM COATED ORAL at 17:38

## 2025-05-31 RX ADMIN — NICOTINE POLACRILEX 1 PATCH: 4 GUM, CHEWING ORAL at 12:09

## 2025-05-31 RX ADMIN — FLUTICASONE PROPIONATE 1 SPRAY(S): 50 SPRAY, METERED NASAL at 06:10

## 2025-05-31 RX ADMIN — Medication 1 SPRAY(S): at 12:09

## 2025-05-31 NOTE — PROGRESS NOTE ADULT - SUBJECTIVE AND OBJECTIVE BOX
Patient is a 80y old  Female who presents with a chief complaint of Supraglottitis (30 May 2025 14:53)      SUBJECTIVE / OVERNIGHT EVENTS:  Pt seen and examined. No acute events overnight. Limited ROS on account of somnolence. Pt appears comfortable.    MEDICATIONS  (STANDING):  amoxicillin  875 milliGRAM(s)/clavulanate 1 Tablet(s) Oral two times a day  famotidine    Tablet 20 milliGRAM(s) Oral two times a day  fluticasone propionate 50 MICROgram(s)/spray Nasal Spray 1 Spray(s) Both Nostrils two times a day  nicotine -  14 mG/24Hr(s) Patch 1 Patch Transdermal daily  sodium chloride 0.65% Nasal 1 Spray(s) Both Nostrils four times a day    MEDICATIONS  (PRN):      Vital Signs Last 24 Hrs  T(C): 36.8 (31 May 2025 12:35), Max: 36.8 (30 May 2025 20:07)  T(F): 98.2 (31 May 2025 12:35), Max: 98.3 (30 May 2025 20:07)  HR: 73 (31 May 2025 12:35) (65 - 77)  BP: 117/70 (31 May 2025 12:35) (100/60 - 133/73)  BP(mean): --  RR: 18 (31 May 2025 12:35) (18 - 18)  SpO2: 94% (31 May 2025 12:35) (94% - 95%)    Parameters below as of 31 May 2025 12:35  Patient On (Oxygen Delivery Method): room air      CAPILLARY BLOOD GLUCOSE        I&O's Summary    30 May 2025 07:01  -  31 May 2025 07:00  --------------------------------------------------------  IN: 370 mL / OUT: 0 mL / NET: 370 mL        PHYSICAL EXAM:  GENERAL: NAD, well-groomed  HEAD:  Atraumatic, Normocephalic  EYES: EOMI, PERRLA, conjunctiva and sclera clear  NECK: Supple, No JVD  CHEST/LUNG: Clear to auscultation bilaterally; No wheeze  HEART: Regular rate and rhythm; No murmurs, rubs, or gallops  ABDOMEN: Soft, Nontender, Nondistended; Bowel sounds present  EXTREMITIES:  2+ Peripheral Pulses, No clubbing, cyanosis, or edema  PSYCH: somnolent but arousable  NEUROLOGY: non-focal  SKIN: No rashes or lesions    LABS:                        13.0   8.58  )-----------( 264      ( 30 May 2025 06:58 )             39.3     05-30    136  |  99  |  26[H]  ----------------------------<  116[H]  3.8   |  23  |  0.76    Ca    9.4      30 May 2025 06:58  Phos  3.1     05-30  Mg     2.5     05-30            Urinalysis Basic - ( 30 May 2025 06:58 )    Color: x / Appearance: x / SG: x / pH: x  Gluc: 116 mg/dL / Ketone: x  / Bili: x / Urobili: x   Blood: x / Protein: x / Nitrite: x   Leuk Esterase: x / RBC: x / WBC x   Sq Epi: x / Non Sq Epi: x / Bacteria: x

## 2025-05-31 NOTE — PROGRESS NOTE ADULT - NSPROGADDITIONALINFOA_GEN_ALL_CORE
Discussed with ACP, Thee
time spent reviewing prior charts, meds, discussing plan     d/w ACP Jordan
Discussed with ACP, Marianela
Discussed with ACP, Thee

## 2025-05-31 NOTE — PROGRESS NOTE ADULT - PROBLEM SELECTOR PLAN 1
Seen on CT  - ENT following, recs appreciated   - MBS done; ok for regular diet per S/S eval   - Low threshold for MICU consult or intubation if pt develop shortness of breath or stridor   - completed 48 hours course of Decadron 10mg IV Q8H; can hold off on further steroids at this time     - c/w pepcid  - s/p Benadryl 25mg IV BID   - flu/covid/rsv neg  - one set of blood cx with CONS; likely contaminant; f/up repeat blood cx  to ensure no bacteremia  - ENT recs noted; switched to augmentin BID for 7 days

## 2025-05-31 NOTE — CHART NOTE - NSCHARTNOTEFT_GEN_A_CORE
Patient will require a rolling walker at home due to their diagnosis of impaired functional mobility to help complete MRALDs.    Jordan Nugent NP  Dept. of medicine

## 2025-06-01 VITALS
HEART RATE: 65 BPM | TEMPERATURE: 97 F | DIASTOLIC BLOOD PRESSURE: 62 MMHG | RESPIRATION RATE: 18 BRPM | OXYGEN SATURATION: 96 % | SYSTOLIC BLOOD PRESSURE: 119 MMHG

## 2025-06-01 PROCEDURE — 80053 COMPREHEN METABOLIC PANEL: CPT

## 2025-06-01 PROCEDURE — 80061 LIPID PANEL: CPT

## 2025-06-01 PROCEDURE — 85027 COMPLETE CBC AUTOMATED: CPT

## 2025-06-01 PROCEDURE — 84100 ASSAY OF PHOSPHORUS: CPT

## 2025-06-01 PROCEDURE — 85730 THROMBOPLASTIN TIME PARTIAL: CPT

## 2025-06-01 PROCEDURE — 96374 THER/PROPH/DIAG INJ IV PUSH: CPT

## 2025-06-01 PROCEDURE — 92610 EVALUATE SWALLOWING FUNCTION: CPT

## 2025-06-01 PROCEDURE — 83036 HEMOGLOBIN GLYCOSYLATED A1C: CPT

## 2025-06-01 PROCEDURE — 87150 DNA/RNA AMPLIFIED PROBE: CPT

## 2025-06-01 PROCEDURE — 87040 BLOOD CULTURE FOR BACTERIA: CPT

## 2025-06-01 PROCEDURE — 83735 ASSAY OF MAGNESIUM: CPT

## 2025-06-01 PROCEDURE — 97161 PT EVAL LOW COMPLEX 20 MIN: CPT

## 2025-06-01 PROCEDURE — 87077 CULTURE AEROBIC IDENTIFY: CPT

## 2025-06-01 PROCEDURE — 36415 COLL VENOUS BLD VENIPUNCTURE: CPT

## 2025-06-01 PROCEDURE — 85610 PROTHROMBIN TIME: CPT

## 2025-06-01 PROCEDURE — 99285 EMERGENCY DEPT VISIT HI MDM: CPT

## 2025-06-01 PROCEDURE — 83605 ASSAY OF LACTIC ACID: CPT

## 2025-06-01 PROCEDURE — 99239 HOSP IP/OBS DSCHRG MGMT >30: CPT

## 2025-06-01 PROCEDURE — 80048 BASIC METABOLIC PNL TOTAL CA: CPT

## 2025-06-01 PROCEDURE — 71046 X-RAY EXAM CHEST 2 VIEWS: CPT

## 2025-06-01 PROCEDURE — 87637 SARSCOV2&INF A&B&RSV AMP PRB: CPT

## 2025-06-01 PROCEDURE — 81003 URINALYSIS AUTO W/O SCOPE: CPT

## 2025-06-01 PROCEDURE — 70491 CT SOFT TISSUE NECK W/DYE: CPT

## 2025-06-01 PROCEDURE — 94640 AIRWAY INHALATION TREATMENT: CPT

## 2025-06-01 PROCEDURE — 92611 MOTION FLUOROSCOPY/SWALLOW: CPT

## 2025-06-01 PROCEDURE — 82962 GLUCOSE BLOOD TEST: CPT

## 2025-06-01 PROCEDURE — 74230 X-RAY XM SWLNG FUNCJ C+: CPT

## 2025-06-01 PROCEDURE — 85025 COMPLETE CBC W/AUTO DIFF WBC: CPT

## 2025-06-01 PROCEDURE — 93005 ELECTROCARDIOGRAM TRACING: CPT

## 2025-06-01 RX ORDER — AMOXICILLIN AND CLAVULANATE POTASSIUM 500; 125 MG/1; MG/1
1 TABLET, FILM COATED ORAL
Qty: 12 | Refills: 0
Start: 2025-06-01 | End: 2025-06-06

## 2025-06-01 RX ADMIN — FLUTICASONE PROPIONATE 1 SPRAY(S): 50 SPRAY, METERED NASAL at 05:12

## 2025-06-01 RX ADMIN — Medication 1 SPRAY(S): at 05:12

## 2025-06-01 RX ADMIN — NICOTINE POLACRILEX 1 PATCH: 4 GUM, CHEWING ORAL at 07:22

## 2025-06-01 RX ADMIN — Medication 1 SPRAY(S): at 00:08

## 2025-06-01 RX ADMIN — Medication 20 MILLIGRAM(S): at 05:12

## 2025-06-01 RX ADMIN — AMOXICILLIN AND CLAVULANATE POTASSIUM 1 TABLET(S): 500; 125 TABLET, FILM COATED ORAL at 05:12

## 2025-06-01 NOTE — PROGRESS NOTE ADULT - PROBLEM SELECTOR PLAN 4
1ppd smoker  - Advised patient to stop smoking  - Pt desired cessation aid  - Nicotine patch

## 2025-06-01 NOTE — DISCHARGE NOTE NURSING/CASE MANAGEMENT/SOCIAL WORK - FINANCIAL ASSISTANCE
[facility] provides services at a reduced cost to those who are determined to be eligible through [facility]’s financial assistance program. Information regarding [facility]’s financial assistance program can be found by going to [email or web address] or by calling [phone].

## 2025-06-01 NOTE — PROGRESS NOTE ADULT - PROBLEM/PLAN-2
DISPLAY PLAN FREE TEXT
denies pain/discomfort

## 2025-06-01 NOTE — DISCHARGE NOTE NURSING/CASE MANAGEMENT/SOCIAL WORK - NSDCPEFALRISK_GEN_ALL_CORE
For information on Fall & Injury Prevention, visit: [email or web address] OR  [email or web address] [place]  https://www.cdc.gov/steadi/patient.html

## 2025-06-01 NOTE — DISCHARGE NOTE NURSING/CASE MANAGEMENT/SOCIAL WORK - NSDCPEEMAIL_GEN_ALL_CORE
[facility] for Tobacco Control email [email or web address]

## 2025-06-01 NOTE — PROGRESS NOTE ADULT - PROBLEM SELECTOR PROBLEM 3
Seasonal allergic rhinitis

## 2025-06-01 NOTE — PROGRESS NOTE ADULT - SUBJECTIVE AND OBJECTIVE BOX
Patient is a 80y old  Female who presents with a chief complaint of Supraglottitis (31 May 2025 13:40)      SUBJECTIVE / OVERNIGHT EVENTS:  Pt seen and examined. No acute events overnight. She denies cp, sob. dysphagia.    MEDICATIONS  (STANDING):  amoxicillin  875 milliGRAM(s)/clavulanate 1 Tablet(s) Oral two times a day  famotidine    Tablet 20 milliGRAM(s) Oral two times a day  fluticasone propionate 50 MICROgram(s)/spray Nasal Spray 1 Spray(s) Both Nostrils two times a day  nicotine -  14 mG/24Hr(s) Patch 1 Patch Transdermal daily  sodium chloride 0.65% Nasal 1 Spray(s) Both Nostrils four times a day    MEDICATIONS  (PRN):      Vital Signs Last 24 Hrs  T(C): 36.3 (01 Jun 2025 11:00), Max: 36.7 (31 May 2025 21:27)  T(F): 97.3 (01 Jun 2025 11:00), Max: 98 (31 May 2025 21:27)  HR: 65 (01 Jun 2025 11:00) (62 - 78)  BP: 119/62 (01 Jun 2025 11:00) (111/65 - 120/68)  BP(mean): --  RR: 18 (01 Jun 2025 11:00) (18 - 18)  SpO2: 96% (01 Jun 2025 11:00) (94% - 96%)    Parameters below as of 01 Jun 2025 11:00  Patient On (Oxygen Delivery Method): room air      CAPILLARY BLOOD GLUCOSE        I&O's Summary      PHYSICAL EXAM:  GENERAL: NAD, well-groomed  HEAD:  Atraumatic, Normocephalic  EYES:  conjunctiva and sclera clear  NECK: Supple, No JVD  CHEST/LUNG: Clear to auscultation bilaterally; No wheeze  HEART: Regular rate and rhythm; No murmurs, rubs, or gallops  ABDOMEN: Soft, Nontender, Nondistended; Bowel sounds present  EXTREMITIES:  2+ Peripheral Pulses, No clubbing, cyanosis, or edema  PSYCH: AAOx3  NEUROLOGY: non-focal  SKIN: No rashes or lesions

## 2025-06-01 NOTE — DISCHARGE NOTE NURSING/CASE MANAGEMENT/SOCIAL WORK - NSDCPEWEB_GEN_ALL_CORE
[facility] website --- [email or web address] --- [email or web address]

## 2025-06-01 NOTE — PROGRESS NOTE ADULT - TIME BILLING
case complexity and discharge planning. Pt is clinically stable for discharge home. To follow up with PCP, ENT in 1-2 weeks.    d/w RENEE Armstrong

## 2025-06-01 NOTE — PROGRESS NOTE ADULT - REASON FOR ADMISSION
Supraglottitis

## 2025-06-01 NOTE — PROGRESS NOTE ADULT - PROBLEM SELECTOR PLAN 5
- low risk; encourage ambulation   - PT: HPT
- low risk; encourage ambulation   - PT: HPT     DISPO: likely DC home if prelim results of repeat blood cx are negative
- low risk; encourage ambulation     DISPO: pending clinical improvement; ENT recs; S/S eval
- low risk; encourage ambulation   - PT: HPT     DISPO: likely DC home tomorrow if continues to remain stable and if prelim results of repeat blood cx are negative
- low risk; encourage ambulation   - PT: HPT     DISPO: pending clinical improvement; final ENT recs

## 2025-06-01 NOTE — PROGRESS NOTE ADULT - PROBLEM SELECTOR PLAN 1
Seen on CT  - ENT following, recs appreciated   - MBS done; ok for regular diet per S/S eval   - Low threshold for MICU consult or intubation if pt develop shortness of breath or stridor   - completed 48 hours course of Decadron 10mg IV Q8H; can hold off on further steroids at this time     - c/w pepcid  - s/p Benadryl 25mg IV BID   - flu/covid/rsv neg  - one set of blood cx with CONS; repeat blood cx NGTD  - ENT recs noted; switched to augmentin BID to complete 7 days

## 2025-06-01 NOTE — DISCHARGE NOTE NURSING/CASE MANAGEMENT/SOCIAL WORK - NSDCFUADDAPPT_GEN_ALL_CORE_FT
APPTS ARE READY TO BE MADE: [X] YES    Best Family or Patient Contact (if needed):    Additional Information about above appointments (if needed):    1: ENT: Follow up at [facility] clinic call [phone] to make an appointment. [address], [place], [address].  2: PCP 1-2 weeks     Other comments or requests:

## 2025-06-01 NOTE — PROGRESS NOTE ADULT - PROBLEM SELECTOR PROBLEM 1
Supraglottic edema

## 2025-06-01 NOTE — PROGRESS NOTE ADULT - PROBLEM SELECTOR PLAN 2
- MBS done; ok for regular diet per S/S eval   - Aspiration precautions
- MBS done; ok for regular diet per S/S eval   - Aspiration precautions
- NPO for now per S/S; likely need for MBS  - monitor FS q6 while NPO   - Aspiration precautions
- MBS done; ok for regular diet per S/S eval   - Aspiration precautions
- MBS done; ok for regular diet per S/S eval   - Aspiration precautions

## 2025-06-02 LAB
CULTURE RESULTS: SIGNIFICANT CHANGE UP
SPECIMEN SOURCE: SIGNIFICANT CHANGE UP

## 2025-07-29 NOTE — ED PROVIDER NOTE - PRINCIPAL DIAGNOSIS
What Type Of Note Output Would You Prefer (Optional)?: Standard Output
Hpi Title: Evaluation of Skin Lesions
How Severe Are Your Spot(S)?: mild
Have Your Spot(S) Been Treated In The Past?: has not been treated
Location: Left feet
Year Removed: 1993
Dizziness